# Patient Record
Sex: FEMALE | Race: WHITE | NOT HISPANIC OR LATINO | Employment: OTHER | URBAN - METROPOLITAN AREA
[De-identification: names, ages, dates, MRNs, and addresses within clinical notes are randomized per-mention and may not be internally consistent; named-entity substitution may affect disease eponyms.]

---

## 2017-05-24 ENCOUNTER — RECORDS - HEALTHEAST (OUTPATIENT)
Dept: LAB | Facility: CLINIC | Age: 77
End: 2017-05-24

## 2017-05-24 LAB
CHOLEST SERPL-MCNC: 159 MG/DL
FASTING STATUS PATIENT QL REPORTED: NO
HDLC SERPL-MCNC: 40 MG/DL
LDLC SERPL CALC-MCNC: 68 MG/DL
TRIGL SERPL-MCNC: 255 MG/DL

## 2018-07-25 ENCOUNTER — RECORDS - HEALTHEAST (OUTPATIENT)
Dept: LAB | Facility: CLINIC | Age: 78
End: 2018-07-25

## 2018-07-25 LAB
ANION GAP SERPL CALCULATED.3IONS-SCNC: 11 MMOL/L (ref 5–18)
BUN SERPL-MCNC: 21 MG/DL (ref 8–28)
CALCIUM SERPL-MCNC: 9.4 MG/DL (ref 8.5–10.5)
CHLORIDE BLD-SCNC: 106 MMOL/L (ref 98–107)
CHOLEST SERPL-MCNC: 144 MG/DL
CO2 SERPL-SCNC: 26 MMOL/L (ref 22–31)
CREAT SERPL-MCNC: 0.93 MG/DL (ref 0.6–1.1)
CREAT UR-MCNC: 244.2 MG/DL
FASTING STATUS PATIENT QL REPORTED: YES
GFR SERPL CREATININE-BSD FRML MDRD: 59 ML/MIN/1.73M2
GLUCOSE BLD-MCNC: 137 MG/DL (ref 70–125)
HDLC SERPL-MCNC: 36 MG/DL
LDLC SERPL CALC-MCNC: 76 MG/DL
MICROALBUMIN UR-MCNC: 7.36 MG/DL (ref 0–1.99)
MICROALBUMIN/CREAT UR: 30.1 MG/G
POTASSIUM BLD-SCNC: 4.3 MMOL/L (ref 3.5–5)
SODIUM SERPL-SCNC: 143 MMOL/L (ref 136–145)
TRIGL SERPL-MCNC: 159 MG/DL

## 2018-12-28 ENCOUNTER — RECORDS - HEALTHEAST (OUTPATIENT)
Dept: ADMINISTRATIVE | Facility: OTHER | Age: 78
End: 2018-12-28

## 2019-01-29 ENCOUNTER — HOSPITAL ENCOUNTER (OUTPATIENT)
Dept: NUCLEAR MEDICINE | Facility: HOSPITAL | Age: 79
Discharge: HOME | End: 2019-01-29

## 2019-01-29 ENCOUNTER — HOSPITAL ENCOUNTER (OUTPATIENT)
Dept: CARDIOLOGY | Facility: HOSPITAL | Age: 79
Discharge: HOME | End: 2019-01-29

## 2019-01-29 DIAGNOSIS — R06.02 SOB (SHORTNESS OF BREATH): ICD-10-CM

## 2019-01-29 LAB
CV STRESS CURRENT BP HE: NORMAL
CV STRESS CURRENT HR HE: 62
CV STRESS CURRENT HR HE: 63
CV STRESS CURRENT HR HE: 64
CV STRESS CURRENT HR HE: 66
CV STRESS CURRENT HR HE: 67
CV STRESS CURRENT HR HE: 67
CV STRESS CURRENT HR HE: 68
CV STRESS CURRENT HR HE: 70
CV STRESS CURRENT HR HE: 70
CV STRESS CURRENT HR HE: 74
CV STRESS CURRENT HR HE: 74
CV STRESS CURRENT HR HE: 76
CV STRESS CURRENT HR HE: 77
CV STRESS CURRENT HR HE: 80
CV STRESS CURRENT HR HE: 84
CV STRESS CURRENT HR HE: 87
CV STRESS DEVIATION TIME HE: NORMAL
CV STRESS ECHO PERCENT HR HE: NORMAL
CV STRESS EXERCISE STAGE HE: NORMAL
CV STRESS FINAL RESTING BP HE: NORMAL
CV STRESS FINAL RESTING HR HE: 67
CV STRESS MAX HR HE: 87
CV STRESS MAX TREADMILL GRADE HE: 0
CV STRESS MAX TREADMILL SPEED HE: 0
CV STRESS PEAK DIA BP HE: NORMAL
CV STRESS PEAK SYS BP HE: NORMAL
CV STRESS PHASE HE: NORMAL
CV STRESS PROTOCOL HE: NORMAL
CV STRESS RESTING PT POSITION HE: NORMAL
CV STRESS ST DEVIATION AMOUNT HE: NORMAL
CV STRESS ST DEVIATION ELEVATION HE: NORMAL
CV STRESS ST EVELATION AMOUNT HE: NORMAL
CV STRESS TEST TYPE HE: NORMAL
CV STRESS TOTAL STAGE TIME MIN 1 HE: NORMAL
NUC STRESS EJECTION FRACTION: 69 %
STRESS ECHO BASELINE BP: NORMAL
STRESS ECHO BASELINE HR: 62
STRESS ECHO CALCULATED PERCENT HR: 60 %
STRESS ECHO LAST STRESS BP: NORMAL
STRESS ECHO LAST STRESS HR: 74

## 2019-01-29 RX ORDER — AMLODIPINE BESYLATE 5 MG/1
5 TABLET ORAL DAILY
Status: SHIPPED | COMMUNITY
Start: 2019-01-29 | End: 2022-06-14 | Stop reason: DRUGHIGH

## 2019-01-29 RX ORDER — ATORVASTATIN CALCIUM 20 MG/1
20 TABLET, FILM COATED ORAL AT BEDTIME
Status: SHIPPED | COMMUNITY
Start: 2019-01-29

## 2019-01-29 ASSESSMENT — MIFFLIN-ST. JEOR: SCORE: 1320.03

## 2019-02-19 ENCOUNTER — RECORDS - HEALTHEAST (OUTPATIENT)
Dept: LAB | Facility: CLINIC | Age: 79
End: 2019-02-19

## 2019-02-20 LAB
ANION GAP SERPL CALCULATED.3IONS-SCNC: 9 MMOL/L (ref 5–18)
BUN SERPL-MCNC: 26 MG/DL (ref 8–28)
CALCIUM SERPL-MCNC: 9.8 MG/DL (ref 8.5–10.5)
CHLORIDE BLD-SCNC: 106 MMOL/L (ref 98–107)
CO2 SERPL-SCNC: 29 MMOL/L (ref 22–31)
CREAT SERPL-MCNC: 1.21 MG/DL (ref 0.6–1.1)
GFR SERPL CREATININE-BSD FRML MDRD: 43 ML/MIN/1.73M2
GLUCOSE BLD-MCNC: 93 MG/DL (ref 70–125)
POTASSIUM BLD-SCNC: 4.2 MMOL/L (ref 3.5–5)
SODIUM SERPL-SCNC: 144 MMOL/L (ref 136–145)

## 2019-04-19 ENCOUNTER — COMMUNICATION - HEALTHEAST (OUTPATIENT)
Dept: PULMONOLOGY | Facility: OTHER | Age: 79
End: 2019-04-19

## 2019-04-19 ENCOUNTER — AMBULATORY - HEALTHEAST (OUTPATIENT)
Dept: PULMONOLOGY | Facility: OTHER | Age: 79
End: 2019-04-19

## 2019-04-19 DIAGNOSIS — J44.9 COPD (CHRONIC OBSTRUCTIVE PULMONARY DISEASE) (H): ICD-10-CM

## 2019-05-25 ENCOUNTER — RECORDS - HEALTHEAST (OUTPATIENT)
Dept: LAB | Facility: CLINIC | Age: 79
End: 2019-05-25

## 2019-05-25 ENCOUNTER — RECORDS - HEALTHEAST (OUTPATIENT)
Dept: ADMINISTRATIVE | Facility: OTHER | Age: 79
End: 2019-05-25

## 2019-05-25 LAB
ANION GAP SERPL CALCULATED.3IONS-SCNC: 13 MMOL/L (ref 5–18)
BUN SERPL-MCNC: 22 MG/DL (ref 8–28)
CALCIUM SERPL-MCNC: 9.8 MG/DL (ref 8.5–10.5)
CHLORIDE BLD-SCNC: 105 MMOL/L (ref 98–107)
CO2 SERPL-SCNC: 24 MMOL/L (ref 22–31)
CREAT SERPL-MCNC: 1 MG/DL (ref 0.6–1.1)
GFR SERPL CREATININE-BSD FRML MDRD: 54 ML/MIN/1.73M2
GLUCOSE BLD-MCNC: 168 MG/DL (ref 70–125)
POTASSIUM BLD-SCNC: 4.1 MMOL/L (ref 3.5–5)
SODIUM SERPL-SCNC: 142 MMOL/L (ref 136–145)

## 2019-06-03 ENCOUNTER — RECORDS - HEALTHEAST (OUTPATIENT)
Dept: ADMINISTRATIVE | Facility: OTHER | Age: 79
End: 2019-06-03

## 2019-06-12 ENCOUNTER — OFFICE VISIT - HEALTHEAST (OUTPATIENT)
Dept: PULMONOLOGY | Facility: OTHER | Age: 79
End: 2019-06-12

## 2019-06-12 ENCOUNTER — RECORDS - HEALTHEAST (OUTPATIENT)
Dept: ADMINISTRATIVE | Facility: OTHER | Age: 79
End: 2019-06-12

## 2019-06-12 ENCOUNTER — RECORDS - HEALTHEAST (OUTPATIENT)
Dept: PULMONOLOGY | Facility: OTHER | Age: 79
End: 2019-06-12

## 2019-06-12 DIAGNOSIS — J45.40 MODERATE PERSISTENT ASTHMA WITHOUT COMPLICATION: ICD-10-CM

## 2019-06-12 DIAGNOSIS — I50.33 ACUTE ON CHRONIC DIASTOLIC CONGESTIVE HEART FAILURE (H): ICD-10-CM

## 2019-06-12 DIAGNOSIS — J44.9 CHRONIC OBSTRUCTIVE PULMONARY DISEASE, UNSPECIFIED (H): ICD-10-CM

## 2019-06-12 LAB — HGB BLD-MCNC: 12.5 G/DL

## 2019-06-12 ASSESSMENT — MIFFLIN-ST. JEOR: SCORE: 1441.6

## 2019-07-01 ENCOUNTER — RECORDS - HEALTHEAST (OUTPATIENT)
Dept: ADMINISTRATIVE | Facility: OTHER | Age: 79
End: 2019-07-01

## 2019-07-01 ENCOUNTER — AMBULATORY - HEALTHEAST (OUTPATIENT)
Dept: CARDIOLOGY | Facility: CLINIC | Age: 79
End: 2019-07-01

## 2019-07-05 ENCOUNTER — OFFICE VISIT - HEALTHEAST (OUTPATIENT)
Dept: CARDIOLOGY | Facility: CLINIC | Age: 79
End: 2019-07-05

## 2019-07-05 DIAGNOSIS — I50.32 CHRONIC DIASTOLIC CONGESTIVE HEART FAILURE (H): ICD-10-CM

## 2019-07-05 DIAGNOSIS — E66.9 OBESITY: ICD-10-CM

## 2019-07-05 DIAGNOSIS — R07.89 CHEST DISCOMFORT: ICD-10-CM

## 2019-07-05 LAB — BNP SERPL-MCNC: 40 PG/ML (ref 0–140)

## 2019-07-05 ASSESSMENT — MIFFLIN-ST. JEOR: SCORE: 1444.7

## 2019-07-10 LAB
ATRIAL RATE - MUSE: 66 BPM
DIASTOLIC BLOOD PRESSURE - MUSE: NORMAL MMHG
INTERPRETATION ECG - MUSE: NORMAL
P AXIS - MUSE: 61 DEGREES
PR INTERVAL - MUSE: 176 MS
QRS DURATION - MUSE: 90 MS
QT - MUSE: 392 MS
QTC - MUSE: 410 MS
R AXIS - MUSE: -33 DEGREES
SYSTOLIC BLOOD PRESSURE - MUSE: NORMAL MMHG
T AXIS - MUSE: 43 DEGREES
VENTRICULAR RATE- MUSE: 66 BPM

## 2019-07-12 ENCOUNTER — HOSPITAL ENCOUNTER (OUTPATIENT)
Dept: CARDIOLOGY | Facility: HOSPITAL | Age: 79
Discharge: HOME | End: 2019-07-12
Attending: INTERNAL MEDICINE

## 2019-07-12 DIAGNOSIS — I50.32 CHRONIC DIASTOLIC CONGESTIVE HEART FAILURE (H): ICD-10-CM

## 2019-07-12 ASSESSMENT — MIFFLIN-ST. JEOR: SCORE: 1443.64

## 2019-07-15 LAB
AORTIC ROOT: 2.9 CM
AORTIC VALVE MEAN VELOCITY: 190 CM/S
AV DIMENSIONLESS INDEX VTI: 0.6
AV MEAN GRADIENT: 15 MMHG
AV PEAK GRADIENT: 24.2 MMHG
AV VALVE AREA: 2.1 CM2
AV VELOCITY RATIO: 0.6
BSA FOR ECHO PROCEDURE: 2.1 M2
CV BLOOD PRESSURE: ABNORMAL MMHG
CV ECHO HEIGHT: 64.5 IN
CV ECHO WEIGHT: 214 LBS
DOP CALC AO PEAK VEL: 246 CM/S
DOP CALC AO VTI: 51.9 CM
DOP CALC LVOT AREA: 3.8 CM2
DOP CALC LVOT DIAMETER: 2.2 CM
DOP CALC LVOT PEAK VEL: 157 CM/S
DOP CALC LVOT STROKE VOLUME: 108.7 CM3
DOP CALCLVOT PEAK VEL VTI: 28.6 CM
EJECTION FRACTION: 72 % (ref 55–75)
FRACTIONAL SHORTENING: 28.9 % (ref 28–44)
INTERVENTRICULAR SEPTUM IN END DIASTOLE: 1.31 CM (ref 0.6–0.9)
IVS/PW RATIO: 0.9
LA AREA 1: 19 CM2
LA AREA 2: 25 CM2
LEFT ATRIUM LENGTH: 5.1 CM
LEFT ATRIUM SIZE: 4.8 CM
LEFT ATRIUM VOLUME INDEX: 37.7 ML/M2
LEFT ATRIUM VOLUME: 79.2 ML
LEFT VENTRICLE CARDIAC INDEX: 3.6 L/MIN/M2
LEFT VENTRICLE CARDIAC OUTPUT: 7.6 L/MIN
LEFT VENTRICLE DIASTOLIC VOLUME INDEX: 37.6 CM3/M2 (ref 29–61)
LEFT VENTRICLE DIASTOLIC VOLUME: 78.9 CM3 (ref 46–106)
LEFT VENTRICLE HEART RATE: 70 BPM
LEFT VENTRICLE MASS INDEX: 118.1 G/M2
LEFT VENTRICLE SYSTOLIC VOLUME INDEX: 10.4 CM3/M2 (ref 8–24)
LEFT VENTRICLE SYSTOLIC VOLUME: 21.9 CM3 (ref 14–42)
LEFT VENTRICULAR INTERNAL DIMENSION IN DIASTOLE: 4.56 CM (ref 3.8–5.2)
LEFT VENTRICULAR INTERNAL DIMENSION IN SYSTOLE: 3.24 CM (ref 2.2–3.5)
LEFT VENTRICULAR MASS: 248 G
LEFT VENTRICULAR OUTFLOW TRACT MEAN GRADIENT: 6 MMHG
LEFT VENTRICULAR OUTFLOW TRACT MEAN VELOCITY: 122 CM/S
LEFT VENTRICULAR OUTFLOW TRACT PEAK GRADIENT: 10 MMHG
LEFT VENTRICULAR POSTERIOR WALL IN END DIASTOLE: 1.45 CM (ref 0.6–0.9)
LV STROKE VOLUME INDEX: 51.7 ML/M2
MITRAL VALVE E/A RATIO: 0.8
MV AVERAGE E/E' RATIO: 20.7 CM/S
MV DECELERATION TIME: 211 MS
MV E'TISSUE VEL-LAT: 5 CM/S
MV E'TISSUE VEL-MED: 4.68 CM/S
MV LATERAL E/E' RATIO: 20
MV MEDIAL E/E' RATIO: 21.4
MV PEAK A VELOCITY: 131 CM/S
MV PEAK E VELOCITY: 100 CM/S
NUC REST DIASTOLIC VOLUME INDEX: 3424 LBS
NUC REST SYSTOLIC VOLUME INDEX: 64.5 IN
TRICUSPID VALVE ANULAR PLANE SYSTOLIC EXCURSION: 2.7 CM

## 2019-08-01 ENCOUNTER — HOSPITAL ENCOUNTER (OUTPATIENT)
Dept: CT IMAGING | Facility: CLINIC | Age: 79
Discharge: HOME | End: 2019-08-01
Attending: INTERNAL MEDICINE

## 2019-08-01 ENCOUNTER — COMMUNICATION - HEALTHEAST (OUTPATIENT)
Dept: TELEHEALTH | Facility: CLINIC | Age: 79
End: 2019-08-01

## 2019-08-01 DIAGNOSIS — R07.89 CHEST DISCOMFORT: ICD-10-CM

## 2019-08-01 LAB
BSA FOR ECHO PROCEDURE: 0 M2
CREAT BLD-MCNC: 1.1 MG/DL (ref 0.6–1.1)
CV CALCIUM SCORE AGATSTON LM: 0
CV CALCIUM SCORING AGATSON LAD: 0
CV CALCIUM SCORING AGATSTON CX: 0
CV CALCIUM SCORING AGATSTON RCA: 0
CV CALCIUM SCORING AGATSTON TOTAL: 0
GFR SERPL CREATININE-BSD FRML MDRD: 48 ML/MIN/1.73M2
LEFT VENTRICLE HEART RATE: 64 BPM

## 2019-08-22 ENCOUNTER — OFFICE VISIT - HEALTHEAST (OUTPATIENT)
Dept: CARDIOLOGY | Facility: CLINIC | Age: 79
End: 2019-08-22

## 2019-08-22 ENCOUNTER — COMMUNICATION - HEALTHEAST (OUTPATIENT)
Dept: CARDIOLOGY | Facility: CLINIC | Age: 79
End: 2019-08-22

## 2019-08-22 DIAGNOSIS — I50.32 CHRONIC DIASTOLIC CONGESTIVE HEART FAILURE (H): ICD-10-CM

## 2019-08-22 LAB
ANION GAP SERPL CALCULATED.3IONS-SCNC: 11 MMOL/L (ref 5–18)
BUN SERPL-MCNC: 27 MG/DL (ref 8–28)
CALCIUM SERPL-MCNC: 9.6 MG/DL (ref 8.5–10.5)
CHLORIDE BLD-SCNC: 106 MMOL/L (ref 98–107)
CO2 SERPL-SCNC: 28 MMOL/L (ref 22–31)
CREAT SERPL-MCNC: 1.15 MG/DL (ref 0.6–1.1)
GFR SERPL CREATININE-BSD FRML MDRD: 46 ML/MIN/1.73M2
GLUCOSE BLD-MCNC: 129 MG/DL (ref 70–125)
MAGNESIUM SERPL-MCNC: 2.2 MG/DL (ref 1.8–2.6)
POTASSIUM BLD-SCNC: 4.3 MMOL/L (ref 3.5–5)
SODIUM SERPL-SCNC: 145 MMOL/L (ref 136–145)

## 2019-08-22 RX ORDER — LOSARTAN POTASSIUM AND HYDROCHLOROTHIAZIDE 12.5; 1 MG/1; MG/1
1 TABLET ORAL DAILY
Refills: 0 | Status: SHIPPED | COMMUNITY
Start: 2019-08-01 | End: 2022-01-11

## 2019-08-22 RX ORDER — ZOLPIDEM TARTRATE 10 MG/1
1 TABLET ORAL PRN
Refills: 1 | Status: SHIPPED | COMMUNITY
Start: 2019-07-18 | End: 2022-01-11

## 2019-08-22 RX ORDER — ACETAMINOPHEN AND CODEINE PHOSPHATE 300; 30 MG/1; MG/1
1 TABLET ORAL
Refills: 0 | Status: SHIPPED | COMMUNITY
Start: 2019-07-22 | End: 2022-01-11

## 2019-08-22 ASSESSMENT — MIFFLIN-ST. JEOR: SCORE: 1443.64

## 2019-08-29 ENCOUNTER — COMMUNICATION - HEALTHEAST (OUTPATIENT)
Dept: TELEHEALTH | Facility: CLINIC | Age: 79
End: 2019-08-29

## 2019-08-29 ENCOUNTER — OFFICE VISIT - HEALTHEAST (OUTPATIENT)
Dept: PULMONOLOGY | Facility: OTHER | Age: 79
End: 2019-08-29

## 2019-08-29 DIAGNOSIS — J45.40 MODERATE PERSISTENT ASTHMA WITHOUT COMPLICATION: ICD-10-CM

## 2019-10-14 ENCOUNTER — RECORDS - HEALTHEAST (OUTPATIENT)
Dept: LAB | Facility: CLINIC | Age: 79
End: 2019-10-14

## 2019-10-15 LAB
ANION GAP SERPL CALCULATED.3IONS-SCNC: 10 MMOL/L (ref 5–18)
BUN SERPL-MCNC: 17 MG/DL (ref 8–28)
CALCIUM SERPL-MCNC: 9.7 MG/DL (ref 8.5–10.5)
CHLORIDE BLD-SCNC: 105 MMOL/L (ref 98–107)
CHOLEST SERPL-MCNC: 147 MG/DL
CO2 SERPL-SCNC: 26 MMOL/L (ref 22–31)
CREAT SERPL-MCNC: 1.14 MG/DL (ref 0.6–1.1)
CREAT UR-MCNC: 170.5 MG/DL
FASTING STATUS PATIENT QL REPORTED: ABNORMAL
GFR SERPL CREATININE-BSD FRML MDRD: 46 ML/MIN/1.73M2
GLUCOSE BLD-MCNC: 117 MG/DL (ref 70–125)
HDLC SERPL-MCNC: 47 MG/DL
LDLC SERPL CALC-MCNC: 74 MG/DL
MICROALBUMIN UR-MCNC: 4.23 MG/DL (ref 0–1.99)
MICROALBUMIN/CREAT UR: 24.8 MG/G
POTASSIUM BLD-SCNC: 3.7 MMOL/L (ref 3.5–5)
SODIUM SERPL-SCNC: 141 MMOL/L (ref 136–145)
TRIGL SERPL-MCNC: 129 MG/DL

## 2019-11-18 ENCOUNTER — RECORDS - HEALTHEAST (OUTPATIENT)
Dept: LAB | Facility: CLINIC | Age: 79
End: 2019-11-18

## 2019-11-18 LAB — URATE SERPL-MCNC: 8.2 MG/DL (ref 2–7.5)

## 2020-01-23 ENCOUNTER — AMBULATORY - HEALTHEAST (OUTPATIENT)
Dept: PULMONOLOGY | Facility: OTHER | Age: 80
End: 2020-01-23

## 2020-01-23 DIAGNOSIS — J45.40 MODERATE PERSISTENT ASTHMA WITHOUT COMPLICATION: ICD-10-CM

## 2020-01-26 ENCOUNTER — RECORDS - HEALTHEAST (OUTPATIENT)
Dept: LAB | Facility: CLINIC | Age: 80
End: 2020-01-26

## 2020-01-27 LAB — BACTERIA SPEC CULT: NO GROWTH

## 2020-02-13 ENCOUNTER — COMMUNICATION - HEALTHEAST (OUTPATIENT)
Dept: PULMONOLOGY | Facility: OTHER | Age: 80
End: 2020-02-13

## 2020-02-24 ENCOUNTER — RECORDS - HEALTHEAST (OUTPATIENT)
Dept: LAB | Facility: CLINIC | Age: 80
End: 2020-02-24

## 2020-02-24 LAB — URATE SERPL-MCNC: 6.4 MG/DL (ref 2–7.5)

## 2020-04-07 ENCOUNTER — AMBULATORY - HEALTHEAST (OUTPATIENT)
Dept: CARDIOLOGY | Facility: CLINIC | Age: 80
End: 2020-04-07

## 2020-04-07 ENCOUNTER — RECORDS - HEALTHEAST (OUTPATIENT)
Dept: ADMINISTRATIVE | Facility: OTHER | Age: 80
End: 2020-04-07

## 2020-04-08 ENCOUNTER — OFFICE VISIT - HEALTHEAST (OUTPATIENT)
Dept: CARDIOLOGY | Facility: CLINIC | Age: 80
End: 2020-04-08

## 2020-04-08 DIAGNOSIS — J44.89 CHRONIC OBSTRUCTIVE AIRWAY DISEASE WITH ASTHMA (H): ICD-10-CM

## 2020-04-08 DIAGNOSIS — I10 BENIGN ESSENTIAL HYPERTENSION: ICD-10-CM

## 2020-05-18 ENCOUNTER — RECORDS - HEALTHEAST (OUTPATIENT)
Dept: LAB | Facility: CLINIC | Age: 80
End: 2020-05-18

## 2020-05-18 LAB
ALBUMIN SERPL-MCNC: 4.1 G/DL (ref 3.5–5)
ALP SERPL-CCNC: 80 U/L (ref 45–120)
ALT SERPL W P-5'-P-CCNC: 43 U/L (ref 0–45)
ANION GAP SERPL CALCULATED.3IONS-SCNC: 12 MMOL/L (ref 5–18)
AST SERPL W P-5'-P-CCNC: 26 U/L (ref 0–40)
BILIRUB SERPL-MCNC: 0.4 MG/DL (ref 0–1)
BUN SERPL-MCNC: 22 MG/DL (ref 8–28)
CALCIUM SERPL-MCNC: 9.7 MG/DL (ref 8.5–10.5)
CHLORIDE BLD-SCNC: 104 MMOL/L (ref 98–107)
CO2 SERPL-SCNC: 27 MMOL/L (ref 22–31)
CREAT SERPL-MCNC: 1.1 MG/DL (ref 0.6–1.1)
GFR SERPL CREATININE-BSD FRML MDRD: 48 ML/MIN/1.73M2
GLUCOSE BLD-MCNC: 127 MG/DL (ref 70–125)
POTASSIUM BLD-SCNC: 4.8 MMOL/L (ref 3.5–5)
PROT SERPL-MCNC: 6.9 G/DL (ref 6–8)
SODIUM SERPL-SCNC: 143 MMOL/L (ref 136–145)
URATE SERPL-MCNC: 7.5 MG/DL (ref 2–7.5)

## 2020-07-07 ENCOUNTER — OFFICE VISIT - HEALTHEAST (OUTPATIENT)
Dept: PULMONOLOGY | Facility: OTHER | Age: 80
End: 2020-07-07

## 2020-07-07 DIAGNOSIS — J45.40 MODERATE PERSISTENT ASTHMA WITHOUT COMPLICATION: ICD-10-CM

## 2020-07-07 ASSESSMENT — MIFFLIN-ST. JEOR: SCORE: 1398.28

## 2020-07-13 ENCOUNTER — COMMUNICATION - HEALTHEAST (OUTPATIENT)
Dept: PULMONOLOGY | Facility: OTHER | Age: 80
End: 2020-07-13

## 2020-07-13 DIAGNOSIS — J45.40 MODERATE PERSISTENT ASTHMA: ICD-10-CM

## 2020-07-21 ENCOUNTER — RECORDS - HEALTHEAST (OUTPATIENT)
Dept: LAB | Facility: CLINIC | Age: 80
End: 2020-07-21

## 2020-07-21 LAB
FASTING STATUS PATIENT QL REPORTED: NO
GLUCOSE BLD-MCNC: 233 MG/DL (ref 70–125)
URATE SERPL-MCNC: 5.6 MG/DL (ref 2–7.5)

## 2020-07-30 ENCOUNTER — COMMUNICATION - HEALTHEAST (OUTPATIENT)
Dept: SCHEDULING | Facility: CLINIC | Age: 80
End: 2020-07-30

## 2020-07-30 DIAGNOSIS — Z20.822 SUSPECTED COVID-19 VIRUS INFECTION: ICD-10-CM

## 2020-08-14 ENCOUNTER — OFFICE VISIT - HEALTHEAST (OUTPATIENT)
Dept: PULMONOLOGY | Facility: OTHER | Age: 80
End: 2020-08-14

## 2020-08-14 DIAGNOSIS — J45.40 MODERATE PERSISTENT ASTHMA WITHOUT COMPLICATION: ICD-10-CM

## 2020-08-14 DIAGNOSIS — J30.9 ALLERGIC RHINITIS, UNSPECIFIED SEASONALITY, UNSPECIFIED TRIGGER: ICD-10-CM

## 2020-10-15 ENCOUNTER — OFFICE VISIT - HEALTHEAST (OUTPATIENT)
Dept: PULMONOLOGY | Facility: OTHER | Age: 80
End: 2020-10-15

## 2020-10-15 DIAGNOSIS — J45.40 MODERATE PERSISTENT ASTHMA WITHOUT COMPLICATION: ICD-10-CM

## 2020-10-15 ASSESSMENT — MIFFLIN-ST. JEOR: SCORE: 1433.43

## 2020-10-16 ENCOUNTER — COMMUNICATION - HEALTHEAST (OUTPATIENT)
Dept: PULMONOLOGY | Facility: OTHER | Age: 80
End: 2020-10-16

## 2020-12-04 ENCOUNTER — RECORDS - HEALTHEAST (OUTPATIENT)
Dept: LAB | Facility: CLINIC | Age: 80
End: 2020-12-04

## 2020-12-04 LAB
ANION GAP SERPL CALCULATED.3IONS-SCNC: 12 MMOL/L (ref 5–18)
BUN SERPL-MCNC: 29 MG/DL (ref 8–28)
CALCIUM SERPL-MCNC: 9.3 MG/DL (ref 8.5–10.5)
CHLORIDE BLD-SCNC: 102 MMOL/L (ref 98–107)
CO2 SERPL-SCNC: 26 MMOL/L (ref 22–31)
CREAT SERPL-MCNC: 1.17 MG/DL (ref 0.6–1.1)
GFR SERPL CREATININE-BSD FRML MDRD: 45 ML/MIN/1.73M2
GLUCOSE BLD-MCNC: 214 MG/DL (ref 70–125)
POTASSIUM BLD-SCNC: 4.1 MMOL/L (ref 3.5–5)
SODIUM SERPL-SCNC: 140 MMOL/L (ref 136–145)

## 2020-12-07 LAB — COVID-19 ANTIBODY IGG: NEGATIVE

## 2020-12-17 ENCOUNTER — RECORDS - HEALTHEAST (OUTPATIENT)
Dept: ADMINISTRATIVE | Facility: OTHER | Age: 80
End: 2020-12-17

## 2020-12-17 ENCOUNTER — AMBULATORY - HEALTHEAST (OUTPATIENT)
Dept: CARDIOLOGY | Facility: CLINIC | Age: 80
End: 2020-12-17

## 2020-12-21 ENCOUNTER — COMMUNICATION - HEALTHEAST (OUTPATIENT)
Dept: CARDIOLOGY | Facility: CLINIC | Age: 80
End: 2020-12-21

## 2021-01-05 ENCOUNTER — OFFICE VISIT - HEALTHEAST (OUTPATIENT)
Dept: PULMONOLOGY | Facility: OTHER | Age: 81
End: 2021-01-05

## 2021-01-05 DIAGNOSIS — J45.40 MODERATE PERSISTENT ASTHMA WITHOUT COMPLICATION: ICD-10-CM

## 2021-01-05 RX ORDER — ALBUTEROL SULFATE 90 UG/1
2 AEROSOL, METERED RESPIRATORY (INHALATION) EVERY 4 HOURS PRN
Qty: 1 INHALER | Refills: 1 | Status: SHIPPED | OUTPATIENT
Start: 2021-01-05 | End: 2021-08-12

## 2021-01-26 ENCOUNTER — RECORDS - HEALTHEAST (OUTPATIENT)
Dept: LAB | Facility: CLINIC | Age: 81
End: 2021-01-26

## 2021-01-27 LAB — BACTERIA SPEC CULT: NO GROWTH

## 2021-03-03 ENCOUNTER — RECORDS - HEALTHEAST (OUTPATIENT)
Dept: LAB | Facility: CLINIC | Age: 81
End: 2021-03-03

## 2021-03-03 LAB
CHOLEST SERPL-MCNC: 140 MG/DL
FASTING STATUS PATIENT QL REPORTED: ABNORMAL
HDLC SERPL-MCNC: 47 MG/DL
LDLC SERPL CALC-MCNC: 51 MG/DL
TRIGL SERPL-MCNC: 208 MG/DL

## 2021-03-23 ENCOUNTER — RECORDS - HEALTHEAST (OUTPATIENT)
Dept: ADMINISTRATIVE | Facility: OTHER | Age: 81
End: 2021-03-23

## 2021-03-23 ENCOUNTER — AMBULATORY - HEALTHEAST (OUTPATIENT)
Dept: CARDIOLOGY | Facility: CLINIC | Age: 81
End: 2021-03-23

## 2021-03-24 ENCOUNTER — OFFICE VISIT - HEALTHEAST (OUTPATIENT)
Dept: CARDIOLOGY | Facility: CLINIC | Age: 81
End: 2021-03-24

## 2021-03-24 DIAGNOSIS — I35.0 NONRHEUMATIC AORTIC VALVE STENOSIS: ICD-10-CM

## 2021-03-24 DIAGNOSIS — E66.01 MORBID OBESITY (H): ICD-10-CM

## 2021-03-24 DIAGNOSIS — J45.990 EXERCISE-INDUCED ASTHMA: ICD-10-CM

## 2021-03-24 DIAGNOSIS — R06.00 DYSPNEA: ICD-10-CM

## 2021-03-24 DIAGNOSIS — R06.09 DYSPNEA ON EXERTION: ICD-10-CM

## 2021-03-24 DIAGNOSIS — I10 BENIGN ESSENTIAL HYPERTENSION: ICD-10-CM

## 2021-03-24 DIAGNOSIS — R60.0 BILATERAL LEG EDEMA: ICD-10-CM

## 2021-03-24 LAB
ANION GAP SERPL CALCULATED.3IONS-SCNC: 11 MMOL/L (ref 5–18)
BUN SERPL-MCNC: 21 MG/DL (ref 8–28)
CALCIUM SERPL-MCNC: 9 MG/DL (ref 8.5–10.5)
CHLORIDE BLD-SCNC: 108 MMOL/L (ref 98–107)
CO2 SERPL-SCNC: 23 MMOL/L (ref 22–31)
CREAT SERPL-MCNC: 1.17 MG/DL (ref 0.6–1.1)
GFR SERPL CREATININE-BSD FRML MDRD: 45 ML/MIN/1.73M2
GLUCOSE BLD-MCNC: 272 MG/DL (ref 70–125)
MAGNESIUM SERPL-MCNC: 2 MG/DL (ref 1.8–2.6)
NT-PROBNP SERPL-MCNC: 113 PG/ML (ref 0–450)
POTASSIUM BLD-SCNC: 3.9 MMOL/L (ref 3.5–5)
SODIUM SERPL-SCNC: 142 MMOL/L (ref 136–145)

## 2021-03-24 RX ORDER — AZELASTINE 1 MG/ML
1 SPRAY, METERED NASAL DAILY
Status: SHIPPED | COMMUNITY
Start: 2021-03-24 | End: 2021-08-12

## 2021-03-24 RX ORDER — ALLOPURINOL 100 MG/1
100 TABLET ORAL DAILY
Status: SHIPPED | COMMUNITY
Start: 2021-03-21

## 2021-03-24 RX ORDER — FAMOTIDINE 20 MG/1
20 TABLET, FILM COATED ORAL DAILY PRN
Status: SHIPPED | COMMUNITY
Start: 2021-03-21 | End: 2023-11-07

## 2021-03-24 RX ORDER — FUROSEMIDE 20 MG
10 TABLET ORAL DAILY
Qty: 30 TABLET | Refills: 3 | Status: SHIPPED | OUTPATIENT
Start: 2021-03-24 | End: 2022-01-11

## 2021-03-24 ASSESSMENT — MIFFLIN-ST. JEOR: SCORE: 1472.9

## 2021-03-25 ENCOUNTER — COMMUNICATION - HEALTHEAST (OUTPATIENT)
Dept: CARDIOLOGY | Facility: CLINIC | Age: 81
End: 2021-03-25

## 2021-03-31 ENCOUNTER — COMMUNICATION - HEALTHEAST (OUTPATIENT)
Dept: SCHEDULING | Facility: CLINIC | Age: 81
End: 2021-03-31

## 2021-04-14 ENCOUNTER — HOSPITAL ENCOUNTER (OUTPATIENT)
Dept: CARDIOLOGY | Facility: HOSPITAL | Age: 81
Discharge: HOME | End: 2021-04-14
Attending: INTERNAL MEDICINE

## 2021-04-14 DIAGNOSIS — R06.00 DYSPNEA: ICD-10-CM

## 2021-04-14 LAB
AORTIC ROOT: 3.2 CM
AORTIC VALVE MEAN VELOCITY: 145 CM/S
ASCENDING AORTA: 3.6 CM
AV DIMENSIONLESS INDEX VTI: 0.5
AV MEAN GRADIENT: 10 MMHG
AV PEAK GRADIENT: 20.4 MMHG
AV VALVE AREA: 1.8 CM2
AV VELOCITY RATIO: 0.6
BSA FOR ECHO PROCEDURE: 2.13 M2
CV BLOOD PRESSURE: ABNORMAL MMHG
CV ECHO HEIGHT: 65 IN
CV ECHO WEIGHT: 219 LBS
DOP CALC AO PEAK VEL: 226 CM/S
DOP CALC AO VTI: 51 CM
DOP CALC LVOT AREA: 3.46 CM2
DOP CALC LVOT DIAMETER: 2.1 CM
DOP CALC LVOT PEAK VEL: 126 CM/S
DOP CALC LVOT STROKE VOLUME: 91.4 CM3
DOP CALCLVOT PEAK VEL VTI: 26.4 CM
EJECTION FRACTION: 65 % (ref 55–75)
FRACTIONAL SHORTENING: 46.5 % (ref 28–44)
INTERVENTRICULAR SEPTUM IN END DIASTOLE: 1.3 CM (ref 0.6–0.9)
IVS/PW RATIO: 1.1
LA AREA 1: 25.9 CM2
LA AREA 2: 24.5 CM2
LEFT ATRIUM LENGTH: 5.79 CM
LEFT ATRIUM SIZE: 4.4 CM
LEFT ATRIUM TO AORTIC ROOT RATIO: 1.38 NO UNITS
LEFT ATRIUM VOLUME INDEX: 43.7 ML/M2
LEFT ATRIUM VOLUME: 93.2 ML
LEFT VENTRICLE CARDIAC INDEX: 2.7 L/MIN/M2
LEFT VENTRICLE CARDIAC OUTPUT: 5.8 L/MIN
LEFT VENTRICLE DIASTOLIC VOLUME INDEX: 33.8 CM3/M2 (ref 29–61)
LEFT VENTRICLE DIASTOLIC VOLUME: 72 CM3 (ref 46–106)
LEFT VENTRICLE HEART RATE: 63 BPM
LEFT VENTRICLE MASS INDEX: 92 G/M2
LEFT VENTRICLE SYSTOLIC VOLUME INDEX: 11.7 CM3/M2 (ref 8–24)
LEFT VENTRICLE SYSTOLIC VOLUME: 25 CM3 (ref 14–42)
LEFT VENTRICULAR INTERNAL DIMENSION IN DIASTOLE: 4.3 CM (ref 3.8–5.2)
LEFT VENTRICULAR INTERNAL DIMENSION IN SYSTOLE: 2.3 CM (ref 2.2–3.5)
LEFT VENTRICULAR MASS: 196.1 G
LEFT VENTRICULAR OUTFLOW TRACT MEAN GRADIENT: 3 MMHG
LEFT VENTRICULAR OUTFLOW TRACT MEAN VELOCITY: 74 CM/S
LEFT VENTRICULAR OUTFLOW TRACT PEAK GRADIENT: 6 MMHG
LEFT VENTRICULAR POSTERIOR WALL IN END DIASTOLE: 1.2 CM (ref 0.6–0.9)
LV STROKE VOLUME INDEX: 42.9 ML/M2
MITRAL VALVE E/A RATIO: 0.9
MV AVERAGE E/E' RATIO: 15.9 CM/S
MV DECELERATION TIME: 289 MS
MV E'TISSUE VEL-LAT: 6.04 CM/S
MV E'TISSUE VEL-MED: 5.95 CM/S
MV LATERAL E/E' RATIO: 15.8
MV MEDIAL E/E' RATIO: 16
MV PEAK A VELOCITY: 107 CM/S
MV PEAK E VELOCITY: 95.3 CM/S
NUC REST DIASTOLIC VOLUME INDEX: 3499.2 LBS
NUC REST SYSTOLIC VOLUME INDEX: 65 IN
TRICUSPID VALVE ANULAR PLANE SYSTOLIC EXCURSION: 2.7 CM

## 2021-04-14 ASSESSMENT — MIFFLIN-ST. JEOR: SCORE: 1472.9

## 2021-04-26 ENCOUNTER — AMBULATORY - HEALTHEAST (OUTPATIENT)
Dept: CARDIOLOGY | Facility: CLINIC | Age: 81
End: 2021-04-26

## 2021-04-26 ENCOUNTER — AMBULATORY - HEALTHEAST (OUTPATIENT)
Dept: LAB | Facility: HOSPITAL | Age: 81
End: 2021-04-26

## 2021-04-26 DIAGNOSIS — I10 BENIGN ESSENTIAL HYPERTENSION: ICD-10-CM

## 2021-04-26 DIAGNOSIS — I35.0 AORTIC STENOSIS: ICD-10-CM

## 2021-04-26 DIAGNOSIS — R06.09 DOE (DYSPNEA ON EXERTION): ICD-10-CM

## 2021-04-26 DIAGNOSIS — R60.0 BILATERAL LEG EDEMA: ICD-10-CM

## 2021-04-26 DIAGNOSIS — I50.32 CHRONIC DIASTOLIC CONGESTIVE HEART FAILURE (H): ICD-10-CM

## 2021-04-26 LAB
ANION GAP SERPL CALCULATED.3IONS-SCNC: 13 MMOL/L (ref 5–18)
BNP SERPL-MCNC: 48 PG/ML (ref 0–147)
BUN SERPL-MCNC: 26 MG/DL (ref 8–28)
CALCIUM SERPL-MCNC: 9 MG/DL (ref 8.5–10.5)
CHLORIDE BLD-SCNC: 105 MMOL/L (ref 98–107)
CO2 SERPL-SCNC: 25 MMOL/L (ref 22–31)
CREAT SERPL-MCNC: 1.17 MG/DL (ref 0.6–1.1)
GFR SERPL CREATININE-BSD FRML MDRD: 45 ML/MIN/1.73M2
GLUCOSE BLD-MCNC: 177 MG/DL (ref 70–125)
MAGNESIUM SERPL-MCNC: 2.1 MG/DL (ref 1.8–2.6)
POTASSIUM BLD-SCNC: 3.7 MMOL/L (ref 3.5–5)
SODIUM SERPL-SCNC: 143 MMOL/L (ref 136–145)

## 2021-04-30 ENCOUNTER — RECORDS - HEALTHEAST (OUTPATIENT)
Dept: ADMINISTRATIVE | Facility: OTHER | Age: 81
End: 2021-04-30

## 2021-04-30 ENCOUNTER — OFFICE VISIT - HEALTHEAST (OUTPATIENT)
Dept: CARDIOLOGY | Facility: CLINIC | Age: 81
End: 2021-04-30

## 2021-04-30 DIAGNOSIS — R06.00 DYSPNEA: ICD-10-CM

## 2021-04-30 DIAGNOSIS — I10 BENIGN ESSENTIAL HYPERTENSION: ICD-10-CM

## 2021-04-30 DIAGNOSIS — R06.09 DOE (DYSPNEA ON EXERTION): ICD-10-CM

## 2021-04-30 DIAGNOSIS — J45.990 EXERCISE-INDUCED ASTHMA: ICD-10-CM

## 2021-04-30 DIAGNOSIS — I35.0 NONRHEUMATIC AORTIC VALVE STENOSIS: ICD-10-CM

## 2021-04-30 DIAGNOSIS — R60.0 BILATERAL LEG EDEMA: ICD-10-CM

## 2021-04-30 RX ORDER — GABAPENTIN 300 MG/1
600 CAPSULE ORAL 2 TIMES DAILY
Status: SHIPPED | COMMUNITY
Start: 2021-04-20

## 2021-04-30 ASSESSMENT — MIFFLIN-ST. JEOR: SCORE: 1392.61

## 2021-05-03 ENCOUNTER — HOSPITAL ENCOUNTER (OUTPATIENT)
Dept: RADIOLOGY | Facility: HOSPITAL | Age: 81
Discharge: HOME | End: 2021-05-03
Attending: INTERNAL MEDICINE

## 2021-05-03 DIAGNOSIS — R06.00 DYSPNEA: ICD-10-CM

## 2021-05-28 ENCOUNTER — RECORDS - HEALTHEAST (OUTPATIENT)
Dept: ADMINISTRATIVE | Facility: CLINIC | Age: 81
End: 2021-05-28

## 2021-05-28 ASSESSMENT — ASTHMA QUESTIONNAIRES: ACT_TOTALSCORE: 9

## 2021-05-29 NOTE — PROGRESS NOTES
Pulmonary Clinic Outpatient Consultation  6/12/2019     Assessment and Plan:   78 year old former smoker with the most pertinent medical history of hypertension, GERD, obesity, and peripheral edema, presenting for evaluation of exertional dyspnea.  Her testing was able to rule out COPD, but she still has clinical symptoms concerning for asthma.  In addition she has clinical symptoms and PFT findings suggestive of diastolic heart failure with pulmonary edema.    #.  Asthma, mild to moderate persistent (likely), no acute exacerbation at this time.  #.  Decompensated diastolic heart failure.      Start Breo ellipta inhaler (I would like to eliminate confounding respiratory symptoms due to asthma so patient can receive adequate treatment for her diastolic heart failure)    Continue PRN albuterol    Continue with Cardiology work-up; has an appointment scheduled w/ Dr. Francisco in July    Will need ongoing diuresis, likely increased dose to help achieved dry weight (undetermined at this time)    Pt should return to clinic in 3 months for a follow up.    I appreciate the opportunity to participate in the care of Katt Dsouza.  Please, feel free to contact me at any time.    Melvina Sommers MD  Pulmonary and Critical Care   ______________________________________________________________________________    CC: Exertional dyspnea    HPI:   Katt Dsouza is a 78 y.o. former smoker with history of anxiety, obesity, GERD, HTN, T2DM, presenting today for evaluation of exertional dyspnea.  Her dyspnea started approximately 6 months ago.  Is associated with chest tightness, but no chest pain no wheezing and no cough.  It is particularly notable on inclines.  Approximately 2 months ago she was started on a PRN albuterol inhaler and finds it quite helpful.  Several weeks ago she was also started on furosemide since her exertional dyspnea was also associated with increasing swelling in her aches.  Furosemide has been helpful in improving her  shortness of breath, and patient states that she definitely notices when she misses her Lasix dose.  Additionally she admits to chronic sinus congestion, for which she uses daily Flonase.    Denies any issues with sleep, states that she sleeps soundly without any awakenings, no prior reports of snoring, no prior reports of apneic episodes.  She wakes up rested.  Denies any daytime somnolence.    She is a former smoker with approximately 10-pack-year history and significant exposure to secondhand smoke.  She quit smoking in the early .  She denies any childhood history of asthma.    ROS:  Review of Systems - 10 point ROS reviewed and noted to be negative w/ exceptions as detailed in the HPI.    PMH:    Type 2 diabetes mellitus    Anxiety    Diverticulosis    Hypertension    GERD    PSH:    Cholecystectomy     Appendectomy     Left carpal tunnel surgery     Left L4-L5, left L5-S1 far lateral foraminotomies, partial laminotomy, partial facetectomy, and decompression in     L4-L5 central and left paracentral spinal decompression, 2015    Allergies:  Allergies   Allergen Reactions     Penicillins Hives     Family HX: Notable for CHF, diabetes mellitus    Social Hx:  Social History     Socioeconomic History     Marital status:      Spouse name: Not on file     Number of children: Not on file     Years of education: Not on file     Highest education level: Not on file   Occupational History     Not on file   Social Needs     Financial resource strain: Not on file     Food insecurity:     Worry: Not on file     Inability: Not on file     Transportation needs:     Medical: Not on file     Non-medical: Not on file   Tobacco Use     Smoking status: Former Smoker     Last attempt to quit: 1995     Years since quittin.4     Smokeless tobacco: Never Used   Substance and Sexual Activity     Alcohol use: Not on file     Drug use: Not on file     Sexual activity: Not on file   Lifestyle      "Physical activity:     Days per week: Not on file     Minutes per session: Not on file     Stress: Not on file   Relationships     Social connections:     Talks on phone: Not on file     Gets together: Not on file     Attends [employer] service: Not on file     Active member of club or organization: Not on file     Attends meetings of clubs or organizations: Not on file     Relationship status: Not on file     Intimate partner violence:     Fear of current or ex partner: Not on file     Emotionally abused: Not on file     Physically abused: Not on file     Forced sexual activity: Not on file   Other Topics Concern     Not on file   Social History Narrative     Not on file       Current Meds:  Medication Sig     albuterol (PROAIR HFA;PROVENTIL HFA;VENTOLIN HFA) 90 mcg/actuation inhaler INHALE 2 PUFFS BY INHALATION ROUTE BEFORE EXERCISE AND EVERY 4 HOURS IF NEEDED     amLODIPine (NORVASC) 5 MG tablet Take 5 mg by mouth daily.     aspirin 81 MG EC tablet Take 81 mg by mouth daily.     atorvastatin (LIPITOR) 20 MG tablet Take 20 mg by mouth at bedtime.     fluticasone (FLONASE) 50 mcg/actuation nasal spray Apply 1 spray into each nostril daily.     multivitamin (ONE A DAY) per tablet Take 1 tablet by mouth daily.     ranitidine (ZANTAC) 150 MG tablet Take 150 mg by mouth 2 (two) times a day.     zolpidem (AMBIEN CR) 12.5 MG CR tablet Take 12.5 mg by mouth at bedtime as needed for sleep.     fluticasone furoate-vilanterol (BREO ELLIPTA) 200-25 mcg/dose DsDv inhaler Inhale 1 puff daily.     furosemide (LASIX) 20 MG tablet Take 20 mg by mouth daily.     irbesartan-hydrochlorothiazide (AVALIDE) 150-12.5 mg per tablet Take 1 tablet by mouth 2 (two) times a day.     metFORMIN (GLUCOPHAGE) 500 MG tablet Take 500 mg by mouth 2 (two) times a day with meals.     Physical Exam:  /64   Pulse 68   Resp 24   Ht 5' 5\" (1.651 m)   Wt 211 lb 12.8 oz (96.1 kg)   SpO2 94% Comment: RA  BMI 35.25 kg/m    Gen: well-dressed woman, " alert, oriented, no distress  HEENT: nasal turbinates are unremarkable, no oropharyngeal lesions, no cervical or supraclavicular lymphadenopathy; no stridor  CV: RRR, no M/G/R  Resp: equal bilateral air entry, breath sounds clear throughout, no focal crackles or wheezes; able to converse in full sentences w/ no respiratory distress  Abd: soft, nontender, no palpable organomegaly  Skin: no apparent rashes  Ext: no cyanosis, clubbing, 2+ pitting lower extremity edema, symmetric  Neuro: alert, nonfocal    Labs: personally reviewed in the EMR.    Imaging studies: personally reviewed.  No recent pertinent chest radiographic studies available.    #. 12/2018: Pharmacologic nuclear stress test is negative for inducible myocardial ischemia or infarction.  Left ventricular ejection fraction is 69%.    PFT's (6/12/2019): Difficult to test due to patient issues with performing the needed maneuvers.  No evidence of COPD.  Negative bronchodilator response.  Unable to assess lung volumes.  Mildly reduced diffusion capacity.  FEV1/FVC is 0.68 and is normal. FEV1 is 1.45 L (69 % predicted) and is reduced. FVC is 2.14 L (78 % predicted) and reduced. There was no improvement in spirometry after a single inhaled dose of bronchodilator. TLC could not be accurately tested. DLCO is 62 % predicted and is reduced when it is corrected for hemoglobin.

## 2021-05-29 NOTE — PATIENT INSTRUCTIONS - HE
Thank you for coming in for your appointment to discuss your shortness of breath.     Your lung function testing showed no evidence of COPD. There is no clear evidence of asthma. We were unable to get accurate measurements of your lung volumes (lung size). Testing showed reduced diffusion capacity (measure of how well the oxygen passes through the lung tissue). I am concerned that your breathing is impacted by both your heart & potentially from your airways. I am going to treat you for asthma as well.    Plan:     Start Breo ellipta once daily inhaler -- you will use it once a day every day, even if your breathing is doing well    Continue with albuterol as needed for acute shortness of breath    Keep your Cardiology appointment    You should follow up in 3 months.     If you have any questions or concerns, please, call our clinic at [phone].

## 2021-05-30 NOTE — PATIENT INSTRUCTIONS - HE
Nice to visit with you today.  We talked about monitoring her weight on a daily basis and calling if your weight go up by more than 3 to 4 pounds in a few day period of time as this is likely water weight.  We talked about weight loss and visiting the in the bariatric clinic.  We talked about continuing regular exercise.  We are going to plan an echocardiogram or an ultrasound of your heart as well as a CT picture of the blood vessels of your heart.  I will comment once they are available.  If you have any questions or concerns or worsening symptoms please contact my nurse.  Her name is Farhana and her number is [phone]

## 2021-05-31 NOTE — PATIENT INSTRUCTIONS - HE
Please call my nurse Farhana today and review your full medication list.Please make a list of your medicines and keep it with you.We talked about weighing yourself each morning and calling if weight is increased by more than 3 to5 pounds in a few day period of time.we talked about watching the salt in your diet and ok to stop aspirin.Her number is [phone]

## 2021-05-31 NOTE — PATIENT INSTRUCTIONS - HE
Today you were seen for follow up of your asthma. I am glad you are doing better, but seems that we can do a bit better for your asthma control.    We use three classes of medications to treat asthma. Both Breo ellipta and other medications such as Symbicort and Advair contain 2 of these medications. I think it would be reasonable to add on the third class (also in an inhaler form) to see if we can get you under better control with your breathing.    Plan:    Let's switch your Breo ellipta to Symbicort -- it contains the 2 of the inhaler classes, but unlike Breo you have to take it as 2 puffs twice a day (every day). Hopefully, this medication would be less expensive. If you start Symbicort, you will stop your Breo ellipta. Gargle your mouth and throat after using that inhaler to prevent thrush.    I am adding on another inhaler called Spiriva to see if we can improve your asthma. This is a once daily inhaler that you use in addition to either Breo or Symbicort.     Continue with the albuterol as needed.    If you notice some allergy symptoms, you can try using an over-the-counter CETIRIZINE.    Please, remember that appropriate use of inhalers is essential to their efficacy. If you cannot remember the instructions on how to properly use the prescribed inhalers, you can visit your pharmacist to request a demonstration, or you can review a video online. There are many helpful videos on YouTube that you can access to review instructions and demonstrations on the use of different inhalers. You should exercise common sense when looking for videos -- best and most informative inhaler videos come from health care organizations.    You should follow up in 4 months.     If you have any questions or concerns, please, call our clinic at [phone].

## 2021-05-31 NOTE — PROGRESS NOTES
"Pulmonary Clinic Outpatient Follow-Up  8/29/2019     Assessment and Plan:   79 year old former smoker with the most pertinent history of obesity, diastolic heart failure, and recently diagnosed asthma, presenting for follow up of asthma.    #.  Asthma, moderate persistent, improved control with ICS/LABA.  Based on patient's asthma severity and her current symptoms, she would benefit from step up in therapy.      Switch Breo ellipta to Symbicort (see see if this has a more affordable co-pay)    Add Spiriva (or Incruse) to inhaler regimen for step up on asthma therapy    Patient was provided with hands-on education on appropriate inhaler use    Continue with PRN albuterol    Recommended trying OTC cetirizine if she notices allergy symptoms    Patient already received her influenza vaccination    Patient should return to clinic in 4 months for a follow up.    I appreciate the opportunity to participate in the care of Katt Dsouza.  Please, feel free to contact me at any time.    Melvina Sommers MD  Pulmonary and Critical Care   ______________________________________________________________________________    CC: asthma follow up    HPI:   Katt Dsouza is a 79 y.o. former smoker with history of anxiety, obesity, GERD, HTN, type 2 diabetes mellitus, and recently diagnosed mild persistent asthma with diastolic heart failure, presenting today for follow up of her asthma.  Last seen 06/2019 and started on Breo ellipta w/ PRN albuterol. She is following regularly w/ the Cardiology clinic.    Patient reports that overall her breathing has improved since initiation of Breo.  Her breathing is still variable, however she has more frequent \"good days\".  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  She still using albuterol approximately once a day several times a week.  Her ACT score is 3+ 3+5+3+3 " equal 17.  Patient's main question is to see whether her Breo can be switched to a different medication that would not have as high of a co-pay with that.  She would like to see if we can get her asthma under better control.    ROS:  Review of Systems - 10-point ROS reviewed and found to be negative w/ exception as detailed in the HPI.    PMH:    Type 2 diabetes mellitus    Anxiety    Diverticulosis    Hypertension    GERD     PSH:    Cholecystectomy     Appendectomy     Left carpal tunnel surgery     Left L4-L5, left L5-S1 far lateral foraminotomies, partial laminotomy, partial facetectomy, and decompression in     L4-L5 central and left paracentral spinal decompression, 2015     Allergies:       Allergies   Allergen Reactions     Penicillins Hives     Family HX: Notable for CHF, diabetes mellitus    Social Hx:  Social History     Socioeconomic History     Marital status:      Spouse name: Not on file     Number of children: Not on file     Years of education: Not on file     Highest education level: Not on file   Occupational History     Not on file   Social Needs     Financial resource strain: Not on file     Food insecurity:     Worry: Not on file     Inability: Not on file     Transportation needs:     Medical: Not on file     Non-medical: Not on file   Tobacco Use     Smoking status: Former Smoker     Last attempt to quit: 1995     Years since quittin.6     Smokeless tobacco: Never Used   Substance and Sexual Activity     Alcohol use: Not on file     Drug use: Not on file     Sexual activity: Not on file   Lifestyle     Physical activity:     Days per week: Not on file     Minutes per session: Not on file     Stress: Not on file   Relationships     Social connections:     Talks on phone: Not on file     Gets together: Not on file     Attends [employer] service: Not on file     Active member of club or organization: Not on file     Attends meetings of clubs or organizations: Not  "on file     Relationship status: Not on file     Intimate partner violence:     Fear of current or ex partner: Not on file     Emotionally abused: Not on file     Physically abused: Not on file     Forced sexual activity: Not on file   Other Topics Concern     Not on file   Social History Narrative     Not on file       Current Meds:  Current Outpatient Medications   Medication Sig Dispense Refill     acetaminophen-codeine (TYLENOL #3) 300-30 mg per tablet Take 1 tablet by mouth at bedtime as needed.  0     albuterol (PROAIR HFA;PROVENTIL HFA;VENTOLIN HFA) 90 mcg/actuation inhaler INHALE 2 PUFFS BY INHALATION ROUTE BEFORE EXERCISE AND EVERY 4 HOURS IF NEEDED  1     amLODIPine (NORVASC) 5 MG tablet Take 5 mg by mouth daily.       atorvastatin (LIPITOR) 20 MG tablet Take 20 mg by mouth at bedtime.       fluticasone (FLONASE) 50 mcg/actuation nasal spray Apply 1 spray into each nostril daily.       fluticasone furoate-vilanterol (BREO ELLIPTA) 200-25 mcg/dose DsDv inhaler Inhale 1 puff daily. 60 each 5     furosemide (LASIX) 20 MG tablet Take 20 mg by mouth daily.  0     losartan-hydrochlorothiazide (HYZAAR) 100-12.5 mg per tablet Take 1 tablet by mouth daily.  0     metFORMIN (GLUCOPHAGE) 500 MG tablet Take 500 mg by mouth 2 (two) times a day with meals.       multivitamin (ONE A DAY) per tablet Take 1 tablet by mouth daily.       ranitidine (ZANTAC) 150 MG tablet Take 150 mg by mouth 2 (two) times a day.       zolpidem (AMBIEN CR) 12.5 MG CR tablet Take 10 mg by mouth at bedtime as needed for sleep.              zolpidem (AMBIEN) 10 mg tablet Take 1 tablet by mouth as needed.  1     No current facility-administered medications for this visit.      Physical Exam:  /80   Pulse 71   Resp 12   Ht 5' 4.5\" (1.638 m)   SpO2 95%   Breastfeeding? No   BMI 36.17 kg/m    Gen: obese  woman, alert, oriented, no distress  HEENT: no oropharyngeal lesions, no cervical or supraclavicular lymphadenopathy; no " stridor  CV: RRR, no M/G/R  Resp: equal bilateral air entry, breath sounds clear throughout, no focal crackles or wheezes. Talking in full sentences w/ no respiratory distress  Abd: soft, nontender, no palpable organomegaly  Skin: no apparent rashes  Ext: no cyanosis, clubbing/ trace BLE edema  Neuro: alert, nonfocal    Labs: personally reviewed in the EMR.     Imaging studies: personally reviewed.  No recent pertinent chest radiographic studies available.  #. CTA coronary w/ calcium score, 08/2019:   The total Agatston calcium score is 0. A calcium score of zero places the individual in the lowest quartile when compared to an age and gender matched control group and implies a low risk of cardiac events in the next 10 years. (less than 1% per year). No significant obstructive plaque or stenosis within the visualized portions of the coronary tree. Mitral annular calcification. Moderate calcification of the ascending and descending thoracic aorta. In the descending thoracic aorta there is soft and hardened plaque.     #. TTE, 07/2019:  Left ventricle ejection fraction is normal. The calculated left ventricular ejection fraction is 72%. TAPSE is normal, which is consistent with normal right ventricular systolic function. Aortic valve not well visualized, however probably mild aortic stenosis with mild aortic insufficiency. Cannot rule out some contribution to gradient from mid ventricular gradient.    #. 12/2018: Pharmacologic nuclear stress test is negative for inducible myocardial ischemia or infarction.  Left ventricular ejection fraction is 69%.     PFT's (6/12/2019): Difficult to test due to patient issues with performing the needed maneuvers.  No evidence of COPD. Negative bronchodilator response.  Unable to assess lung volumes.  Mildly reduced diffusion capacity.  FEV1/FVC is 0.68 and is normal. FEV1 is 1.45 L (69 % predicted) and is reduced. FVC is 2.14 L (78 % predicted) and reduced. There was no improvement  in spirometry after a single inhaled dose of bronchodilator. TLC could not be accurately tested. DLCO is 62 % predicted and is reduced when it is corrected for hemoglobin.

## 2021-05-31 NOTE — PROGRESS NOTES
Patient instructed in use of symbicort inhaler with a spacer device.  Patient states good understanding of how to use symbicort with a spacer device.  Phone numbers given to call with any questions as well.

## 2021-06-02 VITALS — HEIGHT: 65 IN | BODY MASS INDEX: 30.82 KG/M2 | WEIGHT: 185 LBS

## 2021-06-03 VITALS — HEIGHT: 65 IN | BODY MASS INDEX: 35.65 KG/M2 | WEIGHT: 214 LBS

## 2021-06-03 VITALS — WEIGHT: 211.8 LBS | HEIGHT: 65 IN | BODY MASS INDEX: 35.29 KG/M2

## 2021-06-03 VITALS — BODY MASS INDEX: 35.65 KG/M2 | WEIGHT: 214 LBS | HEIGHT: 65 IN

## 2021-06-03 VITALS — HEIGHT: 65 IN | BODY MASS INDEX: 36.17 KG/M2

## 2021-06-04 VITALS — HEIGHT: 65 IN | WEIGHT: 204 LBS | BODY MASS INDEX: 33.99 KG/M2

## 2021-06-04 VITALS
DIASTOLIC BLOOD PRESSURE: 79 MMHG | HEART RATE: 71 BPM | BODY MASS INDEX: 34.48 KG/M2 | WEIGHT: 204 LBS | SYSTOLIC BLOOD PRESSURE: 151 MMHG

## 2021-06-05 VITALS
RESPIRATION RATE: 20 BRPM | HEIGHT: 65 IN | SYSTOLIC BLOOD PRESSURE: 122 MMHG | BODY MASS INDEX: 36.44 KG/M2 | HEART RATE: 88 BPM | WEIGHT: 218.7 LBS | DIASTOLIC BLOOD PRESSURE: 50 MMHG

## 2021-06-05 VITALS
RESPIRATION RATE: 16 BRPM | HEIGHT: 65 IN | WEIGHT: 201 LBS | BODY MASS INDEX: 33.49 KG/M2 | SYSTOLIC BLOOD PRESSURE: 122 MMHG | DIASTOLIC BLOOD PRESSURE: 58 MMHG | HEART RATE: 84 BPM

## 2021-06-05 VITALS
WEIGHT: 210 LBS | SYSTOLIC BLOOD PRESSURE: 136 MMHG | DIASTOLIC BLOOD PRESSURE: 60 MMHG | BODY MASS INDEX: 34.99 KG/M2 | OXYGEN SATURATION: 95 % | HEIGHT: 65 IN | HEART RATE: 73 BPM

## 2021-06-05 VITALS — HEIGHT: 65 IN | BODY MASS INDEX: 36.44 KG/M2 | WEIGHT: 218.7 LBS

## 2021-06-06 ENCOUNTER — HEALTH MAINTENANCE LETTER (OUTPATIENT)
Age: 81
End: 2021-06-06

## 2021-06-06 NOTE — TELEPHONE ENCOUNTER
Central PA team  [phone]  Pool: HE PA MED ([id number])          PA has been initiated.       PA form completed and faxed insurance via Cover My Meds     Key:  [id number]     Medication:  BREO ELLIPTA     Insurance:  [facility] [place]        Response will be received via fax and may take up to 5-10 business days depending on plan

## 2021-06-06 NOTE — TELEPHONE ENCOUNTER
Prior Authorization Request  Who s requesting:  Pharmacy  Pharmacy Name and Location: [facility]  Medication Name: breo ellipta inhaler 200-25mcg  Insurance Plan: [employer] [facility]  Insurance Member ID Number:  [id number]  CoverMyMeds Key: N/A  Informed patient that prior authorizations can take up to 10 business days for response:   No  Okay to leave a detailed message: No

## 2021-06-07 NOTE — PATIENT INSTRUCTIONS - HE
It was nice to visit with you today.  We talked about monitoring your blood pressure with 6-10 blood pressure readings over the next few weeks.  Would recommend checking your blood pressure at different times during the day after being seated for at least 10 minutes.  Please contact my nurse or Helen with the results and we can make additional decisions regarding your blood pressure.  I would like you to follow-up in 3 to 4 months in the office once things settle down with a virus and then we can make additional decisions from there.  The best way to reach my nurse Farhana is [phone]

## 2021-06-09 NOTE — PROGRESS NOTES
"Katt Dsouza is a 79 y.o. female who is being evaluated via a billable video visit.      The patient has been notified of following:     \"This video visit will be conducted via a call between you and your physician/provider. We have found that certain health care needs can be provided without the need for an in-person physical exam.  This service lets us provide the care you need with a video conversation.  If a prescription is necessary we can send it directly to your pharmacy.  If lab work is needed we can place an order for that and you can then stop by our lab to have the test done at a later time.    Video visits are billed at different rates depending on your insurance coverage. Please reach out to your insurance provider with any questions.    If during the course of the call the physician/provider feels a video visit is not appropriate, you will not be charged for this service.\"    Patient has given verbal consent to a Video visit? Yes  How would you like to obtain your AVS? AVS Preference: MyChart.  Patient would like the video invitation sent by: Text to cell phone: [phone]  Will anyone else be joining your video visit? No        Video Start Time: 9:34 AM    Additional provider notes:   Katt Dsouza was last seen on 08/2019 for discussion of her asthma.     Katt Dsouza reports that for the last 1-2 months she has been having intermittent issues with her breathing. She describes short-lived episodes of dyspnea that seem to occur regardless of activity several times per day. She thinks that some of it is related to the humidity & some of it may be related to her anxiety around the coronavirus. They do not seem to be associated w/ wheezing or cough. They occur inside & outside. They only occur during the day.    She uses her Breo ellipta daily, but has not started Spiriva because it was not covered by her insurance. She uses albuterol a couple of times per week, but thinks she should probably use it " more frequently. Albuterol if very helpful.     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 She has asthma & T2DM, placing her in a higher risk category for acquiring coronavirus & becoming more affected by it. If schools go to in-person classes, she would like a letter from our clinic -- she then would be able to go on unemployment.    Limited physical examination:  General: pleasant older woman in no acute distress  HEENT: MMM, normal voice, EOMI  Resp: no accessory muscle use; able to talk in full sentences; no audible wheezing  Neuro: AOx3  Psych: calm    Assessment/Plan:  79 y.o. former smoker with most pertinent history of asthma, obesity, & diastolic HF, being evaluated via video for asthma. Seems to have improved asthma controlled, but is still having respiratory symptoms.       Will try to switch from Breo ellipta to Trelegy    IF Trelegy is not covered by her insurance, she should continue Breo ellipta, AND we will send her a prescription for Incruse    Continue PRN albuterol    Start daily nasal irrigation/rinses, daily Flonase, & daily cetirizine    RTC in 1-2 months if still having daily symptoms, or in 12 months if above measures resolve her issues    Video-Visit Details    Type of service:  Video Visit    Video End Time (time video stopped): 9:56 AM  Originating Location (pt. Location): Home    Distant Location (provider location):  [facility]     Platform used for Video Visit: Sharron Sommers MD

## 2021-06-09 NOTE — TELEPHONE ENCOUNTER
Called and spoke with Katt. She had called last week to let Dr. Sommers know that her insurance company denied the Trelegy ellipta inhaler.   She will be going back to using the Breo inhaler.  Dr. Sommers would like her to add Incruse to the Breo daily.      Katt states she actually did  the Trelegy ellipta inhaler for $98 and has been using that.  She would like to do an appeal to try and get the Trelegy paid for.  Also asked me to send in Rx for the Incruse ellipta so she can compare the cost of the TRelegy vs. Breo plus the Incruse.

## 2021-06-09 NOTE — PATIENT INSTRUCTIONS - HE
Pulmonary concerns: asthma, congestion    Plan:    IF your insurance covers it, I would like to switch your Breo ellipta (2 active ingredients) to Trelegy (3 active ingredients).     If Trelegy is approved, I want you to start using it -- 1 puff once a day, every day. You will need to gargle & rinse your mouth & throat after using it to prevent thrush. Once you start Trelegy, you should stop using your Breo ellipta. You can continue using your albuterol as needed.    If Trelegy is not approved, I want you to continue taking you once daily Breo ellipta (remember to gargle & rinse your mouth & throat afterwards) and we will add a medication called Incruse to help improve your symptoms.    If you start Incruse, you will use it once daily, every day.  I recommend trying nasal irrigation to help with your nasal/sinus symptoms. Nasal irrigation can help wash away dirt, allergens, and mucus from your nose & communicating sinuses.   Make sure to follow all the instructions provided with the irrigation devices for cleaning, use the premixed solution packets, & follow all the instructions regarding appropriate water preparation. Do NOT add any oils (essential or mineral) to the solution.   I recommend performing nasal irrigation at least 1-2 times per day, shortly before you use your nasal spray(s).     You can safely use nasal irrigation several times a day for your symptoms.     There are many devices available over-the-counter for nasal irrigation: a neti pot, a squeeze bottle, and pulsating irrigation devices. You should ask a pharmacist to help you find a device to use.     There are numerous videos available online to help you use the irrigation devices correctly.    I want to start Flonase nasal spray for your sinus symptoms as well. Flonase (fluticasone) is a steroid nasal spray that works by reducing inflammation in nasal passages. Because it is a steroid, you will not feel the effects immediately -- it can take 2-7  days before you start to notice the results. It works best when used regularly (daily) to maintain the anti-inflammatory effect. If you use nasal irrigation devices such as neti pot, you should irrigate your nasal passages BEFORE using your Flonase spray.     I also want you to start a daily allergy pill, cetirizine (Zyrtec), to help with possible allergic component to your symptoms. Try taking it daily for at least a month to see if it helps (even partially) help your breathing, cough, and/or congestion.     Let's keep in touch regarding your plans to go back to work. 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    It is very important to use good technique when using inhalers. If you cannot remember the instructions provided in clinic, you can visit your pharmacist to request a demonstration, or you can review a video online. You should exercise common sense when looking for videos -- best and most informative inhaler videos come from health care organizations. [employer] has videos posted by [employer] & [facility] (hospital organization) that provide detailed demonstrations.    You should follow up in 1-2 months if your breathing is still problematic. If you are doing well, let's plan to touch base in 1 year.     If you have any questions or concerns, please, call our clinic at [phone].

## 2021-06-10 NOTE — TELEPHONE ENCOUNTER
"Patient is calling requesting COVID serologic antibody testing.  NOTE: Serologic testing is a blood test for 'antibodies' which are made at 10-14 days after you have had symptoms of COVID or were exposed and had an asymptomatic infection.  This does NOT test you for 'active' infection or tell you if you are contagious.    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  No  Did you previously have cough, fever, body aches, shortness of breath, or difficulty breathing that have now resolved? Has had previous covid symptoms.   Symptoms began in march  days ago.  Symptoms started < 14 days ago.  Recommend they wait for testing until it has been 14 days as it can take 2 weeks after symptoms or exposure for the test to be positive. Call back after 14 days          The patient was informed: \"Testing is limited each day and it may take time for testing to be available to everyone who has called. You will receive a call within 48-72 hours to schedule the serology testing. Please confirm the best number to reach you is [phone]. If you have any questions about scheduling, call [phone].\"     "

## 2021-06-10 NOTE — PROGRESS NOTES
"Katt Dsouza is a 80 y.o. female who is being evaluated via a billable video visit.      The patient has been notified of following:     \"This video visit will be conducted via a call between you and your physician/provider. We have found that certain health care needs can be provided without the need for an in-person physical exam.  This service lets us provide the care you need with a video conversation.  If a prescription is necessary we can send it directly to your pharmacy.  If lab work is needed we can place an order for that and you can then stop by our lab to have the test done at a later time.    Video visits are billed at different rates depending on your insurance coverage. Please reach out to your insurance provider with any questions.    If during the course of the call the physician/provider feels a video visit is not appropriate, you will not be charged for this service.\"          "

## 2021-06-12 NOTE — TELEPHONE ENCOUNTER
Central PA team  [phone]  Pool: HE PA MED ([id number])          PA has been initiated.       PA form completed and faxed insurance via Cover My Meds     Key:  [id number]     Medication:  SPIRIVA RESPIMAT    Insurance:  [employer]        Response will be received via fax and may take up to 5-10 business days depending on plan

## 2021-06-12 NOTE — PATIENT INSTRUCTIONS - HE
Pulmonary concerns: asthma, sub-optimal control    Plan:    Let's try switching your Trelegy to Symbicort + Spiriva combination. This will keep you an a triple inhaler regimen, but will allow us to make things more affordable (hopefully) & perhaps find a better combination that works for you.   Start SYMBICORT 2 puffs TWICE daily. This is an inhaler you will use everyday, even if your breathing feels fine. You will not feel the effects of this inhaler immediately -- it works in the background to help reduce the inflammation in the airways and to help open up the airways. It does not work better if you use it more frequently than prescribed. If you have acute breathing issues, you should use your rescue inhaler (albuterol).     After using SYMBICORT, remember to thoroughly gargle your mouth and your throat to wash away any medication particles, reduce risk of developing thrush (a yeast infection in the mouth), & to prevent voice hoarseness. Some people find it helpful to drink a few sips of warm water after gargling to clean the throat more thoroughly.   Start SPIRIVA 2 puffs ONCE daily. This is an inhaler you will use everyday, even if your breathing feels fine. You will not feel the effects of this inhaler immediately -- it works in the background to help reduce the inflammation in the airways and to help open up the airways. It does not work better if you use it more frequently than prescribed. If you have acute breathing issues, you should use your rescue inhaler (albuterol).   Start using ALBUTEROL inhaler as needed for acutely worsened shortness of breath, coughing, or wheezing. This is a rescue inhaler that starts working within 5-15 minutes of administration. Its effects last for 4-6 hours. You can use it even if you are using your maintenance inhaler (Symbicort + Spiriva).  Try to use your albuterol about 15 minutes before exercise to see if this helps your exercise routine.     Keep using your Flonase as  you are.    Once you are on a stable inhaler regimen, try using Zyrtec (cetirizine) to see if it helps with an environmental allergic component. You can try other generic options of Allegra or others. Just make sure it is non-drowsy    It is very important to use good technique when using inhalers. If you cannot remember the instructions provided in clinic, you can visit your pharmacist to request a demonstration, or you can review a video online. You should exercise common sense when looking for videos -- best and most informative inhaler videos come from health care organizations. [employer] has videos posted by [employer] & [facility] (hospital organization) that provide detailed demonstrations.    You should follow up in 3 months.     If you have any questions or concerns, please, call our clinic at [phone].

## 2021-06-12 NOTE — PROGRESS NOTES
Pulmonary Clinic Outpatient Follow-Up  10/13/2020     Assessment and Plan:   79 year old former smoker with the most pertinent history of obesity, diastolic heart failure, and recently diagnosed asthma, presenting for follow up of asthma.    #.  Asthma, moderate persistent, suboptimal control with Trelegy.       Switch Trelegy to combination of high-dose Symbicort twice daily and Spiriva Respimat today    Continue with as needed albuterol (patient advised to try using the rescue inhaler about 15 minutes before exercise to see if this helps)    Continue with twice daily Flonase    Continue with nasal rinses    Advised to trial of cetirizine or other similar antihistamine to see if it helps with some environmental allergy component    Patient already had a flu shot this year    Patient should return to clinic in 3 months for a follow up.    I appreciate the opportunity to participate in the care of Katt Dsouza.  Please, feel free to contact me at any time.    Melvina Sommers MD  Pulmonary and Critical Care   ______________________________________________________________________________    CC: asthma follow up    HPI:   Katt Dsouza is a 80 y.o. former smoker with history of anxiety, obesity, GERD, HTN, type 2 diabetes mellitus, and recently diagnosed mild persistent asthma with diastolic heart failure, presenting today for follow up of her asthma.  Last seen 07/2020 -- switched to from Breo to Trelegy, asked to start nasal irrigation, Flonase, & cetirizine.     Reports her breathing has been worse over the last few months. She has notable exertional dyspnea even w/ a block. She is using her albuterol once a twice a week. No nocturnal problems. She feels that Trelegy was not an improvement on Breo ellipta. She has intermittent bouts of cough, but these are rare. These bouts are not associated w/ activity. She had issues w/ her sinuses this summer. She saw an ENT -- was told that her sinuses were narrowing. She was on 2  courses of steroids. She is not using Zyrtec, but she is using Flonase 2x/day. She does nasal rinses fairly regularly. No new peripheral swelling.  She is currently not working due to her high risk profile.    ROS:  Review of Systems - 10-point ROS reviewed and found to be negative w/ exception as detailed in the HPI.    PMH:    Type 2 diabetes mellitus    Anxiety    Diverticulosis    Hypertension    GERD     PSH:    Cholecystectomy     Appendectomy     Left carpal tunnel surgery     Left L4-L5, left L5-S1 far lateral foraminotomies, partial laminotomy, partial facetectomy, and decompression in     L4-L5 central and left paracentral spinal decompression, 2015     Allergies:       Allergies   Allergen Reactions     Penicillins Hives     Family HX: Notable for CHF, diabetes mellitus    Social Hx:  Social History     Socioeconomic History     Marital status:      Spouse name: Not on file     Number of children: Not on file     Years of education: Not on file     Highest education level: Not on file   Occupational History     Not on file   Social Needs     Financial resource strain: Not on file     Food insecurity     Worry: Not on file     Inability: Not on file     Transportation needs     Medical: Not on file     Non-medical: Not on file   Tobacco Use     Smoking status: Former Smoker     Quit date: 1995     Years since quittin.8     Smokeless tobacco: Never Used   Substance and Sexual Activity     Alcohol use: Not on file     Drug use: Not on file     Sexual activity: Not on file   Lifestyle     Physical activity     Days per week: Not on file     Minutes per session: Not on file     Stress: Not on file   Relationships     Social connections     Talks on phone: Not on file     Gets together: Not on file     Attends Yazidi service: Not on file     Active member of club or organization: Not on file     Attends meetings of clubs or organizations: Not on file     Relationship status:  "Not on file     Intimate partner violence     Fear of current or ex partner: Not on file     Emotionally abused: Not on file     Physically abused: Not on file     Forced sexual activity: Not on file   Other Topics Concern     Not on file   Social History Narrative     Not on file       Current Meds:  Current Outpatient Medications   Medication Sig Dispense Refill     acetaminophen-codeine (TYLENOL #3) 300-30 mg per tablet Take 1 tablet by mouth at bedtime as needed.  0     albuterol (PROAIR HFA;PROVENTIL HFA;VENTOLIN HFA) 90 mcg/actuation inhaler INHALE 2 PUFFS BY INHALATION ROUTE BEFORE EXERCISE AND EVERY 4 HOURS IF NEEDED 1 Inhaler 1     amLODIPine (NORVASC) 5 MG tablet Take 5 mg by mouth daily.       atorvastatin (LIPITOR) 20 MG tablet Take 20 mg by mouth at bedtime.       fluticasone (FLONASE) 50 mcg/actuation nasal spray Apply 1 spray into each nostril daily.       fluticasone furoate-vilanteroL (BREO ELLIPTA) 200-25 mcg/dose DsDv inhaler Inhale 1 puff daily. 60 each 5     fluticasone propionate (FLONASE) 50 mcg/actuation nasal spray 2 sprays into each nostril daily. 16 g 12     fluticasone-umeclidinium-vilanterol (TRELEGY ELLIPTA) 100-62.5-25 mcg DsDv inhaler Inhale 1 Inhalation daily. 60 each 6     furosemide (LASIX) 20 MG tablet Take 20 mg by mouth daily.  0     losartan-hydrochlorothiazide (HYZAAR) 100-12.5 mg per tablet Take 1 tablet by mouth daily.  0     metFORMIN (GLUCOPHAGE) 500 MG tablet Take 500 mg by mouth 2 (two) times a day with meals.       multivitamin (ONE A DAY) per tablet Take 1 tablet by mouth daily.       umeclidinium (INCRUSE ELLIPTA) 62.5 mcg/actuation DsDv inhaler Inhale 1 puff daily. 30 each 11     zolpidem (AMBIEN) 10 mg tablet Take 1 tablet by mouth as needed.  1     No current facility-administered medications for this visit.      Physical Exam:  /60   Pulse 73   Ht 5' 5\" (1.651 m)   Wt 210 lb (95.3 kg)   SpO2 95% Comment: RA  BMI 34.95 kg/m    Gen: obese  woman, " alert, oriented, no distress  HEENT: no oropharyngeal lesions, no cervical or supraclavicular lymphadenopathy; no stridor  CV: RRR, no M/G/R  Resp: equal bilateral air entry, breath sounds clear throughout, no focal crackles or wheezes. Talking in full sentences w/ no respiratory distress  Abd: soft, nontender, no palpable organomegaly  Skin: no apparent rashes  Ext: no cyanosis, clubbing/ trace BLE edema  Neuro: alert, nonfocal    Labs: personally reviewed in the EMR.     Imaging studies: personally reviewed.  No recent pertinent chest radiographic studies available.  #. CTA coronary w/ calcium score, 08/2019:   The total Agatston calcium score is 0. A calcium score of zero places the individual in the lowest quartile when compared to an age and gender matched control group and implies a low risk of cardiac events in the next 10 years. (less than 1% per year). No significant obstructive plaque or stenosis within the visualized portions of the coronary tree. Mitral annular calcification. Moderate calcification of the ascending and descending thoracic aorta. In the descending thoracic aorta there is soft and hardened plaque.     #. TTE, 07/2019:  Left ventricle ejection fraction is normal. The calculated left ventricular ejection fraction is 72%. TAPSE is normal, which is consistent with normal right ventricular systolic function. Aortic valve not well visualized, however probably mild aortic stenosis with mild aortic insufficiency. Cannot rule out some contribution to gradient from mid ventricular gradient.    #. 12/2018: Pharmacologic nuclear stress test is negative for inducible myocardial ischemia or infarction.  Left ventricular ejection fraction is 69%.     PFT's (6/12/2019): Difficult to test due to patient issues with performing the needed maneuvers.  No evidence of COPD. Negative bronchodilator response.  Unable to assess lung volumes.  Mildly reduced diffusion capacity.  FEV1/FVC is 0.68 and is normal. FEV1  is 1.45 L (69 % predicted) and is reduced. FVC is 2.14 L (78 % predicted) and reduced. There was no improvement in spirometry after a single inhaled dose of bronchodilator. TLC could not be accurately tested. DLCO is 62 % predicted and is reduced when it is corrected for hemoglobin.

## 2021-06-12 NOTE — TELEPHONE ENCOUNTER
Prior Authorization Request  Who s requesting:  Patient and Pharmacy  Pharmacy Name and Location: [facility].  Medication Name: Spiriva respimat  Insurance Plan: [employer] [place]                          Insurance Member ID Number:  [id number]  CoverMyMeds Key: N/A  Informed patient that prior authorizations can take up to 10 business days for response:   Yes  Okay to leave a detailed message: Yes

## 2021-06-14 NOTE — PATIENT INSTRUCTIONS - HE
Pulmonary concerns: asthma    Plan:    Switch you Symbicort to daily Trelegy. Remember to rinse your mouth & throat after using it.    Use albuterol on an as-needed basis. Can use as frequently as every 4 hours if you need it.    Continue with Flonase spray, can increase to twice daily during the colder months if it is helpful.    Recommend using nasal rinses (neti pot, etc) about 1-2x/day during these colder & drier months.    Try taking Zyrtec once a day to help with congestion -- I suggest taking it for at least a month to see if it makes a significant difference for you.    You should follow up in 6 months.     If you have any questions or concerns, please, call our clinic at [phone].     It is very important to use good technique when using inhalers. To review correct inhaler technique, consider using [employer] to look for demonstrations. You should exercise common sense when looking for videos -- best and most informative inhaler videos come from health care organizations and/or healthcare providers. Make sure you watch the video for you specific type of inhaler.

## 2021-06-14 NOTE — PROGRESS NOTES
Pulmonary Clinic Visit -- VIDEO  1/5/2021     Katt Dsouza is a 80 year old former smoker with the most pertinent history of obesity, diastolic heart failure, and recently diagnosed asthma, presenting for follow up of asthma. She was last seen on 10/15/20 for discussion of the same -- switched from Trelegy (not as effective) to Symbicort + Spiriva w/ recommendations to continue Flonase two times a day, nasal rinses, & consider a trial of an antihistamine.     Reports that she is having some breathing issues. She has been taking Symbicort two times a day but has not been taking Spiriva -- sounds like there was a misunderstanding. She would like to switch back to Trelegy. She is using her albuterol about twice a week on the average, & some days she had to use it up to twice a day. Cold weather has been a bit of problem. She is having a bit of a problem w/ her sinuses -- recently saw an ENT & was sent for a TMJ evaluation. She is using Flonase & her nasal rinses. Has not tried Zyrtec yet -- will try it.    REVIEW OF SYSTEMS: 10-point ROS was negative with exceptions as detailed above.  MEDICAL HISTORY: T2DM, anxiety, diverticulosis, HTN, GERD  SURGICAL HISTORY: cholecystectomy, appendectomy, carpal tunnel surgery, lumbar spine foraminectomies, laminotomy & decompression  SOCIAL HISTORY:  reports that she quit smoking about 26 years ago. She has never used smokeless tobacco.  FAMILY HISTORY: family history is not on file.  MEDICATIONS: personally reviewed, including EMR/Care Everywhere. Pertinent information noted & updated.     ALLERGIES:   Allergies   Allergen Reactions     Penicillins Hives       Labs: personally reviewed & interpreted in EMR.   Imaging & Procedures: personally reviewed & interpreted, including formal Radiology reports in the EMR.    Limited physical examination:  General: no acute distress  HEENT: MMM, normal voice, EOMI  Resp: no accessory muscle use; able to talk in full sentences; no audible  wheezing  Neuro: AOx3  Psych: calm    Assessment/Plan:  #. Asthma, moderate persistent.  #. Rhinitis, recurrent sinus congestion.       Switch to Trelegy daily    Albuterol as needed    Flonase two times a day     Nasal rinses 1-2x/day    Zyrtec daily -- a month-long trial to see if it helps    RTC in 6 months    Melvina Sommers MD  Pulmonary and Critical Care       Video-Visit Details  Type of service:  Video Visit  Video Start Time: 8:05  Video End Time (time video stopped): 8:16 AM  Originating Location (pt. Location): Home  Distant Location (provider location):  [facility]   Platform used for Video Visit: Sharron

## 2021-06-14 NOTE — PROGRESS NOTES
Katt Dsouza is a 80 y.o. female who is being evaluated via a billable video visit.      How would you like to obtain your AVS? Mail a copy.  If dropped from the video visit, the video invitation should be resent by: Text to cell phone: [phone]  Will anyone else be joining your video visit? No

## 2021-06-16 PROBLEM — E66.01 MORBID OBESITY (H): Status: ACTIVE | Noted: 2021-03-24

## 2021-06-16 PROBLEM — R60.0 BILATERAL LEG EDEMA: Status: ACTIVE | Noted: 2021-03-24

## 2021-06-16 PROBLEM — J45.990 EXERCISE-INDUCED ASTHMA: Status: ACTIVE | Noted: 2021-03-24

## 2021-06-16 PROBLEM — I35.0 AORTIC STENOSIS: Status: ACTIVE | Noted: 2021-03-24

## 2021-06-16 PROBLEM — R06.09 DOE (DYSPNEA ON EXERTION): Status: ACTIVE | Noted: 2021-03-24

## 2021-06-16 NOTE — PATIENT INSTRUCTIONS - HE
We are going to plan a heart ultrasound and blood work.Please call my nurse or write on my chart with an update after you have been taking the Furosemide for a week or so.Try taking 1/2 tablet in the morning or 10mg and lying down for a half hour or so before you need to urinate.Please weigh yourself each morning and call with an update.My nurse is Farhana and her number is [phone]

## 2021-06-16 NOTE — TELEPHONE ENCOUNTER
New Appointment Needed  What is the reason for the visit:    Patient called after hours at 10pm on 3/31. Please cancel her appt. on 4/1 with heart care. She cannot make it due to being called in for a .  Thank you    Provider Preference: Dr. Francisco/Riley Gonsalez   How soon do you need to be seen?: Patient said she will call to reschedule with heart care   Waitlist offered?: N/A  Okay to leave a detailed message:  N/A

## 2021-06-16 NOTE — TELEPHONE ENCOUNTER
Telephone Encounter by Norma Sinclari at 2/17/2020  1:42 PM     Author: Norma Sinclair Service: -- Author Type: --    Filed: 2/17/2020  1:44 PM Encounter Date: 2/13/2020 Status: Addendum    : Norma Sinclair    Related Notes: Original Note by Norma Sinclair filed at 2/17/2020  1:43 PM       No PA needed, medication is covered by plan and per pharmacy they have a paid claim.  It was just a refill too soon.  copay will be $62.08

## 2021-06-17 NOTE — PROGRESS NOTES
----- Message -----  From: Reymundo Francisco MD  Sent: 4/25/2021   7:10 PM CDT  To: Farhana Bob RN    Yes, thank you lianna    ----- Message -----  From: Farhana Bob RN  Sent: 4/19/2021   3:53 PM CDT  To: Reymundo Francisco MD    Your result note from the 3/24 lab work said to follow-up with NP in 7-10 days with SHAHANA at that appt.  She was scheduled 4/1/21 with Riley but cancelled because she got called in to be a  and she did not call back to reschedule.  I will call her to follow-up, but you want for sure BMP, BNP, and Mag?  -rah        === BMP, BNP, Mag ordered.  -ra

## 2021-06-17 NOTE — PATIENT INSTRUCTIONS - HE
Please call with any heart related questions.We talked about retrying the Furosemide but taking when its more convenient and letting me know if its still contributing to nausea.My nurse is Farhana and her number is [phone]

## 2021-06-17 NOTE — TELEPHONE ENCOUNTER
Telephone Encounter by Norma Sinclair at 10/16/2020  4:30 PM     Author: Norma Sinclair Service: -- Author Type: --    Filed: 10/16/2020  4:30 PM Encounter Date: 10/16/2020 Status: Signed    : Norma Sinclair APPROVED:    Approval start date: 7/18/2020  Approval end date:  10/16/2021    Pharmacy has been notified of approval and will contact patient when medication is ready for pickup.

## 2021-06-19 NOTE — LETTER
Letter by Melvina Sommers MD at      Author: Melvina Sommers MD Service: -- Author Type: --    Filed:  Encounter Date: 6/12/2019 Status: (Other)         FAITH Castillo  [address]  [facility] [address]                                  June 12, 2019    Patient: Katt Dsouza   MR Number: [id number]   YOB: 1940   Date of Visit: 6/12/2019     Dear FAITH Aguilar:    Thank you for referring Katt Dsouza to me for evaluation. Below are the relevant portions of my assessment and plan of care.    If you have questions, please do not hesitate to call me. I look forward to following Katt along with you.    Sincerely,        Melvina Sommers MD          CC  No Recipients  Melvina Sommers MD  6/12/2019 12:18 PM  Sign at close encounter  Pulmonary Clinic Outpatient Consultation  6/12/2019     Assessment and Plan:   78 year old former smoker with the most pertinent medical history of hypertension, GERD, obesity, and peripheral edema, presenting for evaluation of exertional dyspnea.  Her testing was able to rule out COPD, but she still has clinical symptoms concerning for asthma.  In addition she has clinical symptoms and PFT findings suggestive of diastolic heart failure with pulmonary edema.    #.  Asthma, mild to moderate persistent (likely), no acute exacerbation at this time.  #.  Decompensated diastolic heart failure.      Start Breo ellipta inhaler (I would like to eliminate confounding respiratory symptoms due to asthma so patient can receive adequate treatment for her diastolic heart failure)    Continue PRN albuterol    Continue with Cardiology work-up; has an appointment scheduled w/ Dr. Francisco in July    Will need ongoing diuresis, likely increased dose to help achieved dry weight (undetermined at this time)    Pt should return to clinic in 3 months for a follow up.    I appreciate the opportunity to participate in the care of Katt Dsouza.   Please, feel free to contact me at any time.    Melvina Sommers MD  Pulmonary and Critical Care   ______________________________________________________________________________    CC: Exertional dyspnea    HPI:   Katt Dsouza is a 78 y.o. former smoker with history of anxiety, obesity, GERD, HTN, T2DM, presenting today for evaluation of exertional dyspnea.  Her dyspnea started approximately 6 months ago.  Is associated with chest tightness, but no chest pain no wheezing and no cough.  It is particularly notable on inclines.  Approximately 2 months ago she was started on a PRN albuterol inhaler and finds it quite helpful.  Several weeks ago she was also started on furosemide since her exertional dyspnea was also associated with increasing swelling in her aches.  Furosemide has been helpful in improving her shortness of breath, and patient states that she definitely notices when she misses her Lasix dose.  Additionally she admits to chronic sinus congestion, for which she uses daily Flonase.    Denies any issues with sleep, states that she sleeps soundly without any awakenings, no prior reports of snoring, no prior reports of apneic episodes.  She wakes up rested.  Denies any daytime somnolence.    She is a former smoker with approximately 10-pack-year history and significant exposure to secondhand smoke.  She quit smoking in the early 90s.  She denies any childhood history of asthma.    ROS:  Review of Systems - 10 point ROS reviewed and noted to be negative w/ exceptions as detailed in the HPI.    PMH:    Type 2 diabetes mellitus    Anxiety    Diverticulosis    Hypertension    GERD    PSH:    Cholecystectomy 1991    Appendectomy 1961    Left carpal tunnel surgery 2006    Left L4-L5, left L5-S1 far lateral foraminotomies, partial laminotomy, partial facetectomy, and decompression in 2015    L4-L5 central and left paracentral spinal decompression, 2015    Allergies:  Allergies   Allergen Reactions   ? Penicillins Hives      Family HX: Notable for CHF, diabetes mellitus    Social Hx:  Social History     Socioeconomic History   ? Marital status:      Spouse name: Not on file   ? Number of children: Not on file   ? Years of education: Not on file   ? Highest education level: Not on file   Occupational History   ? Not on file   Social Needs   ? Financial resource strain: Not on file   ? Food insecurity:     Worry: Not on file     Inability: Not on file   ? Transportation needs:     Medical: Not on file     Non-medical: Not on file   Tobacco Use   ? Smoking status: Former Smoker     Last attempt to quit: 1995     Years since quittin.4   ? Smokeless tobacco: Never Used   Substance and Sexual Activity   ? Alcohol use: Not on file   ? Drug use: Not on file   ? Sexual activity: Not on file   Lifestyle   ? Physical activity:     Days per week: Not on file     Minutes per session: Not on file   ? Stress: Not on file   Relationships   ? Social connections:     Talks on phone: Not on file     Gets together: Not on file     Attends Restorationism service: Not on file     Active member of club or organization: Not on file     Attends meetings of clubs or organizations: Not on file     Relationship status: Not on file   ? Intimate partner violence:     Fear of current or ex partner: Not on file     Emotionally abused: Not on file     Physically abused: Not on file     Forced sexual activity: Not on file   Other Topics Concern   ? Not on file   Social History Narrative   ? Not on file       Current Meds:  Medication Sig   ? albuterol (PROAIR HFA;PROVENTIL HFA;VENTOLIN HFA) 90 mcg/actuation inhaler INHALE 2 PUFFS BY INHALATION ROUTE BEFORE EXERCISE AND EVERY 4 HOURS IF NEEDED   ? amLODIPine (NORVASC) 5 MG tablet Take 5 mg by mouth daily.   ? aspirin 81 MG EC tablet Take 81 mg by mouth daily.   ? atorvastatin (LIPITOR) 20 MG tablet Take 20 mg by mouth at bedtime.   ? fluticasone (FLONASE) 50 mcg/actuation nasal spray Apply 1 spray into  "each nostril daily.   ? multivitamin (ONE A DAY) per tablet Take 1 tablet by mouth daily.   ? ranitidine (ZANTAC) 150 MG tablet Take 150 mg by mouth 2 (two) times a day.   ? zolpidem (AMBIEN CR) 12.5 MG CR tablet Take 12.5 mg by mouth at bedtime as needed for sleep.   ? fluticasone furoate-vilanterol (BREO ELLIPTA) 200-25 mcg/dose DsDv inhaler Inhale 1 puff daily.   ? furosemide (LASIX) 20 MG tablet Take 20 mg by mouth daily.   ? irbesartan-hydrochlorothiazide (AVALIDE) 150-12.5 mg per tablet Take 1 tablet by mouth 2 (two) times a day.   ? metFORMIN (GLUCOPHAGE) 500 MG tablet Take 500 mg by mouth 2 (two) times a day with meals.     Physical Exam:  /64   Pulse 68   Resp 24   Ht 5' 5\" (1.651 m)   Wt 211 lb 12.8 oz (96.1 kg)   SpO2 94% Comment: RA  BMI 35.25 kg/m     Gen: well-dressed woman, alert, oriented, no distress  HEENT: nasal turbinates are unremarkable, no oropharyngeal lesions, no cervical or supraclavicular lymphadenopathy; no stridor  CV: RRR, no M/G/R  Resp: equal bilateral air entry, breath sounds clear throughout, no focal crackles or wheezes; able to converse in full sentences w/ no respiratory distress  Abd: soft, nontender, no palpable organomegaly  Skin: no apparent rashes  Ext: no cyanosis, clubbing, 2+ pitting lower extremity edema, symmetric  Neuro: alert, nonfocal    Labs: personally reviewed in the EMR.    Imaging studies: personally reviewed.  No recent pertinent chest radiographic studies available.    #. 12/2018: Pharmacologic nuclear stress test is negative for inducible myocardial ischemia or infarction.  Left ventricular ejection fraction is 69%.    PFT's (6/12/2019): Difficult to test due to patient issues with performing the needed maneuvers.  No evidence of COPD.  Negative bronchodilator response.  Unable to assess lung volumes.  Mildly reduced diffusion capacity.  FEV1/FVC is 0.68 and is normal. FEV1 is 1.45 L (69 % predicted) and is reduced. FVC is 2.14 L (78 % predicted) " and reduced. There was no improvement in spirometry after a single inhaled dose of bronchodilator. TLC could not be accurately tested. DLCO is 62 % predicted and is reduced when it is corrected for hemoglobin.

## 2021-06-19 NOTE — LETTER
Letter by Melvina Sommers MD at      Author: Melvina Sommers MD Service: -- Author Type: --    Filed:  Encounter Date: 4/19/2019 Status: (Other)         Katt Dsouza  [address]  [facility] [address]    April 19, 2019    Dear Ms. Dsouza,    Welcome to [facility]! Your appointment information is below.   Please bring the following to your appointment:    Insurance Card, so we may scan it for our records    Drivers license or valid ID, so we may scan it for our records    Co-pay (as applicable per your insurance plan)    A current list of your medications including over the counter products such as vitamins and supplements    Your medical records including copies of X-Ray films if you are transferring your care from another clinic.  If you do not have your records, please fill out the release of information form and we will request those records.     Provider: Melvina Sommers MD  Appointment Date:  Wednesday, June 12, 2019  Arrival Time:   10:00 PFT, 11:00 dr arroyo.    Location: [address]        [facility], [address]    **Please allow adequate time for your commute and parking. If you are more than 10 minutes late, you may be asked to reschedule.     If you need to cancel or reschedule your appointment, please notify us at least 24 hours prior to your appointment time so we are able to make this time available for another patient.    Thank you for choosing the [facility] for your health care needs. If you have any questions, please do not hesitate to contact us at any time at   [phone]. We look forward to caring for you.     Sincerely,     [facility] [place] staff

## 2021-06-27 NOTE — PROGRESS NOTES
Progress Notes by Reymundo Francisco MD at 8/22/2019  8:10 AM     Author: Reymundo Francisco MD Service: -- Author Type: Physician    Filed: 8/22/2019  8:42 AM Encounter Date: 8/22/2019 Status: Signed    : Reymundo Francisco MD (Physician)       [facility] Progress Note    Assessment:  1.  Dyspnea.  Symptoms are improved.  I suspect multifactorial related to underlying asthma and she does indicate that inhalers seem to be beneficial.  In addition suspect some contribution from heart failure with preserved ejection fraction with increased filling pressures on echocardiography.  He is going to remain on furosemide 20 mg daily.  She has not had follow-up laboratory studies since initiating furosemide by her primary care physician which we will plan today.  She had a normal BNP.  We discussed weighing herself daily and continuing with regular exercise.  We also discussed weight loss strategies.      Plan: Chemistry profile today and follow-up in 4 months.    1. Chronic diastolic congestive heart failure (H)  Basic metabolic panel    Magnesium         An After Visit Summary was printed and given to the patient.    Subjective:    Katt Dsouza is a 79 y.o. female who returned for a planned  follow up visit.  We met in consultation July 5, 2019.  She has been seen by pulmonary with recommendations to be evaluated for heart failure with preserved ejection fraction as well as being treated for asthma.  She tells me she continues to feel 50 to 60% improved.  She also notes that the inhalers seem to be helpful as well as low-dose furosemide.  She did have some increased edema when she tried to stop the furosemide but on the current dose she states edema has been stable.  She has no orthopnea or PND.  She has not been weighing herself regularly and we talked about daily weights and watching the salt in her diet.  We also discussed the benefit of weight loss.    She underwent echocardiography in July 2019 that  demonstrated normal left ventricular function.  There was suggestion of mild aortic stenosis.  There was evidence of elevated filling pressures on the echocardiogram.  She had a CT angiogram that revealed a calcium score of 0 and no obstructive coronary artery disease.  She did have mitral annular calcification as well as calcification within the aorta.    Review of Systems:   General: WNL  Eyes: WNL  Ears/Nose/Throat: WNL  Lungs: Shortness of Breath  Heart: Shortness of Breath with activity  Stomach: WNL  Bladder: WNL  Muscle/Joints: WNL  Skin: WNL  Nervous System: WNL  Mental Health: WNL     Blood: WNL      Problem List:    There is no problem list on file for this patient.      Social History     Socioeconomic History   ? Marital status:      Spouse name: Not on file   ? Number of children: Not on file   ? Years of education: Not on file   ? Highest education level: Not on file   Occupational History   ? Not on file   Social Needs   ? Financial resource strain: Not on file   ? Food insecurity:     Worry: Not on file     Inability: Not on file   ? Transportation needs:     Medical: Not on file     Non-medical: Not on file   Tobacco Use   ? Smoking status: Former Smoker     Last attempt to quit: 1995     Years since quittin.6   ? Smokeless tobacco: Never Used   Substance and Sexual Activity   ? Alcohol use: Not on file   ? Drug use: Not on file   ? Sexual activity: Not on file   Lifestyle   ? Physical activity:     Days per week: Not on file     Minutes per session: Not on file   ? Stress: Not on file   Relationships   ? Social connections:     Talks on phone: Not on file     Gets together: Not on file     Attends [employer] service: Not on file     Active member of club or organization: Not on file     Attends meetings of clubs or organizations: Not on file     Relationship status: Not on file   ? Intimate partner violence:     Fear of current or ex partner: Not on file     Emotionally abused: Not  "on file     Physically abused: Not on file     Forced sexual activity: Not on file   Other Topics Concern   ? Not on file   Social History Narrative   ? Not on file       No family history on file.    Current Outpatient Medications   Medication Sig Dispense Refill   ? acetaminophen-codeine (TYLENOL #3) 300-30 mg per tablet Take 1 tablet by mouth at bedtime as needed.  0   ? albuterol (PROAIR HFA;PROVENTIL HFA;VENTOLIN HFA) 90 mcg/actuation inhaler INHALE 2 PUFFS BY INHALATION ROUTE BEFORE EXERCISE AND EVERY 4 HOURS IF NEEDED  1   ? amLODIPine (NORVASC) 5 MG tablet Take 5 mg by mouth daily.     ? aspirin 81 MG EC tablet Take 81 mg by mouth daily.     ? atorvastatin (LIPITOR) 20 MG tablet Take 20 mg by mouth at bedtime.     ? fluticasone (FLONASE) 50 mcg/actuation nasal spray Apply 1 spray into each nostril daily.     ? fluticasone furoate-vilanterol (BREO ELLIPTA) 200-25 mcg/dose DsDv inhaler Inhale 1 puff daily. 60 each 5   ? furosemide (LASIX) 20 MG tablet Take 20 mg by mouth daily.  0   ? losartan-hydrochlorothiazide (HYZAAR) 100-12.5 mg per tablet Take 1 tablet by mouth daily.  0   ? metFORMIN (GLUCOPHAGE) 500 MG tablet Take 500 mg by mouth 2 (two) times a day with meals.     ? multivitamin (ONE A DAY) per tablet Take 1 tablet by mouth daily.     ? ranitidine (ZANTAC) 150 MG tablet Take 150 mg by mouth 2 (two) times a day.     ? zolpidem (AMBIEN) 10 mg tablet Take 1 tablet by mouth as needed.  1   ? irbesartan-hydrochlorothiazide (AVALIDE) 150-12.5 mg per tablet Take 1 tablet by mouth 2 (two) times a day.     ? zolpidem (AMBIEN CR) 12.5 MG CR tablet Take 10 mg by mouth at bedtime as needed for sleep.              No current facility-administered medications for this visit.        Objective:     /62 (Patient Site: Right Arm, Patient Position: Sitting, Cuff Size: Adult Large)   Pulse 64   Resp 20   Ht 5' 4.5\" (1.638 m)   Wt 214 lb (97.1 kg)   BMI 36.17 kg/m    214 lb (97.1 kg)   214 pounds July 5, "     Physical Exam:    GENERAL APPEARANCE: alert, no apparent distress  HEENT: no scleral icterus or xanthelasma  NECK: jugular venous pressure not obviously increased.  CHEST: symmetric, the lungs are clear to auscultation  CARDIOVASCULAR: regular rhythm with soft systolic murmur; no carotid bruits  Abdomen: No Organomegaly, masses, bruits, or tenderness. Bowels sounds are present      EXTREMITIES: no cyanosis, clubbing or significant edema    Cardiac Testing:  Echo Complete   Order# [id number]   Reading physician: Bridgette Stoddard MD Ordering physician: Reymundo Francisco MD Study date: 19   Performing Date Performing Department   2019  CARDIAC TESTING [[id number]]   Patient Information     Patient Name  Katt Dsouza MRN  [id number] Sex  Female              Age  1940 (79 y.o.)   Indications     Dx: Chronic diastolic congestive heart failure (H) [I50.32 (ICD-10-CM)]   Summary       1.Left ventricle ejection fraction is normal. The calculated left ventricular ejection fraction is 72%.    2.TAPSE is normal, which is consistent with normal right ventricular systolic function.    3.Aortic valve not well visualized, however probably mild aortic stenosis with mild aortic insufficiency. Cannot rule out some contribution to gradient from mid ventricular gradient.    4.No previous study for comparison.        NM Pharmacologic Stress Test   Order# [id number]   Reading physician: David Zamora MD Ordering physician: System, Provider Not In Study date: 19   Patient Information     Patient Name  Katt Dsouza MRN  [id number] Sex  Female              Age  1940 (79 y.o.)   EKG Scan       Scan on: 19 9:00 AM by:    Indications     SOB (shortness of breath)   Conclusion       The pharmacologic nuclear stress test is negative for inducible myocardial ischemia or infarction.    The left ventricular ejection fraction is 69%.    A brief 3 second run of atrial fibrillation was identified in  recovery.    There is no prior study available.           CTA Coronary W Calcium Score   Order# [id number]   Reading physician: Reymundo Francisco MD Ordering physician: Reymundo Francisco MD Study date: 19   Patient Information     Patient Name  Katt Dsouza MRN  [id number] Sex  Female              Age  1940 (79 y.o.)   Indications     Chest pain, normal EKG   Dx: Chest discomfort [R07.89 (ICD-10-CM)]   Interpretation Summary       The total Agatston calcium score is 0. A calcium score of zero places the individual in the lowest quartile when compared to an age and gender matched control group and implies a low risk of cardiac events in the next 10 years. (less than 1% per year).    No significant obstructive plaque or stenosis within the visualized portions of the coronary tree.    Mitral annular calcification. Moderate calcification of the ascending and descending thoracic aorta. In the descending thoracic aorta there is soft and hardened plaque.    Please see separate report per radiology for additional findings     CT Chest Over Read (Order [id number])   Imaging   Date: 2019 Department: [facility] CT Released By: Carolyn Borges [facility] Authorizing: Reymundo Francisco MD   Study Result     OVERREAD: DETAILED [facility] EXTRACARDIAC OVERREAD OF CARDIAC CT   DATE/TIME: 2019 10:28 AM     INDICATION: Chest pain, normal EKG  TECHNIQUE: Dose reduction techniques were used.  CONTRAST: Iohexol (Omni) 75mL  COMPARISON: None.     FINDINGS:    LIMITED CHEST: Negative.  LIMITED MEDIASTINUM: Descending aortic atherosclerosis.  LIMITED UPPER ABDOMEN: Negative.     IMPRESSION:   CONCLUSION:    1.  Aortic atherosclerosis.  2.  Please refer to cardiologist's dictation for the cardiac CT report.   MUSE DIAGNOSIS   ECG Clinic - Today   Collected: 19 1011   Resulting lab: HE MUSE   Value: Sinus rhythm with Premature atrial complexes   Left axis deviation   Abnormal ECG   When compared with ECG  of 27-AUG-2013 21:35,   Premature atrial complexes are now Present   Confirmed by KARYNA KINNEY, LISSY LOC:JN ([id number]) on 7/10/2019 2:58:39 PM             Lab Results:    Lab Results   Component Value Date     05/25/2019    K 4.1 05/25/2019     05/25/2019    CO2 24 05/25/2019    BUN 22 05/25/2019    CREATININE 1.00 05/25/2019    CALCIUM 9.8 05/25/2019     Lab Results   Component Value Date    CHOL 144 07/25/2018    TRIG 159 (H) 07/25/2018    HDL 36 (L) 07/25/2018     BNP (pg/mL)   Date Value   07/05/2019 40     Creatinine (mg/dL)   Date Value   05/25/2019 1.00   02/19/2019 1.21 (H)   07/25/2018 0.93   05/24/2017 1.06     LDL Calculated (mg/dL)   Date Value   07/25/2018 76   05/24/2017 68   12/13/2016 76     Lab Results   Component Value Date    WBC 7.4 08/29/2013    HGB 11.1 (L) 08/29/2013    HCT 33.5 (L) 08/29/2013    MCV 95 08/29/2013     08/29/2013               This note has been dictated using voice recognition software. Any grammatical or context distortions are unintentional and inherent to the software.      Reymundo Francisco M.D., F.A.C.C.  [facility]  [phone]

## 2021-06-27 NOTE — PROGRESS NOTES
Progress Notes by Reymundo Francisco MD at 7/5/2019  9:10 AM     Author: Reymundo Francisco MD Service: -- Author Type: Physician    Filed: 7/5/2019 10:27 AM Encounter Date: 7/5/2019 Status: Signed    : Reymundo Francisco MD (Physician)       [facility] Office Consult     Assessment:   1.  Shortness of breath.  Repeat work-up thus far demonstrated no obvious ischemia on the nuclear stress test and negative work-up for COPD.  I suspect that she does have a component of diastolic heart failure.  We talked about the importance of prudent weight restriction and salt restriction.  I have recommended that we pursue an echocardiogram as well as a BNP.  In addition she does describe exertional chest discomfort as well as exertional shortness of breath.  Given multiple risk factors for coronary artery disease I have recommended CT coronary angiogram to further define.  I described coronary CT angiography in detail including contrast and x-ray exposure noninvasive nature of the examination.  After discussion she wishes to pursue.  We also discussed weight loss and she would like to be seen in the bariatric clinic which I think is also a good idea.  She does not give significant symptoms consistent with sleep apnea.  1. Chronic diastolic congestive heart failure (H)  BNP(B-type Natriuretic Peptide)    Echo Complete   2. Chest discomfort  CTA Coronary W Calcium Score   3. Obesity  Ambulatory referral to Bariatric Care: Surgical and Non-Surgical      Plan:   As outlined above up in 6 weeks        History of Present Illness:   Thank you for asking the [facility] team to see Katt Dsouza a 78 y.o.  female  in consultation  to evaluate shortness of breath..  She reports history of shortness of breath over the past 6 to 12 months.  She notes some shortness of breath with activity and had noted some lower extremity edema.  She recently was seen by  pulmonary and COPD was ruled out but she did have some  findings perhaps suggestive of asthma.  She was started on furosemide and has had at least a 60% improvement and is been trying to be more regular with respect to exercise.  The question as to whether she had underlying diastolic dysfunction and dyspnea on this basis.  Did have a nuclear stress test in 2019 that was negative for ischemia.    She reports that she will become dyspneic with activity but currently is able to walk up 2 flights of stairs at her home without difficulty.  She does not describe orthopnea.  Lower extremity edema has been improved.  She does report some periodic chest discomfort that can occur a few times per month or with increased activity.  She describes it as a tightness estimated 5 on a scale of 1-10 it can last a number of hours when it occurs.  She also indicates that her breathing has been somewhat improved with the initiation of the Brio inhaler.    Cardiac risk factors include quitting tobacco in , mild diabetes mellitus, positive for hypertension, positive for hyperlipidemia on atorvastatin.  Father with a history of congestive heart failure at age 72.  Mother  at age 80 of uncertain cause.  Sister and brother are  from noncardiac issues.    Past Medical History:   Hypertension  Hyperlipidemia  Diabetes type 2009 diagnosis  History of diverticulitis  Adjustment disorder    Past Surgical History:   Cholecystectomy   Appendectomy   Left carpal tunnel surgery   Laminectomy 2015  Paracentral spinal [id number]    Family History:   As outlined above    Social History:    reports that she quit smoking about 24 years ago. She has never used smokeless tobacco.  The primary care physician is Aniyah Holguin FNP  Meds:   Scheduled Meds:  Current Outpatient Medications   Medication Sig Dispense Refill   ? albuterol (PROAIR HFA;PROVENTIL HFA;VENTOLIN HFA) 90 mcg/actuation inhaler INHALE 2 PUFFS BY INHALATION ROUTE BEFORE EXERCISE AND EVERY 4  "HOURS IF NEEDED  1   ? amLODIPine (NORVASC) 5 MG tablet Take 5 mg by mouth daily.     ? aspirin 81 MG EC tablet Take 81 mg by mouth daily.     ? atorvastatin (LIPITOR) 20 MG tablet Take 20 mg by mouth at bedtime.     ? fluticasone (FLONASE) 50 mcg/actuation nasal spray Apply 1 spray into each nostril daily.     ? fluticasone furoate-vilanterol (BREO ELLIPTA) 200-25 mcg/dose DsDv inhaler Inhale 1 puff daily. 60 each 5   ? furosemide (LASIX) 20 MG tablet Take 20 mg by mouth daily.  0   ? irbesartan-hydrochlorothiazide (AVALIDE) 150-12.5 mg per tablet Take 1 tablet by mouth 2 (two) times a day.     ? metFORMIN (GLUCOPHAGE) 500 MG tablet Take 500 mg by mouth 2 (two) times a day with meals.     ? multivitamin (ONE A DAY) per tablet Take 1 tablet by mouth daily.     ? ranitidine (ZANTAC) 150 MG tablet Take 150 mg by mouth 2 (two) times a day.     ? zolpidem (AMBIEN CR) 12.5 MG CR tablet Take 10 mg by mouth at bedtime as needed for sleep.              No current facility-administered medications for this visit.        PRN Meds:.    Allergies:   Penicillins          Objective:      Physical Exam  214 lb (97.1 kg)  5' 4.57\" (1.64 m)  Body mass index is 36.09 kg/m .  /64 (Patient Site: Left Arm, Patient Position: Sitting, Cuff Size: Adult Regular)   Pulse 76   Resp 16   Ht 5' 4.57\" (1.64 m)   Wt 214 lb (97.1 kg)   BMI 36.09 kg/m      General Appearance:   Alert, cooperative and in no acute distress.   HEENT:  No scleral icterus; the mucous membranes were pink and moist.   Neck: JVP mildly challenging but not obviously increased. No thyromegaly. No HJR   Chest: The spine was straight. The chest was symmetric.   Lungs:   Respirations unlabored; the lungs are clear to auscultation.   Cardiovascular:   S1 and S2 with soft systolic murmur, S4. Brachial, radial, carotid and posterior tibial pulses are intact and symetrical.  No carotid bruits noted   Abdomen:  No organomegaly, masses, bruits, or tenderness. Bowels sounds " are present   Extremities: No cyanosis, clubbing, trace edema.   Skin: No xanthelasma.   Neurologic: Mood and affect are appropriate.             Lab Reviewed Personally by myself    Lab Results   Component Value Date     2019    K 4.1 2019     2019    CO2 24 2019    BUN 22 2019    CREATININE 1.00 2019    CALCIUM 9.8 2019     Lab Results   Component Value Date    CHOL 144 2018    TRIG 159 (H) 2018    HDL 36 (L) 2018     No results found for: BNP  Creatinine (mg/dL)   Date Value   2019 1.00   2019 1.21 (H)   2018 0.93   2017 1.06     LDL Calculated (mg/dL)   Date Value   2018 76   2017 68   2016 76     Lab Results   Component Value Date    WBC 7.4 2013    HGB 11.1 (L) 2013    HCT 33.5 (L) 2013    MCV 95 2013     2013       Cardiac Testing:  NM Pharmacologic Stress Test   Order# [id number]   Reading physician: David Zamora MD Ordering physician: System, Provider Not In Study date: 19   Patient Information     Patient Name  Katt Dsouza MRN  [id number] Sex  Female              Age  1940 (78 y.o.)   EKG Scan       Scan on: 19 9:00 AM by:    Indications     SOB (shortness of breath)   Conclusion       The pharmacologic nuclear stress test is negative for inducible myocardial ischemia or infarction.    The left ventricular ejection fraction is 69%.    A brief 3 second run of atrial fibrillation was identified in recovery.    There is no prior study available.              ECG personally reviewed by myself shows sinus rhythm, left axis deviation, occasional premature atrial complexes         Review of Systems:       Review of Systems:   General: WNL  Eyes: WNL  Ears/Nose/Throat: WNL  Lungs: Shortness of Breath  Heart: Shortness of Breath with activity, Leg Swelling  Stomach: WNL  Bladder: WNL  Muscle/Joints: Joint Pain  Skin: WNL  Nervous System:  WNL  Mental Health: WNL     Blood: WNL      This note has been dictated using voice recognition software. Any grammatical or context distortions are unintentional and inherent to the software.

## 2021-06-28 NOTE — PROGRESS NOTES
"Progress Notes by Reymundo Francisco MD at 4/8/2020  8:30 AM     Author: Reymundo Francisco MD Service: -- Author Type: Physician    Filed: 4/8/2020  9:04 AM Encounter Date: 4/8/2020 Status: Signed    : Reymundo Francisco MD (Physician)             The patient has been notified of following:     \"This telephone visit will be conducted via a call between you and your physician/provider. We have found that certain health care needs can be provided without the need for a physical exam.  This service lets us provide the care you need with a phone conversation.  If a prescription is necessary we can send it directly to your pharmacy.  If lab work is needed we can place an order for that and you can then stop by our lab to have the test done at a later time. If during the course of the call the physician/provider feels a telephone visit is not appropriate, you will not be charged for this service.\" Verbal consent has been obtained for this service by care team member:         HEART CARE PHONE ENCOUNTER        The patient has chosen to have the visit conducted as a telephone visit, to reduce risk of exposure given the current status of Coronavirus in our community. This telephone visit is being conducted via a call between the patient and physician/provider. Health care needs are being provided without a physical exam.     Assessment/Recommendations   Assessment:    1.  Dyspnea.  Symptoms are stable.  This primarily is related to underlying pulmonary issues and I reviewed notes from pulmonary consultant with moderate persistent asthma which has been well controlled on current combination of medications.  She reports no increase in volume overload, lower extremity edema and has not required PRN furosemide.  I did review most recent notes from her primary care provider as well February 2020.    2.  Hypertension.  She does report the blood pressures have been a little elevated at times.  I have asked her to monitor her blood " pressure by checking her blood pressure after being seated for 10 minutes and during the time of day and then reporting blood pressures back to us over the next few weeks with 6-10 blood pressure readings.  She will continue with the current combination of medications.  Plan:  1.  As outlined above with plan follow-up in 3 to 4 months.      Follow Up Plan: Follow up in   I have reviewed the note as documented.  This accurately captures the substance of my conversation with the patient.    Total time of call between patient and provider was 22 minutes   Start Time:828am   Stop Time:850am       History of Present Illness/Subjective    Katt Dsouza is a 79 y.o. female who is being evaluated via a billable telephone visit.  She reports to me today that overall she is been feeling well.  She notes some mild shortness of breath with exertion but states that this is stable.  She is not experiencing any chest pain, dizziness, orthopnea or PND.  She recently went from [place].  She does provide me with a few blood pressure readings which have been mildly elevated.  I did review some recent notes including a note from February 2020 from her primary care physician where blood pressure was reasonable at 126/70.  This morning's blood pressure per her report is 151/79.  She is going to get some additional blood pressure readings for me and report these back over the next few weeks and we can make additional decisions regarding her blood pressure readings.  We talked about sitting for 10 minutes before checking her blood pressure.  I reviewed her blood pressure medications in detail with her.    I have reviewed notes from pulmonary from August 2019.  She has a history of asthma with stable symptoms with current use of inhalers.  I have reviewed my notes from August 2019.  Dyspnea was felt to be primarily related to underlying pulmonary issues.  She had a BNP that was 40 and July 2019.  She had an echocardiogram that demonstrated  normal left ventricular function normal TAPSE, mild aortic stenosis and mild aortic insufficiency.  In addition she had a CT angiogram 2019 with a coronary calcium score of 0 and no obstructive coronary artery disease.  She did have mitral annular calcification and moderate calcification of the ascending and descending thoracic aorta.    Laboratory studies are reviewed her LDL cholesterol was 74, total cholesterol 1 2019.    TA Coronary W Calcium Score   Order# [id number]   Reading physician: Reymundo Francisco MD  Ordering physician: Reymundo Francisco MD  Study date: 19    Patient Information     Patient Name   Katt Dsouza  MRN   [id number]  Sex   Female   1   1940 (79 y.o.)    Indications     Chest pain, normal EKG    Dx: Chest discomfort [R07.89 (ICD-10-CM)]    Interpretation Summary     The total Agatston calcium score is 0. A calcium score of zero places the individual in the lowest quartile when compared to an age and gender matched control group and implies a low risk of cardiac events in the next 10 years. (less than 1% per year).  No significant obstructive plaque or stenosis within the visualized portions of the coronary tree.  Mitral annular calcification. Moderate calcification of the ascending and descending thoracic aorta. In the descending thoracic aorta there is soft and hardened plaque.  Please see separate report per radiology for additional findings      Echo Complete   Order# [id number]   Reading physician: Bridgette Stoddard MD  Ordering physician: Reymundo Francisco MD  Study date: 19    Performing Date  Performing Department    2019  [facility] CARDIAC TESTING [[id number]]    Patient Information     Patient Name   Katt Dsouza  MRN   [id number]  Sex   Female   1   1940 (78 y.o.)    Indications     Dx: Chronic diastolic congestive heart failure (H) [I50.32 (ICD-10-CM)]    Summary     1.Left ventricle ejection fraction is normal. The calculated left  ventricular ejection fraction is 72%.  2.TAPSE is normal, which is consistent with normal right ventricular systolic function.  3.Aortic valve not well visualized, however probably mild aortic stenosis with mild aortic insufficiency. Cannot rule out some contribution to gradient from mid ventricular gradient.  4.No previous study for comparison.             I have reviewed and updated the patient's Past Medical History, Social History, Family History and Medication List.     Physical Examination not performed given phone encounter Review of Systems                                                Medical History  Surgical History Family History Social History   No past medical history on file. No past surgical history on file. No family history on file. Social History     Socioeconomic History     Marital status:      Spouse name: Not on file     Number of children: Not on file     Years of education: Not on file     Highest education level: Not on file   Occupational History     Not on file   Social Needs     Financial resource strain: Not on file     Food insecurity     Worry: Not on file     Inability: Not on file     Transportation needs     Medical: Not on file     Non-medical: Not on file   Tobacco Use     Smoking status: Former Smoker     Last attempt to quit: 1995     Years since quittin.2     Smokeless tobacco: Never Used   Substance and Sexual Activity     Alcohol use: Not on file     Drug use: Not on file     Sexual activity: Not on file   Lifestyle     Physical activity     Days per week: Not on file     Minutes per session: Not on file     Stress: Not on file   Relationships     Social connections     Talks on phone: Not on file     Gets together: Not on file     Attends Buddhism service: Not on file     Active member of club or organization: Not on file     Attends meetings of clubs or organizations: Not on file     Relationship status: Not on file     Intimate partner violence     Fear  of current or ex partner: Not on file     Emotionally abused: Not on file     Physically abused: Not on file     Forced sexual activity: Not on file   Other Topics Concern     Not on file   Social History Narrative     Not on file          Medications  Allergies   Current Outpatient Medications   Medication Sig Dispense Refill     amLODIPine (NORVASC) 5 MG tablet Take 5 mg by mouth daily.       atorvastatin (LIPITOR) 20 MG tablet Take 20 mg by mouth at bedtime.       fluticasone (FLONASE) 50 mcg/actuation nasal spray Apply 1 spray into each nostril daily.       fluticasone furoate-vilanterol (BREO ELLIPTA) 200-25 mcg/dose DsDv inhaler Inhale 1 puff daily. 60 each 5     furosemide (LASIX) 20 MG tablet Take 20 mg by mouth daily.  0     losartan-hydrochlorothiazide (HYZAAR) 100-12.5 mg per tablet Take 1 tablet by mouth daily.  0     metFORMIN (GLUCOPHAGE) 500 MG tablet Take 500 mg by mouth 2 (two) times a day with meals.       multivitamin (ONE A DAY) per tablet Take 1 tablet by mouth daily.       ranitidine (ZANTAC) 150 MG tablet Take 150 mg by mouth 2 (two) times a day.       umeclidinium (INCRUSE ELLIPTA) 62.5 mcg/actuation DsDv inhaler Inhale 1 puff daily. 30 each 11     zolpidem (AMBIEN CR) 12.5 MG CR tablet Take 10 mg by mouth at bedtime as needed for sleep.              zolpidem (AMBIEN) 10 mg tablet Take 1 tablet by mouth as needed.  1     acetaminophen-codeine (TYLENOL #3) 300-30 mg per tablet Take 1 tablet by mouth at bedtime as needed.  0     albuterol (PROAIR HFA;PROVENTIL HFA;VENTOLIN HFA) 90 mcg/actuation inhaler INHALE 2 PUFFS BY INHALATION ROUTE BEFORE EXERCISE AND EVERY 4 HOURS IF NEEDED 1 Inhaler 1     No current facility-administered medications for this visit.     Allergies   Allergen Reactions     Penicillins Hives         Lab Results    Chemistry/lipid CBC Cardiac Enzymes/BNP/TSH/INR   Lab Results   Component Value Date    CHOL 147 10/14/2019    HDL 47 (L) 10/14/2019    LDLCALC 74 10/14/2019     TRIG 129 10/14/2019    CREATININE 1.14 (H) 10/14/2019    BUN 17 10/14/2019    K 3.7 10/14/2019     10/14/2019     10/14/2019    CO2 26 10/14/2019    Lab Results   Component Value Date    WBC 7.4 08/29/2013    HGB 11.1 (L) 08/29/2013    HCT 33.5 (L) 08/29/2013    MCV 95 08/29/2013     08/29/2013    Lab Results   Component Value Date    TROPONINI 0.05 08/27/2013    BNP 40 07/05/2019        Reymundo Francisco

## 2021-06-30 NOTE — PROGRESS NOTES
Progress Notes by Reymundo Francisco MD at 3/24/2021 11:30 AM     Author: Reymundo Francisco MD Service: -- Author Type: Physician    Filed: 4/18/2021  9:51 AM Encounter Date: 3/24/2021 Status: Addendum    : Reymundo Francisco MD (Physician)    Related Notes: Original Note by Reymundo Francisco MD (Physician) filed at 3/24/2021 12:28 PM             [facility] Progress Note    Assessment:  1.  Dyspnea on exertion.  She is followed by  in pulmonary clinic last seen January 2021.  Her inhaler was changed to Trelegy and noted some improvement but is now noting some increased shortness of breath.  She states that this is often seasonal and was no mold.  She also wonders whether there is a component is related to her heart.  In 2019 echocardiography demonstrated normal systolic function and reported mild aortic stenosis.  She had a BNP of 40 in July 2019.  She is not certain whether she snores and historically has not had daytime sleepiness although she states that since the Moderna vaccine she has had some daytime fatigue.  I have recommended that we repeat a bnp in the form of a pro BNP as well as chemistries.  She is going to resume the furosemide which she self discontinued in June 2020 at 10 mg 1/2 tablet and monitor closely for worsening symptoms of shortness of breath and fluid retention.  I did ask her to weigh herself daily.  We will have her follow-up in 1 week with the nurse practitioner.  I also asked her to seek more immediate attention if symptoms are worsening or progressive I did ask her to touch base with pulmonary.    2.  Risk factor modification.  She had a coronary calcium score of 0 and no obstructive coronary artery disease August 2019.  Cholesterol numbers recently revealed a total cholesterol 140 with an LDL of 51.  She is on 20 mg of atorvastatin.    3.  Aortic stenosis felt to be mild on echocardiography July 2019.  For follow-up echocardiogram.    Plan:  1.   Echocardiogram  2.  Laboratory studies  3.  Resume furosemide at 10 mg daily monitoring weights daily  4.  She is asked to update us with how she is doing with the initiation of furosemide and to seek more immediate attention if symptoms are worsening or progressive.  5.  She is asked to be in touch with her pulmonary physician for further guidance regarding increased dyspnea as it relates to her pulmonary condition.  6.  Question if sleep apnea should be evaluated.  Ask both her primary care provider and pulmonary physician to further determine.  1. Dyspnea  Echo Complete    Basic metabolic panel    Magnesium    N-Terminal PRO BNP Outpatient (NTBNP)    furosemide (LASIX) 20 MG tablet   2. Dyspnea on exertion     3. Exercise-induced asthma     4. Bilateral leg edema     5. Nonrheumatic aortic valve stenosis           An After Visit Summary was printed and given to the patient.    Subjective:    Katt Dsouza is a 80 y.o. female who returned for a planned  follow up visit. She has a history of asthma and most recently was seen by Dr Matthieu Olmos.  She states that with inhaler changes she initially had some improvement in her symptoms but more recently she has been more short of breath.  She describes exertional shortness of breath at one half block to 1 block and walking 1 flight of stairs..  I have reviewed my notes from August 2019.  Dyspnea was felt to be primarily related to underlying pulmonary issues.  She had a BNP that was 40 inJuly 2019.  She had an echocardiogram that demonstrated normal left ventricular function normal TAPSE, mild aortic stenosis and mild aortic insufficiency.  In addition she had a CT angiogram August 2019 with a coronary calcium score of 0 and no obstructive coronary artery disease.  She did have mitral annular calcification and moderate calcification of the ascending and descending thoracic aorta.    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  She believes that she has put on some weight and has had some mild lower extremity edema.  She does not describe clear-cut orthopnea but does state that she elevates the head of her bed chronically.  She denies chest discomfort or dizziness.  She lives alone and is not certain whether she snores.    Patient had a CT of her abdomen and pelvis for hematuria 2019 with nonobstructive nephrolithiasis was identified.  Hepatic steatosis.       Review of Systems:   General: WNL  Eyes: WNL  Ears/Nose/Throat: WNL  Lungs: Shortness of Breath, Wheezing  Heart: Shortness of Breath with activity, Leg Swelling  Stomach: WNL  Bladder: WNL  Muscle/Joints: Joint Pain, Muscle Weakness  Skin: WNL  Nervous System: Daytime Sleepiness  Mental Health: WNL     Blood: WNL      Problem List:    Patient Active Problem List   Diagnosis   ? Dyspnea   ? Exercise-induced asthma   ? Bilateral leg edema   ? Aortic stenosis       Social History     Socioeconomic History   ? Marital status:      Spouse name: Not on file   ? Number of children: Not on file   ? Years of education: Not on file   ? Highest education level: Not on file   Occupational History   ? Not on file   Social Needs   ? Financial resource strain: Not on file   ? Food insecurity     Worry: Not on file     Inability: Not on file   ? Transportation needs     Medical: Not on file     Non-medical: Not on file   Tobacco Use   ? Smoking status: Former Smoker     Quit date: 1995     Years since quittin.2   ? Smokeless tobacco: Never Used   Substance and Sexual Activity   ? Alcohol use: Not on file   ? Drug use: Not on file   ? Sexual activity: Not on file   Lifestyle   ? Physical activity     Days per week: Not on file     Minutes per session: Not on file   ? Stress: Not on file   Relationships   ? Social connections     Talks on phone: Not on file     Gets together: Not on file     Attends Adventist service: Not on file      Active member of club or organization: Not on file     Attends meetings of clubs or organizations: Not on file     Relationship status: Not on file   ? Intimate partner violence     Fear of current or ex partner: Not on file     Emotionally abused: Not on file     Physically abused: Not on file     Forced sexual activity: Not on file   Other Topics Concern   ? Not on file   Social History Narrative   ? Not on file       No family history on file.    Current Outpatient Medications   Medication Sig Dispense Refill   ? acetaminophen-codeine (TYLENOL #3) 300-30 mg per tablet Take 1 tablet by mouth at bedtime as needed.  0   ? albuterol (PROAIR HFA;PROVENTIL HFA;VENTOLIN HFA) 90 mcg/actuation inhaler Inhale 2 puffs every 4 (four) hours as needed for wheezing or shortness of breath. INHALE 2 PUFFS BY INHALATION ROUTE BEFORE EXERCISE AND EVERY 4 HOURS IF NEEDED 1 Inhaler 1   ? allopurinoL (ZYLOPRIM) 100 MG tablet Take 100 mg by mouth daily.     ? amLODIPine (NORVASC) 5 MG tablet Take 5 mg by mouth daily.     ? atorvastatin (LIPITOR) 20 MG tablet Take 20 mg by mouth at bedtime.     ? azelastine (ASTELIN) 137 mcg (0.1 %) nasal spray Apply 1 spray into each nostril daily.     ? famotidine (PEPCID) 20 MG tablet Take 20 mg by mouth daily.     ? fluticasone propionate (FLONASE) 50 mcg/actuation nasal spray 2 sprays into each nostril 2 (two) times a day. 16 g 12   ? fluticasone-umeclidinium-vilanterol (TRELEGY ELLIPTA) 100-62.5-25 mcg DsDv inhaler Inhale 1 Inhalation daily. 180 each 3   ? furosemide (LASIX) 20 MG tablet Take 0.5 tablets (10 mg total) by mouth daily. 30 tablet 3   ? losartan-hydrochlorothiazide (HYZAAR) 100-12.5 mg per tablet Take 1 tablet by mouth daily.  0   ? metFORMIN (GLUCOPHAGE) 500 MG tablet Take 500 mg by mouth 2 (two) times a day with meals.     ? multivitamin (ONE A DAY) per tablet Take 1 tablet by mouth daily.     ? zolpidem (AMBIEN) 10 mg tablet Take 1 tablet by mouth as needed.  1     No current  "facility-administered medications for this visit.        Objective:     /50 (Patient Site: Left Arm, Patient Position: Sitting, Cuff Size: Adult Large)   Pulse 88   Resp 20   Ht 5' 5\" (1.651 m)   Wt 218 lb 11.2 oz (99.2 kg)   BMI 36.39 kg/m    218 lb 11.2 oz (99.2 kg)   [unfilled]  Wt Readings from Last 3 Encounters:   21 218 lb 11.2 oz (99.2 kg)   10/15/20 210 lb (95.3 kg)   20 204 lb (92.5 kg)   Oxygen saturation 95%.    Physical Exam:    GENERAL APPEARANCE: alert, no apparent distress  HEENT: no scleral icterus or xanthelasma  NECK: jugular venous pressure difficult to assess secondary to body habitus.  CHEST: symmetric, the lungs are mildly diminished, no obvious significant wheezing or rales.  CARDIOVASCULAR: regular rhythm with 2/6 early to mid peaking systolic murmur; no carotid bruits  Abdomen: No Organomegaly, masses, bruits, or tenderness. Bowels sounds are present      EXTREMITIES: no cyanosis, clubbing, mild edema    Cardiac Testing:  Echo Complete  Order# [id number]  Reading physician: Bridgette Stoddard MD Ordering physician: Reymundo Francisco MD Study date: 19   Performing Date Performing Department   2019  CARDIAC TESTING [[id number]]   Patient Information    Patient Name   Katt Dsouza MRN   [id number] Sex   Female  1   1940 (78 y.o.)   Indications    Dx: Chronic diastolic congestive heart failure (H) [I50.32 (ICD-10-CM)]   Summary      1.Left ventricle ejection fraction is normal. The calculated left ventricular ejection fraction is 72%.    2.TAPSE is normal, which is consistent with normal right ventricular systolic function.    3.Aortic valve not well visualized, however probably mild aortic stenosis with mild aortic insufficiency. Cannot rule out some contribution to gradient from mid ventricular gradient.    4.No previous study for comparison.        NM Pharmacologic Stress Test  Order# [id number]  Reading physician: David Zamora MD Ordering " physician: System, Provider Not In Study date: 19   Patient Information    Patient Name   Katt Dsouza MRN   [id number] Sex   Female  [birth year]   1940 (78 y.o.)   EKG Scan     Stress Test Data - Scan on 19 9:00 AM by    Indications    SOB (shortness of breath)   Conclusion      The pharmacologic nuclear stress test is negative for inducible myocardial ischemia or infarction.    The left ventricular ejection fraction is 69%.    A brief 3 second run of atrial fibrillation was identified in recovery.    There is no prior study available.         Lab Results:    Lab Results   Component Value Date     2020    K 4.1 2020     2020    CO2 26 2020    BUN 29 (H) 2020    CREATININE 1.17 (H) 2020    CALCIUM 9.3 2020     Lab Results   Component Value Date    CHOL 140 2021    TRIG 208 (H) 2021    HDL 47 (L) 2021     BNP (pg/mL)   Date Value   2019 40     Creatinine (mg/dL)   Date Value   2020 1.17 (H)   2020 1.10   10/14/2019 1.14 (H)   2019 1.15 (H)     LDL Calculated (mg/dL)   Date Value   2021 51   10/14/2019 74   2018 76     Lab Results   Component Value Date    WBC 7.4 2013    HGB 11.1 (L) 2013    HCT 33.5 (L) 2013    MCV 95 2013     2013         This note has been dictated using voice recognition software. Any grammatical or context distortions are unintentional and inherent to the software.

## 2021-06-30 NOTE — PROGRESS NOTES
Progress Notes by Reymundo Francisco MD at 4/30/2021  9:30 AM     Author: Reymundo Francisco MD Service: -- Author Type: Physician    Filed: 4/30/2021 10:45 AM Encounter Date: 4/30/2021 Status: Signed    : Reymundo Francisco MD (Physician)             [facility] Progress Note    Assessment:  1.  Dyspnea on exertion.  I suspect primarily related to her asthma and some deconditioning component.  Recent BNP was well within normal limits at 48.  Congratulated her on her efforts with respect to weight loss.  Her weight is down as outlined in the chart record.  I encouraged her to continue to work on her exercise program and hopefully this will help with respect to the exertional dyspnea.  Plan for chest x-ray.  I have suggested that she be in touch with her primary care provider about the question of sleep apnea.    2.  Aortic stenosis.  Most recent echocardiogram completed last month reveals mild aortic stenosis unchanged from previous.    8.  Mild lower extremity edema.  She is indicated some intolerance to the furosemide.  We talked about keeping her legs elevated and notifying me if she has any progression of symptoms of lower extremity edema monitoring the salt in her diet.          Plan: As outlined above with chest x-ray and follow-up in 6 months.    1. Dyspnea  XR Chest 2 Views   2. Exercise-induced asthma     3. HUBBARD (dyspnea on exertion)     4. Bilateral leg edema     5. Benign essential hypertension     6. Nonrheumatic aortic valve stenosis           An After Visit Summary was printed and given to the patient.    Subjective:    Katt Dsouza is a 80 y.o. female who returned for a planned  follow up visit.  She does continue to feel short of breath with exertion but states that this is improved.  She has been working as a  and walking more and feels as if this has resulted in improvement in her exercise tolerance.  She does not describe orthopnea, PND or chest  discomfort.  She also feels as if her shortness of breath is improved with the Triligy inhaler.  Reviewed recent test results.  She did have a coronary CT angiogram 2019 with a calcium score of 0 and no significant obstructive coronary artery disease.  Cardiogram recently completed revealed normal systolic function with some diastolic relaxation abnormality with mild aortic stenosis unchanged from previous.    Review of Systems:   General: WNL  Eyes: WNL  Ears/Nose/Throat: WNL  Lungs: Shortness of Breath  Heart: Leg Swelling  Stomach: WNL  Bladder: WNL  Muscle/Joints: WNL  Skin: WNL  Nervous System: WNL  Mental Health: WNL     Blood: WNL      Problem List:    Patient Active Problem List   Diagnosis   ? HUBBARD (dyspnea on exertion)   ? Exercise-induced asthma   ? Bilateral leg edema   ? Aortic stenosis   ? Benign essential hypertension   ? Obesity (BMI 35.0-39.9) with comorbidity (H)       Social History     Socioeconomic History   ? Marital status:      Spouse name: Not on file   ? Number of children: Not on file   ? Years of education: Not on file   ? Highest education level: Not on file   Occupational History   ? Not on file   Social Needs   ? Financial resource strain: Not on file   ? Food insecurity     Worry: Not on file     Inability: Not on file   ? Transportation needs     Medical: Not on file     Non-medical: Not on file   Tobacco Use   ? Smoking status: Former Smoker     Quit date: 1995     Years since quittin.3   ? Smokeless tobacco: Never Used   Substance and Sexual Activity   ? Alcohol use: Not on file   ? Drug use: Not on file   ? Sexual activity: Not on file   Lifestyle   ? Physical activity     Days per week: Not on file     Minutes per session: Not on file   ? Stress: Not on file   Relationships   ? Social connections     Talks on phone: Not on file     Gets together: Not on file     Attends [employer] service: Not on file     Active member of club or organization: Not on file      Attends meetings of clubs or organizations: Not on file     Relationship status: Not on file   ? Intimate partner violence     Fear of current or ex partner: Not on file     Emotionally abused: Not on file     Physically abused: Not on file     Forced sexual activity: Not on file   Other Topics Concern   ? Not on file   Social History Narrative   ? Not on file       No family history on file.    Current Outpatient Medications   Medication Sig Dispense Refill   ? acetaminophen-codeine (TYLENOL #3) 300-30 mg per tablet Take 1 tablet by mouth at bedtime as needed.  0   ? allopurinoL (ZYLOPRIM) 100 MG tablet Take 100 mg by mouth daily.     ? amLODIPine (NORVASC) 5 MG tablet Take 5 mg by mouth daily.     ? atorvastatin (LIPITOR) 20 MG tablet Take 20 mg by mouth at bedtime.     ? azelastine (ASTELIN) 137 mcg (0.1 %) nasal spray Apply 1 spray into each nostril daily.     ? famotidine (PEPCID) 20 MG tablet Take 20 mg by mouth daily.     ? fluticasone propionate (FLONASE) 50 mcg/actuation nasal spray 2 sprays into each nostril 2 (two) times a day. 16 g 12   ? furosemide (LASIX) 20 MG tablet Take 0.5 tablets (10 mg total) by mouth daily. 30 tablet 3   ? gabapentin (NEURONTIN) 300 MG capsule Take 300 mg by mouth at bedtime.     ? losartan-hydrochlorothiazide (HYZAAR) 100-12.5 mg per tablet Take 1 tablet by mouth daily.  0   ? metFORMIN (GLUCOPHAGE) 500 MG tablet Take 500 mg by mouth 2 (two) times a day with meals.     ? multivitamin (ONE A DAY) per tablet Take 1 tablet by mouth daily.     ? TRELEGY ELLIPTA 100-62.5-25 mcg DsDv inhaler Inhale 1 Inhalation by mouth once daily     ? zolpidem (AMBIEN) 10 mg tablet Take 1 tablet by mouth as needed.  1   ? albuterol (PROAIR HFA;PROVENTIL HFA;VENTOLIN HFA) 90 mcg/actuation inhaler Inhale 2 puffs every 4 (four) hours as needed for wheezing or shortness of breath. INHALE 2 PUFFS BY INHALATION ROUTE BEFORE EXERCISE AND EVERY 4 HOURS IF NEEDED 1 Inhaler 1     No current  "facility-administered medications for this visit.        Objective:     /58 (Patient Site: Left Arm, Patient Position: Sitting, Cuff Size: Adult Large)   Pulse 84   Resp 16   Ht 5' 5\" (1.651 m)   Wt 201 lb (91.2 kg)   BMI 33.45 kg/m    201 lb (91.2 kg)   [unfilled]  Wt Readings from Last 3 Encounters:   21 201 lb (91.2 kg)   21 218 lb 11.2 oz (99.2 kg)   21 218 lb 11.2 oz (99.2 kg)       Physical Exam:    GENERAL APPEARANCE: alert, no apparent distress  HEENT: no scleral icterus or xanthelasma  NECK: jugular venous pressure within normal limits  CHEST: symmetric, the lungs are clear to auscultation  CARDIOVASCULAR: regular rhythm 1-2 over 6 murmur, click, or gallop; no carotid bruits  Abdomen: No Organomegaly, masses, bruits, or tenderness. Bowels sounds are present      EXTREMITIES: no cyanosis, clubbing or edema    Cardiac Testing:  Echo Complete  Order# [id number]  Reading physician: Kenna Han MD Ordering physician: Reymundo Francisco MD Study date: 21   Performing Date Performing Department   2021 [facility] CARDIAC TESTING [[id number]]   Patient Information    Patient Name   Katt Dsouza MRN   [id number] Sex   Female  1   1940 (80 y.o.)   Indications    Dx: Dyspnea [R06.00 (ICD-10-CM)]   Summary      Moderate left atrial enlargement    Left ventricle ejection fraction is normal. The calculated left ventricular ejection fraction is 65% without wall motion abnormality.    Left ventricular diastolic dysfunction    Normal right ventricular size and systolic function.    Borderline to mild aortic valve stenosis with mild regurgitation.    Trace mitral and tricuspid insufficiency. No evidence of pulmonary hypertension.    When compared to the previous study dated 2019, no significant interval change.        NM Pharmacologic Stress Test  Order# [id number]  Reading physician: David Zamora MD Ordering physician: System, Provider Not In Study date: 19   Patient " Information    Patient Name   Katt Dsouza MRN   [id number] Sex   Female  1   1940 (78 y.o.)   EKG Scan     Stress Test Data - Scan on 19 9:00 AM by    Indications    SOB (shortness of breath)   Conclusion      The pharmacologic nuclear stress test is negative for inducible myocardial ischemia or infarction.    The left ventricular ejection fraction is 69%.    A brief 3 second run of atrial fibrillation was identified in recovery.    There is no prior study available.          MUSE DIAGNOSIS  ECG Clinic - Today  Collected: 19 1011   Result status: Final   Resulting lab: HE MUSE   Value: Sinus rhythm with Premature atrial complexes   Left axis deviation   Abnormal ECG   When compared with ECG of 27-AUG-2013 21:35,   Premature atrial complexes are now Present   Confirmed by KARYNA KINNEY, [facility] LOC:[facility]N ([id number]) on 7/10/2019 2:58:39 PM    CTA Coronary W Calcium Score  Order# [id number]  Reading physician: Reymundo Francisco MD Ordering physician: Reymundo Francisco MD Study date: 19   Patient Information    Patient Name   Katt Dsouza MRN   [id number] Sex   Female  1   1940 (79 y.o.)   Indications    Chest pain, normal EKG   Dx: Chest discomfort [R07.89 (ICD-10-CM)]   Interpretation Summary      The total Agatston calcium score is 0. A calcium score of zero places the individual in the lowest quartile when compared to an age and gender matched control group and implies a low risk of cardiac events in the next 10 years. (less than 1% per year).    No significant obstructive plaque or stenosis within the visualized portions of the coronary tree.    Mitral annular calcification. Moderate calcification of the ascending and descending thoracic aorta. In the descending thoracic aorta there is soft and hardened plaque.    Please see separate report per radiology for additional findings              Lab Results:    Lab Results   Component Value Date     2021    K 3.7 2021      04/26/2021    CO2 25 04/26/2021    BUN 26 04/26/2021    CREATININE 1.17 (H) 04/26/2021    CALCIUM 9.0 04/26/2021     Lab Results   Component Value Date    CHOL 140 03/03/2021    TRIG 208 (H) 03/03/2021    HDL 47 (L) 03/03/2021     BNP (pg/mL)   Date Value   04/26/2021 48   07/05/2019 40     Creatinine (mg/dL)   Date Value   04/26/2021 1.17 (H)   03/24/2021 1.17 (H)   12/04/2020 1.17 (H)   05/18/2020 1.10     LDL Calculated (mg/dL)   Date Value   03/03/2021 51   10/14/2019 74   07/25/2018 76     Lab Results   Component Value Date    WBC 7.4 08/29/2013    HGB 11.1 (L) 08/29/2013    HCT 33.5 (L) 08/29/2013    MCV 95 08/29/2013     08/29/2013         This note has been dictated using voice recognition software. Any grammatical or context distortions are unintentional and inherent to the software.

## 2021-07-03 NOTE — ADDENDUM NOTE
Addendum Note by Tali Toney LPN at 6/12/2019 11:00 AM     Author: Tali Toney LPN Service: -- Author Type: Licensed Nurse    Filed: 6/12/2019  1:04 PM Encounter Date: 6/12/2019 Status: Signed    : Tali Toney LPN (Licensed Nurse)    Addended by: TALI TONEY on: 6/12/2019 01:04 PM        Modules accepted: Orders
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
## 2021-08-12 ENCOUNTER — VIRTUAL VISIT (OUTPATIENT)
Dept: PULMONOLOGY | Facility: OTHER | Age: 81
End: 2021-08-12
Payer: MEDICARE

## 2021-08-12 ENCOUNTER — TELEPHONE (OUTPATIENT)
Dept: PULMONOLOGY | Facility: OTHER | Age: 81
End: 2021-08-12

## 2021-08-12 VITALS — WEIGHT: 201 LBS | HEIGHT: 65 IN | BODY MASS INDEX: 33.49 KG/M2

## 2021-08-12 DIAGNOSIS — J45.40 MODERATE PERSISTENT ASTHMA WITHOUT COMPLICATION: ICD-10-CM

## 2021-08-12 PROCEDURE — 99213 OFFICE O/P EST LOW 20 MIN: CPT | Mod: 95 | Performed by: INTERNAL MEDICINE

## 2021-08-12 RX ORDER — ALBUTEROL SULFATE 90 UG/1
2 AEROSOL, METERED RESPIRATORY (INHALATION) EVERY 4 HOURS PRN
Qty: 8 G | Refills: 3 | Status: SHIPPED | OUTPATIENT
Start: 2021-08-12 | End: 2021-10-25

## 2021-08-12 RX ORDER — FLUTICASONE PROPIONATE 50 MCG
2 SPRAY, SUSPENSION (ML) NASAL 2 TIMES DAILY
Qty: 11.1 ML | Refills: 3 | Status: SHIPPED | OUTPATIENT
Start: 2021-08-12 | End: 2022-01-11

## 2021-08-12 ASSESSMENT — ASTHMA QUESTIONNAIRES
QUESTION_4 LAST FOUR WEEKS HOW OFTEN HAVE YOU USED YOUR RESCUE INHALER OR NEBULIZER MEDICATION (SUCH AS ALBUTEROL): TWO OR THREE TIMES PER WEEK
QUESTION_2 LAST FOUR WEEKS HOW OFTEN HAVE YOU HAD SHORTNESS OF BREATH: THREE TO SIX TIMES A WEEK
QUESTION_1 LAST FOUR WEEKS HOW MUCH OF THE TIME DID YOUR ASTHMA KEEP YOU FROM GETTING AS MUCH DONE AT WORK, SCHOOL OR AT HOME: NONE OF THE TIME
QUESTION_5 LAST FOUR WEEKS HOW WOULD YOU RATE YOUR ASTHMA CONTROL: WELL CONTROLLED
ACT_TOTALSCORE: 20
ACUTE_EXACERBATION_TODAY: NO
QUESTION_3 LAST FOUR WEEKS HOW OFTEN DID YOUR ASTHMA SYMPTOMS (WHEEZING, COUGHING, SHORTNESS OF BREATH, CHEST TIGHTNESS OR PAIN) WAKE YOU UP AT NIGHT OR EARLIER THAN USUAL IN THE MORNING: NOT AT ALL

## 2021-08-12 ASSESSMENT — MIFFLIN-ST. JEOR: SCORE: 1387.61

## 2021-08-12 NOTE — TELEPHONE ENCOUNTER
Prior Authorization Retail Medication Request    Medication/Dose: fluticasone propianoate 50mcg suspension  ICD code (if different than what is on RX):  J45.40  Previously Tried and Failed:    Rationale:      Insurance Name:  [facility]  Insurance ID:  [id number]      Pharmacy Information (if different than what is on RX)  Name:  [facility]  Phone:  [phone]

## 2021-08-12 NOTE — PATIENT INSTRUCTIONS
Plan:    Continue with daily Trelegy and as needed albuterol. Remember to rinse & gargle your mouth and throat after you use Trelegy.    Call our clinic if you run into any issues with you asthma, especially when air quality is olvera-boptimal. In certain situations we may prescribe you short course of steroids to help.     You should follow up in 12 months, or earlier if any issues arise.     If you have any questions or concerns, please, call our clinic at [phone].     It is very important to use good technique when using inhalers. To review correct inhaler technique, consider using YouTube to look for demonstrations. You should exercise common sense when looking for videos -- best and most informative inhaler videos come from health care organizations and/or healthcare providers. Make sure you watch the video for you specific type of inhaler.

## 2021-08-12 NOTE — TELEPHONE ENCOUNTER
Central Prior Authorization Team   Phone: [phone]      PA Initiation    Medication: fluticasone propianoate 50mcg suspension  Insurance Company: [employer] - Phone [phone] Fax [phone]  Pharmacy Filling the Rx: [id number] [place] [address] [address]  Filling Pharmacy Phone: [phone]  Filling Pharmacy Fax:    Start Date: 8/12/2021

## 2021-08-13 ASSESSMENT — ASTHMA QUESTIONNAIRES: ACT_TOTALSCORE: 20

## 2021-08-13 NOTE — TELEPHONE ENCOUNTER
Prior Authorization Approval    Authorization Effective Date: 1/1/2021  Authorization Expiration Date: 12/31/2021  Medication: fluticasone propianoate 50mcg suspension  Approved Dose/Quantity: 16 grams per 15 days (up to 8 sprays per day, total daily dose 400 mcg daily)  Reference #:     Insurance Company: [employer] - Phone [phone] Fax [phone]  Expected CoPay:       CoPay Card Available: No    Foundation Assistance Needed:    Which Pharmacy is filling the prescription (Not needed for infusion/clinic administered): [id number] - [place], [place] - [facility] [address]  Pharmacy Notified: Yes  Patient Notified: Yes

## 2021-09-26 ENCOUNTER — HEALTH MAINTENANCE LETTER (OUTPATIENT)
Age: 81
End: 2021-09-26

## 2021-10-11 ENCOUNTER — TELEPHONE (OUTPATIENT)
Dept: PULMONOLOGY | Facility: OTHER | Age: 81
End: 2021-10-11

## 2021-10-11 NOTE — TELEPHONE ENCOUNTER
"Phone call from patient.  States that her pharmacy ([facility]) has the Maderna boster shot available.  They stated that she just needs a note from her doctor stating that she is eligible.        Reviewed with Dr. Sommers in clinic.  No recommendation has been made as of today regarding the need for \"booster\" of Maderna vaccine.  She cannot give her OK for this at this time.  "

## 2021-10-23 DIAGNOSIS — J45.40 MODERATE PERSISTENT ASTHMA WITHOUT COMPLICATION: ICD-10-CM

## 2021-10-25 RX ORDER — ALBUTEROL SULFATE 90 UG/1
2 AEROSOL, METERED RESPIRATORY (INHALATION) EVERY 4 HOURS PRN
Qty: 8.5 G | Refills: 11 | Status: SHIPPED | OUTPATIENT
Start: 2021-10-25 | End: 2022-08-12

## 2021-12-30 ENCOUNTER — LAB REQUISITION (OUTPATIENT)
Dept: LAB | Facility: CLINIC | Age: 81
End: 2021-12-30
Payer: MEDICARE

## 2021-12-30 DIAGNOSIS — Z01.812 ENCOUNTER FOR PREPROCEDURAL LABORATORY EXAMINATION: ICD-10-CM

## 2021-12-30 DIAGNOSIS — I10 ESSENTIAL (PRIMARY) HYPERTENSION: ICD-10-CM

## 2021-12-30 LAB
ANION GAP SERPL CALCULATED.3IONS-SCNC: 14 MMOL/L (ref 5–18)
BASOPHILS # BLD AUTO: 0.1 10E3/UL (ref 0–0.2)
BASOPHILS NFR BLD AUTO: 1 %
BUN SERPL-MCNC: 18 MG/DL (ref 8–28)
CALCIUM SERPL-MCNC: 9.6 MG/DL (ref 8.5–10.5)
CHLORIDE BLD-SCNC: 104 MMOL/L (ref 98–107)
CO2 SERPL-SCNC: 24 MMOL/L (ref 22–31)
CREAT SERPL-MCNC: 0.93 MG/DL (ref 0.6–1.1)
EOSINOPHIL # BLD AUTO: 0.2 10E3/UL (ref 0–0.7)
EOSINOPHIL NFR BLD AUTO: 3 %
ERYTHROCYTE [DISTWIDTH] IN BLOOD BY AUTOMATED COUNT: 13.7 % (ref 10–15)
GFR SERPL CREATININE-BSD FRML MDRD: 62 ML/MIN/1.73M2
GLUCOSE BLD-MCNC: 82 MG/DL (ref 70–125)
HCT VFR BLD AUTO: 40 % (ref 35–47)
HGB BLD-MCNC: 13.1 G/DL (ref 11.7–15.7)
IMM GRANULOCYTES # BLD: 0 10E3/UL
IMM GRANULOCYTES NFR BLD: 0 %
LYMPHOCYTES # BLD AUTO: 1.5 10E3/UL (ref 0.8–5.3)
LYMPHOCYTES NFR BLD AUTO: 16 %
MCH RBC QN AUTO: 30.5 PG (ref 26.5–33)
MCHC RBC AUTO-ENTMCNC: 32.8 G/DL (ref 31.5–36.5)
MCV RBC AUTO: 93 FL (ref 78–100)
MONOCYTES # BLD AUTO: 0.7 10E3/UL (ref 0–1.3)
MONOCYTES NFR BLD AUTO: 7 %
NEUTROPHILS # BLD AUTO: 6.9 10E3/UL (ref 1.6–8.3)
NEUTROPHILS NFR BLD AUTO: 73 %
NRBC # BLD AUTO: 0 10E3/UL
NRBC BLD AUTO-RTO: 0 /100
PLATELET # BLD AUTO: 255 10E3/UL (ref 150–450)
POTASSIUM BLD-SCNC: 3.8 MMOL/L (ref 3.5–5)
RBC # BLD AUTO: 4.3 10E6/UL (ref 3.8–5.2)
SODIUM SERPL-SCNC: 142 MMOL/L (ref 136–145)
WBC # BLD AUTO: 9.3 10E3/UL (ref 4–11)

## 2021-12-30 PROCEDURE — U0003 INFECTIOUS AGENT DETECTION BY NUCLEIC ACID (DNA OR RNA); SEVERE ACUTE RESPIRATORY SYNDROME CORONAVIRUS 2 (SARS-COV-2) (CORONAVIRUS DISEASE [COVID-19]), AMPLIFIED PROBE TECHNIQUE, MAKING USE OF HIGH THROUGHPUT TECHNOLOGIES AS DESCRIBED BY CMS-2020-01-R: HCPCS | Mod: ORL | Performed by: PHYSICIAN ASSISTANT

## 2021-12-30 PROCEDURE — 85025 COMPLETE CBC W/AUTO DIFF WBC: CPT | Mod: ORL | Performed by: PHYSICIAN ASSISTANT

## 2021-12-30 PROCEDURE — 80048 BASIC METABOLIC PNL TOTAL CA: CPT | Mod: ORL | Performed by: PHYSICIAN ASSISTANT

## 2021-12-31 LAB — SARS-COV-2 RNA RESP QL NAA+PROBE: NEGATIVE

## 2022-01-11 ENCOUNTER — TRANSITIONAL CARE UNIT VISIT (OUTPATIENT)
Dept: GERIATRICS | Facility: CLINIC | Age: 82
End: 2022-01-11
Payer: MEDICARE

## 2022-01-11 VITALS
OXYGEN SATURATION: 90 % | BODY MASS INDEX: 34.95 KG/M2 | WEIGHT: 210 LBS | TEMPERATURE: 97.5 F | SYSTOLIC BLOOD PRESSURE: 148 MMHG | RESPIRATION RATE: 18 BRPM | HEART RATE: 105 BPM | DIASTOLIC BLOOD PRESSURE: 71 MMHG

## 2022-01-11 DIAGNOSIS — I50.32 CHRONIC DIASTOLIC (CONGESTIVE) HEART FAILURE (H): ICD-10-CM

## 2022-01-11 DIAGNOSIS — J45.40 MODERATE PERSISTENT ASTHMA WITHOUT COMPLICATION: ICD-10-CM

## 2022-01-11 DIAGNOSIS — Z98.890 STATUS POST LUMBAR SPINE SURGERY FOR DECOMPRESSION OF SPINAL CORD: ICD-10-CM

## 2022-01-11 DIAGNOSIS — E11.9 TYPE 2 DIABETES MELLITUS WITHOUT COMPLICATION, WITHOUT LONG-TERM CURRENT USE OF INSULIN (H): ICD-10-CM

## 2022-01-11 DIAGNOSIS — J42 CHRONIC BRONCHITIS, UNSPECIFIED CHRONIC BRONCHITIS TYPE (H): ICD-10-CM

## 2022-01-11 DIAGNOSIS — K59.01 SLOW TRANSIT CONSTIPATION: ICD-10-CM

## 2022-01-11 DIAGNOSIS — M48.062 SPINAL STENOSIS OF LUMBAR REGION WITH NEUROGENIC CLAUDICATION: Primary | ICD-10-CM

## 2022-01-11 DIAGNOSIS — I10 ESSENTIAL HYPERTENSION: ICD-10-CM

## 2022-01-11 PROBLEM — F41.9 ANXIETY: Status: ACTIVE | Noted: 2022-01-11

## 2022-01-11 PROBLEM — K64.9 HEMORRHOIDS: Status: ACTIVE | Noted: 2019-02-18

## 2022-01-11 PROBLEM — M26.629 ARTHRALGIA OF TEMPOROMANDIBULAR JOINT: Status: ACTIVE | Noted: 2020-11-18

## 2022-01-11 PROBLEM — J45.909 ASTHMA: Status: ACTIVE | Noted: 2021-03-24

## 2022-01-11 PROBLEM — G89.18 POSTOPERATIVE BACK PAIN: Status: ACTIVE | Noted: 2022-01-11

## 2022-01-11 PROBLEM — K58.9 IRRITABLE BOWEL SYNDROME: Status: ACTIVE | Noted: 2019-02-18

## 2022-01-11 PROBLEM — J44.9 COPD (CHRONIC OBSTRUCTIVE PULMONARY DISEASE) (H): Status: ACTIVE | Noted: 2022-01-04

## 2022-01-11 PROBLEM — G89.29 CHRONIC LOW BACK PAIN WITH LEFT-SIDED SCIATICA: Status: ACTIVE | Noted: 2022-01-11

## 2022-01-11 PROBLEM — I35.9 AORTIC VALVE DISORDER: Status: ACTIVE | Noted: 2022-01-11

## 2022-01-11 PROBLEM — K57.92 DIVERTICULITIS: Status: ACTIVE | Noted: 2022-01-11

## 2022-01-11 PROBLEM — M54.17 LUMBOSACRAL RADICULOPATHY: Status: ACTIVE | Noted: 2022-01-11

## 2022-01-11 PROBLEM — K21.9 GASTROESOPHAGEAL REFLUX DISEASE WITHOUT ESOPHAGITIS: Status: ACTIVE | Noted: 2022-01-11

## 2022-01-11 PROBLEM — E78.2 MIXED HYPERLIPIDEMIA: Status: ACTIVE | Noted: 2022-01-04

## 2022-01-11 PROBLEM — Z98.1 S/P LUMBAR SPINAL FUSION: Status: ACTIVE | Noted: 2022-01-11

## 2022-01-11 PROBLEM — I12.9 CHRONIC KIDNEY DISEASE DUE TO HYPERTENSION: Status: ACTIVE | Noted: 2022-01-11

## 2022-01-11 PROBLEM — K63.5 POLYP OF COLON: Status: ACTIVE | Noted: 2019-02-18

## 2022-01-11 PROBLEM — S46.119A RUPTURE OF LONG HEAD BICEPS TENDON: Status: ACTIVE | Noted: 2022-01-11

## 2022-01-11 PROBLEM — M65.30 ACQUIRED TRIGGER FINGER: Status: ACTIVE | Noted: 2022-01-11

## 2022-01-11 PROBLEM — H93.19 TINNITUS: Status: ACTIVE | Noted: 2022-01-11

## 2022-01-11 PROBLEM — R05.9 COUGH: Status: ACTIVE | Noted: 2022-01-11

## 2022-01-11 PROBLEM — F51.01 PRIMARY INSOMNIA: Status: ACTIVE | Noted: 2022-01-11

## 2022-01-11 PROBLEM — M10.9 ARTICULAR GOUT: Status: ACTIVE | Noted: 2022-01-11

## 2022-01-11 PROBLEM — M19.049 LOCALIZED, PRIMARY OSTEOARTHRITIS OF HAND: Status: ACTIVE | Noted: 2022-01-11

## 2022-01-11 PROBLEM — M54.9 POSTOPERATIVE BACK PAIN: Status: ACTIVE | Noted: 2022-01-11

## 2022-01-11 PROBLEM — Z86.0100 HISTORY OF COLONIC POLYPS: Status: ACTIVE | Noted: 2019-02-18

## 2022-01-11 PROBLEM — M54.42 CHRONIC LOW BACK PAIN WITH LEFT-SIDED SCIATICA: Status: ACTIVE | Noted: 2022-01-11

## 2022-01-11 PROBLEM — D12.0 BENIGN NEOPLASM OF CECUM: Status: ACTIVE | Noted: 2019-02-20

## 2022-01-11 PROBLEM — M47.816 LUMBAR SPONDYLOSIS: Status: ACTIVE | Noted: 2022-01-04

## 2022-01-11 PROCEDURE — 99305 1ST NF CARE MODERATE MDM 35: CPT | Performed by: NURSE PRACTITIONER

## 2022-01-11 RX ORDER — ACETAMINOPHEN 500 MG
500 TABLET ORAL 4 TIMES DAILY PRN
Status: ON HOLD | COMMUNITY
End: 2022-11-08

## 2022-01-11 RX ORDER — OXYCODONE HYDROCHLORIDE 5 MG/1
5-10 TABLET ORAL EVERY 4 HOURS PRN
COMMUNITY
End: 2022-01-12

## 2022-01-11 RX ORDER — MOMETASONE FUROATE MONOHYDRATE 50 UG/1
2 SPRAY, METERED NASAL DAILY
Status: ON HOLD | COMMUNITY
End: 2022-11-08

## 2022-01-11 RX ORDER — SIMETHICONE 80 MG
80 TABLET,CHEWABLE ORAL EVERY 6 HOURS PRN
COMMUNITY
End: 2023-05-16

## 2022-01-11 RX ORDER — ZOLPIDEM TARTRATE 10 MG/1
10 TABLET ORAL
COMMUNITY
End: 2022-01-13

## 2022-01-11 RX ORDER — ACETAMINOPHEN 500 MG
500 TABLET ORAL 4 TIMES DAILY
Status: ON HOLD | COMMUNITY
End: 2022-11-08

## 2022-01-11 RX ORDER — METHOCARBAMOL 500 MG/1
500 TABLET, FILM COATED ORAL 4 TIMES DAILY PRN
Status: ON HOLD | COMMUNITY
End: 2022-11-09

## 2022-01-11 RX ORDER — HYDROCHLOROTHIAZIDE 25 MG/1
50 TABLET ORAL DAILY
COMMUNITY
End: 2024-05-01

## 2022-01-11 RX ORDER — LOSARTAN POTASSIUM 100 MG/1
100 TABLET ORAL DAILY
Status: ON HOLD | COMMUNITY
End: 2024-09-25

## 2022-01-11 RX ORDER — AMOXICILLIN 250 MG
1 CAPSULE ORAL 2 TIMES DAILY PRN
Status: ON HOLD | COMMUNITY
End: 2022-11-09

## 2022-01-12 ENCOUNTER — LAB REQUISITION (OUTPATIENT)
Dept: LAB | Facility: CLINIC | Age: 82
End: 2022-01-12
Payer: MEDICARE

## 2022-01-12 DIAGNOSIS — M48.062 SPINAL STENOSIS OF LUMBAR REGION WITH NEUROGENIC CLAUDICATION: Primary | ICD-10-CM

## 2022-01-12 DIAGNOSIS — D72.829 ELEVATED WHITE BLOOD CELL COUNT, UNSPECIFIED: ICD-10-CM

## 2022-01-12 RX ORDER — OXYCODONE HYDROCHLORIDE 5 MG/1
5-10 TABLET ORAL EVERY 4 HOURS PRN
Qty: 168 TABLET | Refills: 0 | Status: ON HOLD | OUTPATIENT
Start: 2022-01-12 | End: 2022-11-08

## 2022-01-12 NOTE — PROGRESS NOTES
[facility]    Code Status:  FULL CODE   Visit Type:   Chief Complaint   Patient presents with     Hospital F/U     TCU Admission     Facility:  [facility] ([facility]) [[id number]]           History of Present Illness: Katt Dsouza is a 81 year old female who I am seeing today for admit to the TCU. 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  Patient is status post L4-L5 transforaminal lumbar interbody fusion with posterior instrumentation and L3-L5 bilateral medial fasciotomy and decompression.  Past medical history includes hypertension, hyperlipidemia.  Acute on chronic back pain, diabetes mellitus type 2, COPD, asthma, chronic low back pain with left-sided sciatica, gout and chronic diastolic heart failure.  Patient did have some preoperative and postoperative pain issues.  No complications during surgery.  Postoperative hemoglobin 11.0.  COPD on chronic inhaler with Trelegy.  She has not received this since admit.  This is most likely a cost issue.  Of asked her to have her family bring it in.  She also has underlying asthma.  She denies any exercise-induced shortness of breath or asthmatic complications.  Chronic diastolic heart failure controlled with hydrochlorothiazide.  Hypertension on losartan.  Hyperlipidemia on statin.     Today pt sitting up in bedside chair. Pain in her lumbar spine controlled with oxycodone and Tylenol.  Surgical bandage in place.  No strikethrough drainage.  She is questioning a brace.  She did not arrive with any type of splinting.  Per the order she was to have a lumbar brace.  The nurse did consult Ortho who asked the nurse to consult the hospital.  Neither places could give an answer as to whether the patient should be in splinting.  Patient does have a history of left-sided sciatica.  She is reporting some pain in her left groin/lower abdomen.  She reports she has a history of diverticulitis and does  not know if this is a flare.  She is currently afebrile.  She is reporting some constipation and gas.  She last had a bowel movement 2 days prior.  She does have positive bowel sounds.  She is asking for simethicone.  We also discussed the use of senna while on pain medication.  She is in agreement.  Hypertension.  Blood pressure satisfactory.  CHF.  Compensated with hydrochlorothiazide.  She does have some lower extremity edema.  We will continue daily weights.  COPD/asthma.  She continues on Trelegy however her inhaler was ordered but has not arrived from the pharmacy.  I have asked her to have her family bring this in.  She denies any increase shortness of breath.  No increased production of phlegm.    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     Arthralgia of temporomandibular joint 11/18/2020     Aortic valve disorder 01/11/2022     Anxiety 01/11/2022     Acquired trigger finger 01/11/2022     Hemorrhoids 02/18/2019     Gastroesophageal reflux disease without esophagitis 01/11/2022     Diverticulitis 01/11/2022     Cough 01/11/2022     COPD (chronic obstructive pulmonary disease) (H) 01/04/2022     Chronic low back pain with left-sided sciatica 01/11/2022     Chronic diastolic (congestive) heart failure (H) 01/11/2022     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/2019     Type 2 diabetes mellitus without complication (H) 01/04/2022     Tinnitus 01/11/2022     Chronic kidney disease due to hypertension 01/11/2022     Lumbar spondylosis 01/04/2022     S/P lumbar spinal fusion 01/11/2022     Rupture of long head biceps tendon 01/11/2022     Primary insomnia 01/11/2022     Postoperative back pain 01/11/2022     Polyp of colon 02/18/2019     Moderate persistent  asthma without complication 01/11/2022     Lumbosacral radiculopathy 01/11/2022     Localized, primary osteoarthritis of hand 01/11/2022     Irritable bowel syndrome 02/18/2019     Mixed hyperlipidemia 01/04/2022     History of colonic polyps 02/18/2019     Resolved Ambulatory Problems     Diagnosis Date Noted     No Resolved Ambulatory Problems     No Additional Past Medical History       Current Outpatient Medications:      acetaminophen (TYLENOL) 500 MG tablet, Take 500 mg by mouth 4 times daily , Disp: , Rfl:      acetaminophen (TYLENOL) 500 MG tablet, Take 500 mg by mouth 4 times daily as needed for mild pain, Disp: , Rfl:      albuterol (PROAIR HFA/PROVENTIL HFA/VENTOLIN HFA) 108 (90 Base) MCG/ACT inhaler, Inhale 2 puffs into the lungs every 4 hours as needed for shortness of breath / dyspnea or wheezing, Disp: 8.5 g, Rfl: 11     allopurinoL (ZYLOPRIM) 100 MG tablet, [ALLOPURINOL (ZYLOPRIM) 100 MG TABLET] Take 100 mg by mouth daily., Disp: , Rfl:      amLODIPine (NORVASC) 5 MG tablet, [AMLODIPINE (NORVASC) 5 MG TABLET] Take 5 mg by mouth daily., Disp: , Rfl:      atorvastatin (LIPITOR) 20 MG tablet, [ATORVASTATIN (LIPITOR) 20 MG TABLET] Take 20 mg by mouth at bedtime., Disp: , Rfl:      famotidine (PEPCID) 20 MG tablet, [FAMOTIDINE (PEPCID) 20 MG TABLET] Take 20 mg by mouth daily., Disp: , Rfl:      gabapentin (NEURONTIN) 300 MG capsule, Take 600 mg by mouth At Bedtime , Disp: , Rfl:      hydrochlorothiazide (HYDRODIURIL) 25 MG tablet, Take 25 mg by mouth daily, Disp: , Rfl:      losartan (COZAAR) 100 MG tablet, Take 100 mg by mouth daily, Disp: , Rfl:      metFORMIN (GLUCOPHAGE) 500 MG tablet, Take 500 mg by mouth 2 times daily (with meals) 500 mg in am and 1000 mg in pm ., Disp: , Rfl:      methocarbamol (ROBAXIN) 500 MG tablet, Take 500 mg by mouth 4 times daily as needed for muscle spasms, Disp: , Rfl:      mometasone (NASONEX) 50 MCG/ACT nasal spray, Spray 2 sprays into both nostrils daily, Disp: , Rfl:  "     oxyCODONE (ROXICODONE) 5 MG tablet, Take 5-10 mg by mouth every 4 hours as needed for severe pain, Disp: , Rfl:      senna-docusate (SENOKOT-S/PERICOLACE) 8.6-50 MG tablet, Take 1 tablet by mouth 2 times daily , Disp: , Rfl:      simethicone (MYLICON) 80 MG chewable tablet, Take 80 mg by mouth every 6 hours as needed for flatulence or cramping, Disp: , Rfl:      TRELEGY ELLIPTA 100-62.5-25 MCG/INH oral inhaler, Inhale 1 puff into the lungs daily, Disp: 90 each, Rfl: 4     zolpidem (AMBIEN) 10 MG tablet, Take 10 mg by mouth nightly as needed for sleep, Disp: , Rfl:   Allergies   Allergen Reactions     Amoxicillin      Other reaction(s): hives     Penicillin G      Penicillin V      Other reaction(s): hives     Penicillins Hives       All Meds and Allergies reviewed in the record at the facility and is the most up-to-date    REVIEW OF SYSTEMS:   Review of Systems  No fevers or chills. No headache, lightheadedness or dizziness. No SOB, history of asthma controlled with Trelegy, no chest pains or palpitations. Appetite is good. No nausea, vomiting.  Patient is reporting some gas and \"burping up\".  She is having some constipation.  Last BM 2 days ago.  No dysuria, frequency, burning or pain with urination. Otherwise review of systems are negative.     PHYSICAL EXAMINATION:  Physical Exam     Vital signs: BP (!) 148/71   Pulse 105   Temp 97.5  F (36.4  C)   Resp 18   Wt 95.3 kg (210 lb)   SpO2 90%   BMI 34.95 kg/m    General: Awake, Alert, oriented x3, appropriately, follows simple commands, conversant  HEENT:PERRLA, Pink conjunctiva, anicteric sclerae, moist oral mucosa  NECK: Supple, without any lymphadenopathy, or masses  CVS:  S1  S2, without murmur or gallop.   LUNG: Clear to auscultation, No wheezes, rales or rhonci.  BACK: No kyphosis of the thoracic spine  ABDOMEN: Soft,nontender to palpation, with hyperactive bowel sounds  EXTREMITIES: Moves both upper and lower extremities but generalized weakness, " trace pedal edema, no calf tenderness  SKIN: Surgical dressing to lumbar spine.  No redness or warmth outside of bandage.  She does have her lidocaine patch on.  NEUROLOGIC: Intact, pulses palpable  PSYCHIATRIC: Cognition intact.  Pleasant affect.      Labs:  All labs reviewed in the nursing home record and [facility]   @  Lab Results   Component Value Date    WBC 9.3 12/30/2021     Lab Results   Component Value Date    RBC 4.30 12/30/2021     Lab Results   Component Value Date    HGB 13.1 12/30/2021     Lab Results   Component Value Date    HCT 40.0 12/30/2021     Lab Results   Component Value Date    MCV 93 12/30/2021     Lab Results   Component Value Date    MCH 30.5 12/30/2021     Lab Results   Component Value Date    MCHC 32.8 12/30/2021     Lab Results   Component Value Date    RDW 13.7 12/30/2021     Lab Results   Component Value Date     12/30/2021        @Last Comprehensive Metabolic Panel:  Sodium   Date Value Ref Range Status   12/30/2021 142 136 - 145 mmol/L Final     Potassium   Date Value Ref Range Status   12/30/2021 3.8 3.5 - 5.0 mmol/L Final     Chloride   Date Value Ref Range Status   12/30/2021 104 98 - 107 mmol/L Final     Carbon Dioxide (CO2)   Date Value Ref Range Status   12/30/2021 24 22 - 31 mmol/L Final     Anion Gap   Date Value Ref Range Status   12/30/2021 14 5 - 18 mmol/L Final     Glucose   Date Value Ref Range Status   12/30/2021 82 70 - 125 mg/dL Final     Urea Nitrogen   Date Value Ref Range Status   12/30/2021 18 8 - 28 mg/dL Final     Creatinine   Date Value Ref Range Status   12/30/2021 0.93 0.60 - 1.10 mg/dL Final     GFR Estimate   Date Value Ref Range Status   12/30/2021 62 >60 mL/min/1.73m2 Final     Comment:     Effective December 21, 2021 eGFRcr in adults is calculated using the 2021 CKD-EPI creatinine equation which includes age and gender (Sana funez al., NEJM, DOI: 10.1056/IQXSyc1197427)   04/26/2021 45 (L) >60 mL/min/1.73m2 Final     Calcium   Date Value Ref Range Status    12/30/2021 9.6 8.5 - 10.5 mg/dL Final         Assessment/Plan:    ICD-10-CM    1. Spinal stenosis of lumbar region with neurogenic claudication  M48.062  Okay for PT OT eval and treat.   2. Status post lumbar spine surgery for decompression of spinal cord  Z98.890  Continue with oxycodone and Tylenol for pain.  She also has lidocaine patches.  Consult Ortho for written orders for lumbar support brace.  She did not come with this.  Follow-up CBC.   3. Slow transit constipation  K59.01  Start senna S1 tab twice daily.   4. Chronic diastolic (congestive) heart failure (H)  I50.32  Compensated with hydrochlorothiazide.  Daily weight.   5. Chronic bronchitis, unspecified chronic bronchitis type (H)  J42  continues on Trelegy.  This was not supplied by the pharmacy.  I have asked her to have her family bring it in.   6. Moderate persistent asthma without complication  J45.40  See above.   7. Type 2 diabetes mellitus without complication, without long-term current use of insulin (H)  E11.9  Controlled on metformin.   8. Essential hypertension  I10  status under control on losartan.  Follow-up BMP.           45 minutes spent of which greater than 50% was face to face communication with the patient regarding pain management, abdominal discomfort and gas.     This note has been dictated using voice recognition software. Any grammatical or context distortions are unintentional and inherent to the software    Electronically signed by: Kimberly Carrillo, CNP

## 2022-01-13 DIAGNOSIS — G47.00 PERSISTENT INSOMNIA: Primary | ICD-10-CM

## 2022-01-13 LAB
ANION GAP SERPL CALCULATED.3IONS-SCNC: 10 MMOL/L (ref 5–18)
BUN SERPL-MCNC: 12 MG/DL (ref 8–28)
CALCIUM SERPL-MCNC: 9 MG/DL (ref 8.5–10.5)
CHLORIDE BLD-SCNC: 102 MMOL/L (ref 98–107)
CO2 SERPL-SCNC: 28 MMOL/L (ref 22–31)
CREAT SERPL-MCNC: 0.88 MG/DL (ref 0.6–1.1)
ERYTHROCYTE [DISTWIDTH] IN BLOOD BY AUTOMATED COUNT: 13.7 % (ref 10–15)
GFR SERPL CREATININE-BSD FRML MDRD: 66 ML/MIN/1.73M2
GLUCOSE BLD-MCNC: 65 MG/DL (ref 70–125)
HCT VFR BLD AUTO: 33.4 % (ref 35–47)
HGB BLD-MCNC: 10.4 G/DL (ref 11.7–15.7)
MCH RBC QN AUTO: 29.7 PG (ref 26.5–33)
MCHC RBC AUTO-ENTMCNC: 31.1 G/DL (ref 31.5–36.5)
MCV RBC AUTO: 95 FL (ref 78–100)
PLATELET # BLD AUTO: 320 10E3/UL (ref 150–450)
POTASSIUM BLD-SCNC: 3.9 MMOL/L (ref 3.5–5)
RBC # BLD AUTO: 3.5 10E6/UL (ref 3.8–5.2)
SODIUM SERPL-SCNC: 140 MMOL/L (ref 136–145)
WBC # BLD AUTO: 9.4 10E3/UL (ref 4–11)

## 2022-01-13 PROCEDURE — 36415 COLL VENOUS BLD VENIPUNCTURE: CPT | Performed by: NURSE PRACTITIONER

## 2022-01-13 PROCEDURE — 80048 BASIC METABOLIC PNL TOTAL CA: CPT | Performed by: NURSE PRACTITIONER

## 2022-01-13 PROCEDURE — 85027 COMPLETE CBC AUTOMATED: CPT | Performed by: NURSE PRACTITIONER

## 2022-01-13 RX ORDER — ZOLPIDEM TARTRATE 10 MG/1
10 TABLET ORAL
Qty: 30 TABLET | Refills: 0 | Status: SHIPPED | OUTPATIENT
Start: 2022-01-13 | End: 2022-01-24

## 2022-01-15 ENCOUNTER — LAB REQUISITION (OUTPATIENT)
Dept: LAB | Facility: CLINIC | Age: 82
End: 2022-01-15
Payer: MEDICARE

## 2022-01-15 LAB
ALBUMIN UR-MCNC: NEGATIVE MG/DL
APPEARANCE UR: CLEAR
BILIRUB UR QL STRIP: NEGATIVE
COLOR UR AUTO: COLORLESS
GLUCOSE UR STRIP-MCNC: NEGATIVE MG/DL
HGB UR QL STRIP: ABNORMAL
KETONES UR STRIP-MCNC: NEGATIVE MG/DL
LEUKOCYTE ESTERASE UR QL STRIP: ABNORMAL
NITRATE UR QL: NEGATIVE
PH UR STRIP: 6.5 [PH] (ref 5–7)
RBC URINE: 3 /HPF
SP GR UR STRIP: 1.01 (ref 1–1.03)
SQUAMOUS EPITHELIAL: 1 /HPF
TRANSITIONAL EPI: <1 /HPF
UROBILINOGEN UR STRIP-MCNC: <2 MG/DL
WBC URINE: 25 /HPF

## 2022-01-15 PROCEDURE — 87086 URINE CULTURE/COLONY COUNT: CPT | Performed by: FAMILY MEDICINE

## 2022-01-15 PROCEDURE — 81001 URINALYSIS AUTO W/SCOPE: CPT | Performed by: FAMILY MEDICINE

## 2022-01-16 ENCOUNTER — HEALTH MAINTENANCE LETTER (OUTPATIENT)
Age: 82
End: 2022-01-16

## 2022-01-17 ENCOUNTER — TRANSITIONAL CARE UNIT VISIT (OUTPATIENT)
Dept: GERIATRICS | Facility: CLINIC | Age: 82
End: 2022-01-17
Payer: MEDICARE

## 2022-01-17 VITALS
RESPIRATION RATE: 20 BRPM | TEMPERATURE: 97.1 F | WEIGHT: 194 LBS | SYSTOLIC BLOOD PRESSURE: 129 MMHG | DIASTOLIC BLOOD PRESSURE: 65 MMHG | HEART RATE: 76 BPM | OXYGEN SATURATION: 95 % | BODY MASS INDEX: 32.28 KG/M2

## 2022-01-17 DIAGNOSIS — M48.062 SPINAL STENOSIS OF LUMBAR REGION WITH NEUROGENIC CLAUDICATION: Primary | ICD-10-CM

## 2022-01-17 DIAGNOSIS — I50.32 CHRONIC DIASTOLIC (CONGESTIVE) HEART FAILURE (H): ICD-10-CM

## 2022-01-17 DIAGNOSIS — K59.01 SLOW TRANSIT CONSTIPATION: ICD-10-CM

## 2022-01-17 DIAGNOSIS — J42 CHRONIC BRONCHITIS, UNSPECIFIED CHRONIC BRONCHITIS TYPE (H): ICD-10-CM

## 2022-01-17 DIAGNOSIS — J45.40 MODERATE PERSISTENT ASTHMA WITHOUT COMPLICATION: ICD-10-CM

## 2022-01-17 DIAGNOSIS — Z98.890 STATUS POST LUMBAR SPINE SURGERY FOR DECOMPRESSION OF SPINAL CORD: ICD-10-CM

## 2022-01-17 DIAGNOSIS — E11.9 TYPE 2 DIABETES MELLITUS WITHOUT COMPLICATION, WITHOUT LONG-TERM CURRENT USE OF INSULIN (H): ICD-10-CM

## 2022-01-17 PROBLEM — G47.00 PERSISTENT INSOMNIA: Status: ACTIVE | Noted: 2022-01-17

## 2022-01-17 PROCEDURE — 99309 SBSQ NF CARE MODERATE MDM 30: CPT | Performed by: NURSE PRACTITIONER

## 2022-01-18 NOTE — PROGRESS NOTES
[facility]    Code Status:  FULL CODE   Visit Type:   Chief Complaint   Patient presents with     Nursing Home Acute     TCU Follow up     Facility:  [facility] ([facility]) [[id number]]           History of Present Illness: Katt Dsouza is a 81 year old female who I am seeing today for admit to the TCU. 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  Patient is status post L4-L5 transforaminal lumbar interbody fusion with posterior instrumentation and L3-L5 bilateral medial fasciotomy and decompression.  Past medical history includes hypertension, hyperlipidemia.  Acute on chronic back pain, diabetes mellitus type 2, COPD, asthma, chronic low back pain with left-sided sciatica, gout and chronic diastolic heart failure.  Patient did have some preoperative and postoperative pain issues.  No complications during surgery.  Postoperative hemoglobin 11.0.  COPD on chronic inhaler with Trelegy.  She has not received this since admit.  This is most likely a cost issue.  Of asked her to have her family bring it in.  She also has underlying asthma.  She denies any exercise-induced shortness of breath or asthmatic complications.  Chronic diastolic heart failure controlled with hydrochlorothiazide.  Hypertension on losartan.  Hyperlipidemia on statin.     Today pt sitting up in bedside chair.  Patient with L4-L5 fusion with decompression.  Incision intact with Steri-Strips.  She reports that her back pain is improving.  She had left-sided radiculopathy prior to surgery however this is greatly improved.  She continues on oxycodone and Tylenol for pain.  There is still some question of whether or not she was post to receive a lumbar support brace.  Nursing staff will follow up today with Ortho.  I did instruct them to do that last week however there is been no resolution.  Patient is reporting some pain in her left ankle.  Full range of motion noted.   Some slight tenderness over ankle ligaments.  I have therapy evaluate.  Patient was complaining of some gas late last week.  She was given some simethicone.  This is greatly improved.  She did think she was having a flare of her diverticulitis.  She does alternate between constipation and diarrhea.  She feels that her abdominal complaints are greatly improved today.  Dietitian noted about a 10 pound weight loss.  She did have some lower extremity edema which has improved.  Patient reports that her appetite is improving.  It was not very good upon admit.  Over the weekend she complained of some burning with urination.  Her UA looks negative.  UC is pending.  Patient does have some anxiety per the nursing staff.  Chronic diastolic heart failure on hydrochlorothiazide.  Hypertension.  Satisfactory control.  COPD/asthma.  Patient denies any increased production of phlegm or shortness of breath.      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     Arthralgia of temporomandibular joint 11/18/2020     Aortic valve disorder 01/11/2022     Anxiety 01/11/2022     Acquired trigger finger 01/11/2022     Hemorrhoids 02/18/2019     Gastroesophageal reflux disease without esophagitis 01/11/2022     Diverticulitis 01/11/2022     Cough 01/11/2022     COPD (chronic obstructive pulmonary disease) (H) 01/04/2022     Chronic low back pain with left-sided sciatica 01/11/2022     Chronic diastolic (congestive) heart failure (H) 01/11/2022     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/2019     Type 2 diabetes mellitus without complication, without long-term current use of insulin (H) 01/04/2022     Tinnitus 01/11/2022     Chronic kidney disease due to hypertension 01/11/2022     Lumbar  spondylosis 01/04/2022     Status post lumbar spine surgery for decompression of spinal cord 01/11/2022     Rupture of long head biceps tendon 01/11/2022     Primary insomnia 01/11/2022     Postoperative back pain 01/11/2022     Polyp of colon 02/18/2019     Moderate persistent asthma without complication 01/11/2022     Lumbosacral radiculopathy 01/11/2022     Localized, primary osteoarthritis of hand 01/11/2022     Irritable bowel syndrome 02/18/2019     Mixed hyperlipidemia 01/04/2022     History of colonic polyps 02/18/2019     Persistent insomnia 01/17/2022     Spinal stenosis of lumbar region with neurogenic claudication 01/17/2022     Chronic bronchitis, unspecified chronic bronchitis type (H) 01/17/2022     Slow transit constipation 01/17/2022     Resolved Ambulatory Problems     Diagnosis Date Noted     No Resolved Ambulatory Problems     No Additional Past Medical History       Current Outpatient Medications:      acetaminophen (TYLENOL) 500 MG tablet, Take 500 mg by mouth 4 times daily , Disp: , Rfl:      acetaminophen (TYLENOL) 500 MG tablet, Take 500 mg by mouth 4 times daily as needed for mild pain, Disp: , Rfl:      albuterol (PROAIR HFA/PROVENTIL HFA/VENTOLIN HFA) 108 (90 Base) MCG/ACT inhaler, Inhale 2 puffs into the lungs every 4 hours as needed for shortness of breath / dyspnea or wheezing, Disp: 8.5 g, Rfl: 11     allopurinoL (ZYLOPRIM) 100 MG tablet, [ALLOPURINOL (ZYLOPRIM) 100 MG TABLET] Take 100 mg by mouth daily., Disp: , Rfl:      amLODIPine (NORVASC) 5 MG tablet, [AMLODIPINE (NORVASC) 5 MG TABLET] Take 5 mg by mouth daily., Disp: , Rfl:      atorvastatin (LIPITOR) 20 MG tablet, [ATORVASTATIN (LIPITOR) 20 MG TABLET] Take 20 mg by mouth at bedtime., Disp: , Rfl:      famotidine (PEPCID) 20 MG tablet, [FAMOTIDINE (PEPCID) 20 MG TABLET] Take 20 mg by mouth daily., Disp: , Rfl:      gabapentin (NEURONTIN) 300 MG capsule, Take 600 mg by mouth At Bedtime , Disp: , Rfl:      hydrochlorothiazide  "(HYDRODIURIL) 25 MG tablet, Take 25 mg by mouth daily, Disp: , Rfl:      losartan (COZAAR) 100 MG tablet, Take 100 mg by mouth daily, Disp: , Rfl:      metFORMIN (GLUCOPHAGE) 500 MG tablet, Take 500 mg by mouth 2 times daily (with meals) 500 mg in am and 1000 mg in pm ., Disp: , Rfl:      methocarbamol (ROBAXIN) 500 MG tablet, Take 500 mg by mouth 4 times daily as needed for muscle spasms, Disp: , Rfl:      mometasone (NASONEX) 50 MCG/ACT nasal spray, Spray 2 sprays into both nostrils daily, Disp: , Rfl:      oxyCODONE (ROXICODONE) 5 MG tablet, Take 1-2 tablets (5-10 mg) by mouth every 4 hours as needed for severe pain, Disp: 168 tablet, Rfl: 0     senna-docusate (SENOKOT-S/PERICOLACE) 8.6-50 MG tablet, Take 1 tablet by mouth 2 times daily , Disp: , Rfl:      simethicone (MYLICON) 80 MG chewable tablet, Take 80 mg by mouth every 6 hours as needed for flatulence or cramping, Disp: , Rfl:      TRELEGY ELLIPTA 100-62.5-25 MCG/INH oral inhaler, Inhale 1 puff into the lungs daily, Disp: 90 each, Rfl: 4     zolpidem (AMBIEN) 10 MG tablet, Take 1 tablet (10 mg) by mouth nightly as needed for sleep, Disp: 30 tablet, Rfl: 0  Allergies   Allergen Reactions     Amoxicillin      Other reaction(s): hives     Penicillin G      Penicillin V      Other reaction(s): hives     Penicillins Hives       All Meds and Allergies reviewed in the record at the facility and is the most up-to-date    REVIEW OF SYSTEMS:   Review of Systems  No fevers or chills. No headache, lightheadedness or dizziness. No SOB, history of asthma controlled with Trelegy, no chest pains or palpitations. Appetite is good. No nausea, vomiting.  Patient is reporting some gas and \"burping up\".  She is having some constipation.  Last BM 2 days ago.  No dysuria, frequency, burning or pain with urination. Otherwise review of systems are negative.     PHYSICAL EXAMINATION:  Physical Exam     Vital signs: /65   Pulse 76   Temp 97.1  F (36.2  C)   Resp 20   Wt 88 " kg (194 lb)   SpO2 95%   BMI 32.28 kg/m    General: Awake, Alert, oriented x3, appropriately, follows simple commands, conversant  HEENT:PERRLA, Pink conjunctiva, anicteric sclerae, moist oral mucosa  NECK: Supple, without any lymphadenopathy, or masses  CVS:  S1  S2, without murmur or gallop.   LUNG: Clear to auscultation, No wheezes, rales or rhonci.  BACK: No kyphosis of the thoracic spine  ABDOMEN: Soft,nontender to palpation, with hyperactive bowel sounds  EXTREMITIES: Moves both upper and lower extremities but generalized weakness, trace pedal edema, no calf tenderness.  Full range of motion of both ankles.  SKIN: Incision to lumbar spine intact with 3 Steri-Strips.  No redness or warmth outside of bandage.  She does have her lidocaine patch on.  NEUROLOGIC: Intact, pulses palpable  PSYCHIATRIC: Cognition intact.  Pleasant affect.      Labs:  All labs reviewed in the nursing home record and [employer]   @  Lab Results   Component Value Date    WBC 9.3 12/30/2021     Lab Results   Component Value Date    RBC 4.30 12/30/2021     Lab Results   Component Value Date    HGB 13.1 12/30/2021     Lab Results   Component Value Date    HCT 40.0 12/30/2021     Lab Results   Component Value Date    MCV 93 12/30/2021     Lab Results   Component Value Date    MCH 30.5 12/30/2021     Lab Results   Component Value Date    MCHC 32.8 12/30/2021     Lab Results   Component Value Date    RDW 13.7 12/30/2021     Lab Results   Component Value Date     12/30/2021        @Last Comprehensive Metabolic Panel:  Sodium   Date Value Ref Range Status   01/13/2022 140 136 - 145 mmol/L Final     Potassium   Date Value Ref Range Status   01/13/2022 3.9 3.5 - 5.0 mmol/L Final     Chloride   Date Value Ref Range Status   01/13/2022 102 98 - 107 mmol/L Final     Carbon Dioxide (CO2)   Date Value Ref Range Status   01/13/2022 28 22 - 31 mmol/L Final     Anion Gap   Date Value Ref Range Status   01/13/2022 10 5 - 18 mmol/L Final     Glucose    Date Value Ref Range Status   01/13/2022 65 (L) 70 - 125 mg/dL Final     Urea Nitrogen   Date Value Ref Range Status   01/13/2022 12 8 - 28 mg/dL Final     Creatinine   Date Value Ref Range Status   01/13/2022 0.88 0.60 - 1.10 mg/dL Final     GFR Estimate   Date Value Ref Range Status   01/13/2022 66 >60 mL/min/1.73m2 Final     Comment:     Effective December 21, 2021 eGFRcr in adults is calculated using the 2021 CKD-EPI creatinine equation which includes age and gender (Sana et al., NE, DOI: 10.1056/EELUsp6549845)   04/26/2021 45 (L) >60 mL/min/1.73m2 Final     Calcium   Date Value Ref Range Status   01/13/2022 9.0 8.5 - 10.5 mg/dL Final         Assessment/Plan:    ICD-10-CM    1. Spinal stenosis of lumbar region with neurogenic claudication  M48.062  Continues in therapy.   2. Status post lumbar spine surgery for decompression of spinal cord  Z98.890  Continue with oxycodone and Tylenol for pain.  She also has lidocaine patches.  Consult Ortho again for written orders for lumbar support brace.  She did not come with this.  Follow-up CBC unremarkable.   3. Slow transit constipation  K59.01  Continue senna S1 tab twice daily.  History of diverticulitis.  Patient alternates between constipation and diarrhea.   4. Chronic diastolic (congestive) heart failure (H)  I50.32  Compensated with hydrochlorothiazide.  Weight down about 10 pounds since admit.  Edema greatly improved.   5. Chronic bronchitis, unspecified chronic bronchitis type (H)  J42  continues on Trelegy.    6. Moderate persistent asthma without complication  J45.40  See above.   7. Type 2 diabetes mellitus without complication, without long-term current use of insulin (H)  E11.9  Controlled on metformin.   8. Essential hypertension  I10  status under control on losartan.  Follow-up BMP unremarkable.           45 minutes spent of which greater than 50% was face to face communication with the patient regarding pain management, abdominal discomfort and  gas.     This note has been dictated using voice recognition software. Any grammatical or context distortions are unintentional and inherent to the software    Electronically signed by: Kimberly Carrillo CNP

## 2022-01-20 LAB
BACTERIA UR CULT: ABNORMAL
BACTERIA UR CULT: ABNORMAL

## 2022-01-21 ENCOUNTER — MEDICAL CORRESPONDENCE (OUTPATIENT)
Dept: HEALTH INFORMATION MANAGEMENT | Facility: CLINIC | Age: 82
End: 2022-01-21
Payer: MEDICARE

## 2022-01-24 DIAGNOSIS — G47.00 PERSISTENT INSOMNIA: ICD-10-CM

## 2022-01-24 RX ORDER — ZOLPIDEM TARTRATE 10 MG/1
10 TABLET ORAL
Qty: 30 TABLET | Refills: 0 | Status: SHIPPED | OUTPATIENT
Start: 2022-01-24 | End: 2024-09-24

## 2022-01-25 ENCOUNTER — TRANSITIONAL CARE UNIT VISIT (OUTPATIENT)
Dept: GERIATRICS | Facility: CLINIC | Age: 82
End: 2022-01-25
Payer: MEDICARE

## 2022-01-25 VITALS
OXYGEN SATURATION: 97 % | DIASTOLIC BLOOD PRESSURE: 68 MMHG | TEMPERATURE: 96.8 F | BODY MASS INDEX: 30.89 KG/M2 | HEART RATE: 66 BPM | SYSTOLIC BLOOD PRESSURE: 132 MMHG | WEIGHT: 192.2 LBS | RESPIRATION RATE: 20 BRPM | HEIGHT: 66 IN

## 2022-01-25 DIAGNOSIS — I50.32 CHRONIC DIASTOLIC (CONGESTIVE) HEART FAILURE (H): ICD-10-CM

## 2022-01-25 DIAGNOSIS — E11.9 TYPE 2 DIABETES MELLITUS WITHOUT COMPLICATION, WITHOUT LONG-TERM CURRENT USE OF INSULIN (H): ICD-10-CM

## 2022-01-25 DIAGNOSIS — G47.00 PERSISTENT INSOMNIA: ICD-10-CM

## 2022-01-25 DIAGNOSIS — J45.40 MODERATE PERSISTENT ASTHMA WITHOUT COMPLICATION: ICD-10-CM

## 2022-01-25 DIAGNOSIS — Z98.890 STATUS POST LUMBAR SPINE SURGERY FOR DECOMPRESSION OF SPINAL CORD: ICD-10-CM

## 2022-01-25 DIAGNOSIS — J01.01 ACUTE RECURRENT MAXILLARY SINUSITIS: Primary | ICD-10-CM

## 2022-01-25 DIAGNOSIS — I10 ESSENTIAL HYPERTENSION: ICD-10-CM

## 2022-01-25 DIAGNOSIS — K59.01 SLOW TRANSIT CONSTIPATION: ICD-10-CM

## 2022-01-25 DIAGNOSIS — M48.062 SPINAL STENOSIS OF LUMBAR REGION WITH NEUROGENIC CLAUDICATION: ICD-10-CM

## 2022-01-25 PROCEDURE — 99316 NF DSCHRG MGMT 30 MIN+: CPT | Performed by: NURSE PRACTITIONER

## 2022-01-25 RX ORDER — AZITHROMYCIN 250 MG
CAPSULE ORAL DAILY
COMMUNITY
End: 2022-11-04

## 2022-01-25 ASSESSMENT — MIFFLIN-ST. JEOR: SCORE: 1363.56

## 2022-01-26 NOTE — PROGRESS NOTES
[facility] GERIATRIC SERVICES    Code Status:  FULL CODE   Visit Type:   Chief Complaint   Patient presents with     RECHECK     Facility:  [facility] [place] ([facility]) [[id number]]           History of Present Illness: Katt Dsouza is a 81 year old female who I am seeing today for discharge from the TCU. 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  Patient is status post L4-L5 transforaminal lumbar interbody fusion with posterior instrumentation and L3-L5 bilateral medial fasciotomy and decompression.  Past medical history includes hypertension, hyperlipidemia.  Acute on chronic back pain, diabetes mellitus type 2, COPD, asthma, chronic low back pain with left-sided sciatica, gout and chronic diastolic heart failure.  Patient did have some preoperative and postoperative pain issues.  No complications during surgery.  Postoperative hemoglobin 11.0.  COPD on chronic inhaler with Trelegy.  She has not received this since admit.  This is most likely a cost issue.  Of asked her to have her family bring it in.  She also has underlying asthma.  She denies any exercise-induced shortness of breath or asthmatic complications.  Chronic diastolic heart failure controlled with hydrochlorothiazide.  Hypertension on losartan.  Hyperlipidemia on statin.     Today pt sitting up in bedside chair.  Patient status post L4-L5 fusion with decompression. Incision dry and intact open to air.  Patient continues on oxycodone and Tylenol for pain.  Today she is upset that she has not been given to pain pills every 4 hours.  We talked about attempting to wean back.  Today patient complaining of sinus pressure and pain over the maxillary and frontal sinuses.  She reports she does have history of recurrent chronic sinusitis.  She tells me that Claritin and Zyrtec or over-the-counter medications have never worked.  She is afebrile.  She reports coughing at night with lying flat.  She reports  intense headache over the last 3 days.  She has been receiving Nasonex without improvement.  I did agree to treat her with a Z-Jose.  Chronic insomnia on Ambien.  Anxiety.  Currently on no medication. Chronic diastolic heart failure on hydrochlorothiazide.  Hypertension.  Satisfactory control.  COPD/asthma.  Patient denies any increased production of phlegm or shortness of breath.      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     Arthralgia of temporomandibular joint 11/18/2020     Aortic valve disorder 01/11/2022     Anxiety 01/11/2022     Acquired trigger finger 01/11/2022     Hemorrhoids 02/18/2019     Gastroesophageal reflux disease without esophagitis 01/11/2022     Diverticulitis 01/11/2022     Cough 01/11/2022     Chronic obstructive pulmonary disease (H) 01/04/2022     Chronic low back pain with left-sided sciatica 01/11/2022     Chronic diastolic (congestive) heart failure (H) 01/11/2022     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     Type 2 diabetes mellitus without complication, without long-term current use of insulin (H) 01/04/2022     Tinnitus 01/11/2022     Chronic kidney disease due to hypertension 01/11/2022     Lumbar spondylosis 01/04/2022     History of spinal fusion 01/11/2022     Rupture of long head biceps tendon 01/11/2022     Primary insomnia 01/11/2022     Postoperative back pain 01/11/2022     Polyp of colon 02/18/2019     Moderate persistent asthma without complication 01/11/2022     Lumbosacral radiculopathy 01/11/2022     Localized, primary osteoarthritis of hand 01/11/2022     Irritable bowel syndrome 02/18/2019     Mixed hyperlipidemia 01/04/2022     History of colonic polyps 02/18/2019     Persistent insomnia 01/17/2022      Spinal stenosis of lumbar region with neurogenic claudication 01/17/2022     Chronic bronchitis, unspecified chronic bronchitis type (H) 01/17/2022     Slow transit constipation 01/17/2022     Resolved Ambulatory Problems     Diagnosis Date Noted     No Resolved Ambulatory Problems     No Additional Past Medical History       Current Outpatient Medications:      ZITHROMAX Z-ANDREW 250 MG tablet, Take by mouth daily, Disp: , Rfl:      acetaminophen (TYLENOL) 500 MG tablet, Take 500 mg by mouth 4 times daily , Disp: , Rfl:      acetaminophen (TYLENOL) 500 MG tablet, Take 500 mg by mouth 4 times daily as needed for mild pain, Disp: , Rfl:      albuterol (PROAIR HFA/PROVENTIL HFA/VENTOLIN HFA) 108 (90 Base) MCG/ACT inhaler, Inhale 2 puffs into the lungs every 4 hours as needed for shortness of breath / dyspnea or wheezing, Disp: 8.5 g, Rfl: 11     allopurinoL (ZYLOPRIM) 100 MG tablet, [ALLOPURINOL (ZYLOPRIM) 100 MG TABLET] Take 100 mg by mouth daily., Disp: , Rfl:      amLODIPine (NORVASC) 5 MG tablet, [AMLODIPINE (NORVASC) 5 MG TABLET] Take 5 mg by mouth daily., Disp: , Rfl:      atorvastatin (LIPITOR) 20 MG tablet, [ATORVASTATIN (LIPITOR) 20 MG TABLET] Take 20 mg by mouth at bedtime., Disp: , Rfl:      famotidine (PEPCID) 20 MG tablet, [FAMOTIDINE (PEPCID) 20 MG TABLET] Take 20 mg by mouth daily., Disp: , Rfl:      gabapentin (NEURONTIN) 300 MG capsule, Take 600 mg by mouth At Bedtime , Disp: , Rfl:      hydrochlorothiazide (HYDRODIURIL) 25 MG tablet, Take 25 mg by mouth daily, Disp: , Rfl:      losartan (COZAAR) 100 MG tablet, Take 100 mg by mouth daily, Disp: , Rfl:      metFORMIN (GLUCOPHAGE) 500 MG tablet, Take 500 mg by mouth 2 times daily (with meals) 500 mg in am and 1000 mg in pm ., Disp: , Rfl:      methocarbamol (ROBAXIN) 500 MG tablet, Take 500 mg by mouth 4 times daily as needed for muscle spasms, Disp: , Rfl:      mometasone (NASONEX) 50 MCG/ACT nasal spray, Spray 2 sprays into both nostrils daily, Disp: ,  "Rfl:      oxyCODONE (ROXICODONE) 5 MG tablet, Take 1-2 tablets (5-10 mg) by mouth every 4 hours as needed for severe pain, Disp: 168 tablet, Rfl: 0     senna-docusate (SENOKOT-S/PERICOLACE) 8.6-50 MG tablet, Take 1 tablet by mouth 2 times daily , Disp: , Rfl:      simethicone (MYLICON) 80 MG chewable tablet, Take 80 mg by mouth every 6 hours as needed for flatulence or cramping, Disp: , Rfl:      TRELEGY ELLIPTA 100-62.5-25 MCG/INH oral inhaler, Inhale 1 puff into the lungs daily, Disp: 90 each, Rfl: 4     zolpidem (AMBIEN) 10 MG tablet, Take 1 tablet (10 mg) by mouth nightly as needed for sleep, Disp: 30 tablet, Rfl: 0  Allergies   Allergen Reactions     Amoxicillin      Other reaction(s): hives     Penicillin G      Penicillin V      Other reaction(s): hives     Penicillins Hives       All Meds and Allergies reviewed in the record at the facility and is the most up-to-date    REVIEW OF SYSTEMS:   Review of Systems  No fevers or chills. No headache, lightheadedness or dizziness. No SOB, history of asthma controlled with Trelegy, no chest pains or palpitations. Appetite is good. No nausea, vomiting.  Patient is reporting some gas and \"burping up\".  She is having some constipation.  Last BM 2 days ago.  No dysuria, frequency, burning or pain with urination. Otherwise review of systems are negative.     PHYSICAL EXAMINATION:  Physical Exam     Vital signs: /68   Pulse 66   Temp 96.8  F (36  C)   Resp 20   Ht 1.676 m (5' 6\")   Wt 87.2 kg (192 lb 3.2 oz)   SpO2 97%   BMI 31.02 kg/m    General: Awake, Alert, oriented x3, appropriately, follows simple commands, conversant  HEENT:PERRLA, Pink conjunctiva, anicteric sclerae, moist oral mucosa.  Tenderness with palpitation over the frontal maxillary sinuses.  NECK: Supple, +lymphadenopathy left neck with slight tenderness.  CVS:  S1  S2, without murmur or gallop.   LUNG: Clear to auscultation, No wheezes, rales or rhonci.  BACK: No kyphosis of the thoracic " spine  ABDOMEN: Soft,nontender to palpation, with hyperactive bowel sounds  EXTREMITIES: Moves both upper and lower extremities but generalized weakness, trace pedal edema, no calf tenderness.  Full range of motion of both ankles.  SKIN: Incision to lumbar spine intact open to air.  No redness or warmth outside of bandage.  She does have her lidocaine patch on.  NEUROLOGIC: Intact, pulses palpable  PSYCHIATRIC: Cognition intact.  Pleasant affect.      Labs:  All labs reviewed in the nursing home record and Epic   @  Lab Results   Component Value Date    WBC 9.3 12/30/2021     Lab Results   Component Value Date    RBC 4.30 12/30/2021     Lab Results   Component Value Date    HGB 13.1 12/30/2021     Lab Results   Component Value Date    HCT 40.0 12/30/2021     Lab Results   Component Value Date    MCV 93 12/30/2021     Lab Results   Component Value Date    MCH 30.5 12/30/2021     Lab Results   Component Value Date    MCHC 32.8 12/30/2021     Lab Results   Component Value Date    RDW 13.7 12/30/2021     Lab Results   Component Value Date     12/30/2021        @Last Comprehensive Metabolic Panel:  Sodium   Date Value Ref Range Status   01/13/2022 140 136 - 145 mmol/L Final     Potassium   Date Value Ref Range Status   01/13/2022 3.9 3.5 - 5.0 mmol/L Final     Chloride   Date Value Ref Range Status   01/13/2022 102 98 - 107 mmol/L Final     Carbon Dioxide (CO2)   Date Value Ref Range Status   01/13/2022 28 22 - 31 mmol/L Final     Anion Gap   Date Value Ref Range Status   01/13/2022 10 5 - 18 mmol/L Final     Glucose   Date Value Ref Range Status   01/13/2022 65 (L) 70 - 125 mg/dL Final     Urea Nitrogen   Date Value Ref Range Status   01/13/2022 12 8 - 28 mg/dL Final     Creatinine   Date Value Ref Range Status   01/13/2022 0.88 0.60 - 1.10 mg/dL Final     GFR Estimate   Date Value Ref Range Status   01/13/2022 66 >60 mL/min/1.73m2 Final     Comment:     Effective December 21, 2021 eGFRcr in adults is calculated  using the 2021 CKD-EPI creatinine equation which includes age and gender (Sana et al., [place], DOI: 10.1056/WVMObw5590545)   04/26/2021 45 (L) >60 mL/min/1.73m2 Final     Calcium   Date Value Ref Range Status   01/13/2022 9.0 8.5 - 10.5 mg/dL Final         Assessment/Plan:    ICD-10-CM    1. Spinal stenosis of lumbar region with neurogenic claudication  M48.062  Continues in therapy.   2. Status post lumbar spine surgery for decompression of spinal cord  Z98.890  Continue with oxycodone and Tylenol for pain.   I will send her with 30 tabs.  She should attempt to wean off oxycodone.  She also has lidocaine patches.  Follow-up with Ortho/spine on 2/23/2022.  Follow-up CBC unremarkable.   3. Slow transit constipation  K59.01  Continue senna S1 tab twice daily.  History of diverticulitis.  Patient alternates between constipation and diarrhea.   4. Chronic diastolic (congestive) heart failure (H)  I50.32  Compensated with hydrochlorothiazide.  Weight down about 10 pounds since admit.  Edema greatly improved.   5. Chronic bronchitis, unspecified chronic bronchitis type (H)  J42  continues on Trelegy.    6. Moderate persistent asthma without complication  J45.40  See above.   7. Type 2 diabetes mellitus without complication, without long-term current use of insulin (H)  E11.9  Controlled on metformin.   8. Essential hypertension  I10  status under control on losartan.  Follow-up BMP unremarkable.   9.   Acute sinusitis   continue Nasonex.  Z-Jose take as directed.       Okay to DC home with current meds and treatments.  Home PT, OT, home health aide and RN for pain management and weaning of narcotics.  Follow-up with primary care provider in 1 week.  Follow-up with Ortho as above.    DISCHARGE PLAN/FACE TO FACE:  I certify that this patient is under my care and that I, or a nurse practitioner or physician's assistant working with me, had a face-to-face encounter that meets the physician face-to-face encounter requirements  with this patient.       I certify that, based on my findings, the following services are medically necessary home health services.    My clinical findings support the need for the above skilled services.    This patient is homebound because: Recent lumbar decompression secondary to DJD of the spine.    The patient is, or has been, under my care and I have initiated the establishment of the plan of care. This patient will be followed by a physician who will periodically review the plan of care.    35 minutes spent of which greater than 50% was face to face communication with the patient regarding pain management and attempting to wean off oxycodone, home care services, discharge medications and follow-ups.    This note has been dictated using voice recognition software. Any grammatical or context distortions are unintentional and inherent to the software    Electronically signed by: Kimberly Carrillo CNP

## 2022-01-28 ENCOUNTER — APPOINTMENT (OUTPATIENT)
Dept: CT IMAGING | Facility: HOSPITAL | Age: 82
End: 2022-01-28
Attending: EMERGENCY MEDICINE
Payer: MEDICARE

## 2022-01-28 ENCOUNTER — APPOINTMENT (OUTPATIENT)
Dept: RADIOLOGY | Facility: HOSPITAL | Age: 82
End: 2022-01-28
Payer: MEDICARE

## 2022-01-28 ENCOUNTER — HOSPITAL ENCOUNTER (EMERGENCY)
Facility: HOSPITAL | Age: 82
Discharge: HOME | End: 2022-01-28
Attending: EMERGENCY MEDICINE | Admitting: EMERGENCY MEDICINE
Payer: MEDICARE

## 2022-01-28 ENCOUNTER — APPOINTMENT (OUTPATIENT)
Dept: MRI IMAGING | Facility: HOSPITAL | Age: 82
End: 2022-01-28
Payer: MEDICARE

## 2022-01-28 VITALS
WEIGHT: 191 LBS | OXYGEN SATURATION: 95 % | HEART RATE: 81 BPM | RESPIRATION RATE: 20 BRPM | DIASTOLIC BLOOD PRESSURE: 73 MMHG | BODY MASS INDEX: 30.83 KG/M2 | SYSTOLIC BLOOD PRESSURE: 157 MMHG | TEMPERATURE: 97.7 F

## 2022-01-28 DIAGNOSIS — R06.02 SHORTNESS OF BREATH: ICD-10-CM

## 2022-01-28 DIAGNOSIS — R20.2 PARESTHESIAS: ICD-10-CM

## 2022-01-28 DIAGNOSIS — F41.9 ANXIETY: ICD-10-CM

## 2022-01-28 LAB
ANION GAP SERPL CALCULATED.3IONS-SCNC: 11 MMOL/L (ref 5–18)
APTT PPP: 30 SECONDS (ref 22–38)
BUN SERPL-MCNC: 15 MG/DL (ref 8–28)
CALCIUM SERPL-MCNC: 9.6 MG/DL (ref 8.5–10.5)
CHLORIDE BLD-SCNC: 104 MMOL/L (ref 98–107)
CO2 SERPL-SCNC: 23 MMOL/L (ref 22–31)
CREAT BLD-MCNC: 0.9 MG/DL (ref 0.6–1.1)
CREAT SERPL-MCNC: 0.89 MG/DL (ref 0.6–1.1)
ERYTHROCYTE [DISTWIDTH] IN BLOOD BY AUTOMATED COUNT: 13.4 % (ref 10–15)
GFR SERPL CREATININE-BSD FRML MDRD: 66 ML/MIN/1.73M2
GFR SERPL CREATININE-BSD FRML MDRD: >60 ML/MIN/1.73M2
GLUCOSE BLD-MCNC: 129 MG/DL (ref 70–125)
GLUCOSE BLDC GLUCOMTR-MCNC: 130 MG/DL (ref 70–99)
HCT VFR BLD AUTO: 36.4 % (ref 35–47)
HGB BLD-MCNC: 12.1 G/DL (ref 11.7–15.7)
HOLD SPECIMEN: NORMAL
INR PPP: 1.01 (ref 0.9–1.15)
MCH RBC QN AUTO: 30.2 PG (ref 26.5–33)
MCHC RBC AUTO-ENTMCNC: 33.2 G/DL (ref 31.5–36.5)
MCV RBC AUTO: 91 FL (ref 78–100)
PLATELET # BLD AUTO: 356 10E3/UL (ref 150–450)
POTASSIUM BLD-SCNC: 3.2 MMOL/L (ref 3.5–5)
RBC # BLD AUTO: 4.01 10E6/UL (ref 3.8–5.2)
SODIUM SERPL-SCNC: 138 MMOL/L (ref 136–145)
TROPONIN I SERPL-MCNC: 0.01 NG/ML (ref 0–0.29)
TROPONIN I SERPL-MCNC: 0.03 NG/ML (ref 0–0.29)
WBC # BLD AUTO: 9.9 10E3/UL (ref 4–11)

## 2022-01-28 PROCEDURE — 85610 PROTHROMBIN TIME: CPT | Performed by: EMERGENCY MEDICINE

## 2022-01-28 PROCEDURE — 82565 ASSAY OF CREATININE: CPT | Mod: 91

## 2022-01-28 PROCEDURE — 250N000011 HC RX IP 250 OP 636: Performed by: PHYSICIAN ASSISTANT

## 2022-01-28 PROCEDURE — 99207 PR NO BILLABLE SERVICE THIS VISIT: CPT | Performed by: PSYCHIATRY & NEUROLOGY

## 2022-01-28 PROCEDURE — 85027 COMPLETE CBC AUTOMATED: CPT | Performed by: EMERGENCY MEDICINE

## 2022-01-28 PROCEDURE — 84484 ASSAY OF TROPONIN QUANT: CPT | Performed by: PHYSICIAN ASSISTANT

## 2022-01-28 PROCEDURE — 36415 COLL VENOUS BLD VENIPUNCTURE: CPT | Performed by: EMERGENCY MEDICINE

## 2022-01-28 PROCEDURE — 250N000011 HC RX IP 250 OP 636: Performed by: EMERGENCY MEDICINE

## 2022-01-28 PROCEDURE — 99285 EMERGENCY DEPT VISIT HI MDM: CPT | Mod: 25

## 2022-01-28 PROCEDURE — 255N000002 HC RX 255 OP 636: Performed by: EMERGENCY MEDICINE

## 2022-01-28 PROCEDURE — 70498 CT ANGIOGRAPHY NECK: CPT

## 2022-01-28 PROCEDURE — 85730 THROMBOPLASTIN TIME PARTIAL: CPT | Performed by: EMERGENCY MEDICINE

## 2022-01-28 PROCEDURE — 70496 CT ANGIOGRAPHY HEAD: CPT

## 2022-01-28 PROCEDURE — 93005 ELECTROCARDIOGRAM TRACING: CPT | Performed by: EMERGENCY MEDICINE

## 2022-01-28 PROCEDURE — 96374 THER/PROPH/DIAG INJ IV PUSH: CPT | Mod: 59

## 2022-01-28 PROCEDURE — A9585 GADOBUTROL INJECTION: HCPCS | Performed by: EMERGENCY MEDICINE

## 2022-01-28 PROCEDURE — 71046 X-RAY EXAM CHEST 2 VIEWS: CPT

## 2022-01-28 PROCEDURE — 82310 ASSAY OF CALCIUM: CPT | Performed by: EMERGENCY MEDICINE

## 2022-01-28 PROCEDURE — 84484 ASSAY OF TROPONIN QUANT: CPT | Performed by: EMERGENCY MEDICINE

## 2022-01-28 PROCEDURE — 93005 ELECTROCARDIOGRAM TRACING: CPT | Performed by: PHYSICIAN ASSISTANT

## 2022-01-28 PROCEDURE — 36415 COLL VENOUS BLD VENIPUNCTURE: CPT | Performed by: PHYSICIAN ASSISTANT

## 2022-01-28 PROCEDURE — 70553 MRI BRAIN STEM W/O & W/DYE: CPT

## 2022-01-28 RX ORDER — GADOBUTROL 604.72 MG/ML
9 INJECTION INTRAVENOUS ONCE
Status: COMPLETED | OUTPATIENT
Start: 2022-01-28 | End: 2022-01-28

## 2022-01-28 RX ORDER — IOPAMIDOL 755 MG/ML
75 INJECTION, SOLUTION INTRAVASCULAR ONCE
Status: COMPLETED | OUTPATIENT
Start: 2022-01-28 | End: 2022-01-28

## 2022-01-28 RX ORDER — HYDROXYZINE PAMOATE 25 MG/1
25 CAPSULE ORAL 3 TIMES DAILY PRN
Qty: 5 CAPSULE | Refills: 0 | Status: SHIPPED | OUTPATIENT
Start: 2022-01-28 | End: 2022-11-04

## 2022-01-28 RX ORDER — LORAZEPAM 2 MG/ML
1 INJECTION INTRAMUSCULAR ONCE
Status: COMPLETED | OUTPATIENT
Start: 2022-01-28 | End: 2022-01-28

## 2022-01-28 RX ADMIN — GADOBUTROL 9 ML: 604.72 INJECTION INTRAVENOUS at 21:07

## 2022-01-28 RX ADMIN — LORAZEPAM 1 MG: 2 INJECTION INTRAMUSCULAR; INTRAVENOUS at 20:19

## 2022-01-28 RX ADMIN — IOPAMIDOL 75 ML: 755 INJECTION, SOLUTION INTRAVENOUS at 18:59

## 2022-01-28 ASSESSMENT — ENCOUNTER SYMPTOMS
NUMBNESS: 1
WEAKNESS: 1
SINUS PRESSURE: 1

## 2022-01-29 LAB
ATRIAL RATE - MUSE: 72 BPM
ATRIAL RATE - MUSE: 79 BPM
DIASTOLIC BLOOD PRESSURE - MUSE: 77 MMHG
DIASTOLIC BLOOD PRESSURE - MUSE: NORMAL MMHG
INTERPRETATION ECG - MUSE: NORMAL
INTERPRETATION ECG - MUSE: NORMAL
P AXIS - MUSE: 52 DEGREES
P AXIS - MUSE: 74 DEGREES
PR INTERVAL - MUSE: 186 MS
PR INTERVAL - MUSE: 192 MS
QRS DURATION - MUSE: 102 MS
QRS DURATION - MUSE: 98 MS
QT - MUSE: 404 MS
QT - MUSE: 404 MS
QTC - MUSE: 442 MS
QTC - MUSE: 463 MS
R AXIS - MUSE: -51 DEGREES
R AXIS - MUSE: -56 DEGREES
SYSTOLIC BLOOD PRESSURE - MUSE: 183 MMHG
SYSTOLIC BLOOD PRESSURE - MUSE: NORMAL MMHG
T AXIS - MUSE: 60 DEGREES
T AXIS - MUSE: 67 DEGREES
VENTRICULAR RATE- MUSE: 72 BPM
VENTRICULAR RATE- MUSE: 79 BPM

## 2022-01-29 NOTE — CONSULTS
"    [facility]    Stroke Telephone Note    I was called by  on 01/28/22 regarding patient Katt Dsouza. The patient is a 81 year old female presents with left sided arm and leg numbness and tingling. She had recent spinal fusion surgery.     Stroke Code Data (for stroke code without tele)  Stroke code activated 01/28/22   1820   Stroke provider first response           01/28/22   1822   Last known normal 01/28/22   1400        Time of discovery   (or onset of symptoms) 01/28/22   1400   Head CT read by Stroke Neuro Dr/Provider 01/28/22   1845   Was stroke code de-escalated? Yes 01/28/22 1905  patient is outside emergent treatment time parameters       Imaging Findings   CT head neg for acute abnormalities  CTA neg for LVO or stenosis    Thrombolytic Treatment   Not given due to unclear or unfavorable risk-benefit profile for extended window thrombolysis beyond the conventional 4.5 hour time window.    Endovascular Treatment  Not initiated due to absence of proximal vessel occlusion    Impression  L sided numbness      Recommendations   MRI brain with and without to assess for stroke  If stroke on MRI pt will need admission for stroke work up and evaluation    My recommendations are based on the information provided over the phone by Katt Dsouza's in-person providers. They are not intended to replace the clinical judgment of her in-person providers. I was not requested to personally see or examine the patient at this time.    Praveena Zuniga MD  Vascular Neurology  To page me or covering stroke neurology team member, click here: [facility]   Choose \"On Call\" tab at top, then search dropdown box for \"Neurology Adult\", select location, press Enter, then look for stroke/neuro ICU/telestroke.           "

## 2022-01-29 NOTE — DISCHARGE INSTRUCTIONS
Continue with your at home pain management.  Continue antibiotics as previously prescribed for your sinusitis.   Use hydroxyzine as needed for anxiety.    Follow up in clinic early next week for re-check.  Return to the emergency department if you develop worsening chest pain, shortness of breath, weakness, headache, confusion, or any other concerning symptoms. We would be happy to see you.

## 2022-01-29 NOTE — ED PROVIDER NOTES
EMERGENCY DEPARTMENT ENCOUNTER      NAME: Katt Dsouza  AGE: 81 year old female  YOB: 1940  MRN: [id number]  EVALUATION DATE & TIME: 2022  6:21 PM    PCP: Heidy Strickland    ED PROVIDER: Krystle Barnett PA-C      Chief Complaint   Patient presents with     Tier 2 Stroke Code         FINAL IMPRESSION:  1. Shortness of breath    2. Paresthesias    3. Anxiety          ED COURSE & MEDICAL DECISION MAKIN:16 PM I introduced myself to patient, performed initial HPI and examination. PPE: N95 mask, protective eyewear  6:19 PM Tier 2 stroke code called.  6:24 PM Discussed patient's case with [facility] stroke neurology.  6:27 PM Patient staffed with Dr. Sloan  6:55 PM Dr. Sloan spoke with radiology. Negative CT.  6:58 PM Dr. Sloan spoke with radiology. IV infiltrated.   7:05 PM I spoke with and updated stroke neurology. Recommends MRI. Will deescalate stroke code.  10:18 PM I re-evaluated and updated patient. She feels improved. Patient states she did not take her night time blood pressure medications. We discussed plans for discharge and patient is agreeable.    81 year old female with PMH COPD, chronic low back pain with L4-5 transformanial lumbar interbody fusion with posterior instrumentation and L3-5 bilateral medial facetectomy and decompression  at [facility], type 2 diabetes, aortic stenosis, CHF presents to the Emergency Department for evaluation of jaw pain, left arm pain, paresthesias to the left side that started 4 hours 20 minutes PTA. Denies new weakness, does report bilateral leg weakness with recent back surgery. Currently being treated for sinusitis.    Vital signs notable for hypertension (169/71), otherwise unremarkable.  On exam, patient does report paresthesias to the left side, very mild weakness to the left  when compared to right. Lower extremity weakness bilaterally. No drift. No neglect. No gaze or vision changes. No facial symmetry.    Given patient's  symptoms, Tier 2 stroke code was called. CT negative. Neurology consulted, stroke code downgraded and MRI obtained. MRI shows no acute abnormalities. Of note, patient's symptoms did improve during ED stay.    With jaw pain, chest pain, EKG obtained and ultimately negative. Troponin negative.   Additional labs with no hypoglycemia. CBC with no leukocytosis, no anemia. Potassium 3.2, otherwise no electrolyte derangement and creatinine WNL. INR 1.01.   CXR consistent with COPD, otherwise negative.  Delta troponin and EKG unchanged. Symptoms improved.    Presentation is most suspicious for anxiety/panic attack. No evidence of catastrophic etiology. Not consistent with post-operative copmlication. No hypoxia or other symptoms to suggest post-procedure thromboembolic event/pulmonary embolism. No indication for admission at this time. Instructed on at home management, close follow up with PCP and instructed on red flags/indications to return to the emergency department. All questions were answered to the best of my ability and patient is agreeable with plan.         MEDICATIONS GIVEN IN THE EMERGENCY:  Medications   iopamidol (ISOVUE-370) solution 75 mL (75 mLs Intravenous Given 1/28/22 1859)   LORazepam (ATIVAN) injection 1 mg (1 mg Intravenous Given 1/28/22 2019)   gadobutrol (GADAVIST) injection 9 mL (9 mLs Intravenous Given 1/28/22 2107)       NEW PRESCRIPTIONS STARTED AT TODAY'S ER VISIT  Discharge Medication List as of 1/28/2022 10:33 PM      START taking these medications    Details   hydrOXYzine (VISTARIL) 25 MG capsule Take 1 capsule (25 mg) by mouth 3 times daily as needed for anxiety, Disp-5 capsule, R-0, E-Prescribe               =================================================================    HPI    Patient information was obtained from: Patient    Use of : N/A         Katt Dsouza is a 81 year old female with a pertinent history of CHF, DM II, COPD, s/p spinal fusion lumbar (1/4/22), HTN, who  presents to this ED via EMS for evaluation of numbness and tingling.    Patient repots at 1400 today she developed numbness, tingling, and weakness to left upper and lower extremities while watching TV. She denies any history of similar symptoms.     Of note, patient was diagnosed with a sinus infection 3 days ago was prescribed a Zpack. She was previously taking Nasonex without improvement. She endorses pain to left face and left jaw that has been worsening.    Patient recently had a spinal fusion on 1/4/22. She currently is taking oxycodone for her back. Denies any blood thinners. Patient denies additional medical concerns or complaints at this time.     REVIEW OF SYSTEMS   Review of Systems   HENT: Positive for sinus pressure.         Positive for left face and jaw pain.   Neurological: Positive for weakness (left upper and lower extremities) and numbness (left upper and lower extremity).        Positive for tingling to left upper and lower extremities.   All other systems reviewed and are negative.     PAST MEDICAL HISTORY:  History reviewed. No pertinent past medical history.    PAST SURGICAL HISTORY:  History reviewed. No pertinent surgical history.    CURRENT MEDICATIONS:    hydrOXYzine (VISTARIL) 25 MG capsule  acetaminophen (TYLENOL) 500 MG tablet  acetaminophen (TYLENOL) 500 MG tablet  albuterol (PROAIR HFA/PROVENTIL HFA/VENTOLIN HFA) 108 (90 Base) MCG/ACT inhaler  allopurinoL (ZYLOPRIM) 100 MG tablet  amLODIPine (NORVASC) 5 MG tablet  atorvastatin (LIPITOR) 20 MG tablet  famotidine (PEPCID) 20 MG tablet  gabapentin (NEURONTIN) 300 MG capsule  hydrochlorothiazide (HYDRODIURIL) 25 MG tablet  losartan (COZAAR) 100 MG tablet  metFORMIN (GLUCOPHAGE) 500 MG tablet  methocarbamol (ROBAXIN) 500 MG tablet  mometasone (NASONEX) 50 MCG/ACT nasal spray  oxyCODONE (ROXICODONE) 5 MG tablet  senna-docusate (SENOKOT-S/PERICOLACE) 8.6-50 MG tablet  simethicone (MYLICON) 80 MG chewable tablet  TRELEGY ELLIPTA 100-62.5-25  MCG/INH oral inhaler  ZITHROMAX Z-ANDREW 250 MG tablet  zolpidem (AMBIEN) 10 MG tablet        ALLERGIES:  Allergies   Allergen Reactions     Amoxicillin      Other reaction(s): hives     Penicillin G      Penicillin V      Other reaction(s): hives     Penicillins Hives     Sulfa Drugs Hives       FAMILY HISTORY:  History reviewed. No pertinent family history.    SOCIAL HISTORY:   Social History     Socioeconomic History     Marital status:      Spouse name: Not on file     Number of children: Not on file     Years of education: Not on file     Highest education level: Not on file   Occupational History     Not on file   Tobacco Use     Smoking status: Former Smoker     Quit date: 1995     Years since quittin.0     Smokeless tobacco: Never Used   Substance and Sexual Activity     Alcohol use: Not on file     Drug use: Not on file     Sexual activity: Not on file   Other Topics Concern     Not on file   Social History Narrative     Not on file     Social Determinants of Health     Financial Resource Strain: Not on file   Food Insecurity: Not on file   Transportation Needs: Not on file   Physical Activity: Not on file   Stress: Not on file   Social Connections: Not on file   Intimate Partner Violence: Not on file   Housing Stability: Not on file       VITALS:  BP (!) 157/73   Pulse 81   Temp 97.7  F (36.5  C) (Oral)   Resp 20   Wt 86.6 kg (191 lb)   SpO2 95%   BMI 30.83 kg/m      PHYSICAL EXAM    Constitutional: Well developed, Well nourished, NAD, GCS 15   HENT: Normocephalic, Atraumatic, Oropharynx clear.   Neck- Supple, Nontender. Normal ROM. No stridor.   Eyes: Conjunctiva normal. PERRL. EOM intact.   Respiratory: No respiratory distress, speaking in full sentences. Normal breath sounds, No wheezing  Cardiovascular: Normal heart rate, Regular rhythm, No murmurs. Chest wall nontender.    GI: Soft, nontender. No distention or masses.   Musculoskeletal: No deformities, Unable to keep legs raised on  her own bilaterally.  strength 4/5 on left, 5/5 on right but other upper extremity testing equal.   Integument: Warm, Dry, No erythema, ecchymosis, or rash.   Neurologic: Alert & oriented x 3, Altered sensation/paresthesias to left arm and leg, otherwise no deficits. No pronator drift, no facial droop, no gaze or visual field defects. No neglect.  Psychiatric: Affect normal, Judgment normal, Mood normal. Cooperative.      LAB:  All pertinent labs reviewed and interpreted.  Results for orders placed or performed during the hospital encounter of 01/28/22   CTA Head Neck with Contrast    Impression    IMPRESSION:   HEAD CT:  1.  No acute intracranial abnormalities identified.    HEAD CTA:   1.  No vascular occlusion or high-grade stenosis.    NECK CTA:  1.  No high-grade stenosis or dissection.    Report called to Dr. Sloan, [facility] emergency department, on noncontrast head CT at 1850 hours.  Report called to Dr. Sloan, [facility] emergency department, on CT angiogram at 1910 hours.   MR Brain w/o & w Contrast    Impression    IMPRESSION:  1.  Motion degraded exam. No acute intracranial process.  2.  Generalized brain atrophy and presumed microvascular ischemic changes as detailed above.   Chest XR,  PA & LAT    Impression    IMPRESSION: No change. Heart size upper limits of normal. Lungs hyperinflated suggesting COPD. No focal pneumonia.   CBC with platelets   Result Value Ref Range    WBC Count 9.9 4.0 - 11.0 10e3/uL    RBC Count 4.01 3.80 - 5.20 10e6/uL    Hemoglobin 12.1 11.7 - 15.7 g/dL    Hematocrit 36.4 35.0 - 47.0 %    MCV 91 78 - 100 fL    MCH 30.2 26.5 - 33.0 pg    MCHC 33.2 31.5 - 36.5 g/dL    RDW 13.4 10.0 - 15.0 %    Platelet Count 356 150 - 450 10e3/uL   Partial thromboplastin time   Result Value Ref Range    aPTT 30 22 - 38 Seconds   Result Value Ref Range    INR 1.01 0.90 - 1.15   Basic metabolic panel   Result Value Ref Range    Sodium 138 136 - 145 mmol/L    Potassium 3.2 (L) 3.5 - 5.0 mmol/L     Chloride 104 98 - 107 mmol/L    Carbon Dioxide (CO2) 23 22 - 31 mmol/L    Anion Gap 11 5 - 18 mmol/L    Urea Nitrogen 15 8 - 28 mg/dL    Creatinine 0.89 0.60 - 1.10 mg/dL    Calcium 9.6 8.5 - 10.5 mg/dL    Glucose 129 (H) 70 - 125 mg/dL    GFR Estimate 66 >60 mL/min/1.73m2   Result Value Ref Range    Troponin I 0.01 0.00 - 0.29 ng/mL   Extra Red Top Tube   Result Value Ref Range    Hold Specimen JIC    Glucose by meter   Result Value Ref Range    GLUCOSE BY METER POCT 130 (H) 70 - 99 mg/dL   Creatinine POCT   Result Value Ref Range    Creatinine POCT 0.9 0.6 - 1.1 mg/dL    GFR, ESTIMATED POCT >60 >60 mL/min/1.73m2   Troponin I (now)   Result Value Ref Range    Troponin I 0.03 0.00 - 0.29 ng/mL       RADIOLOGY:  Reviewed all pertinent imaging. Please see official radiology report.  MR Brain w/o & w Contrast   Final Result   IMPRESSION:   1.  Motion degraded exam. No acute intracranial process.   2.  Generalized brain atrophy and presumed microvascular ischemic changes as detailed above.      Chest XR,  PA & LAT   Final Result   IMPRESSION: No change. Heart size upper limits of normal. Lungs hyperinflated suggesting COPD. No focal pneumonia.      CTA Head Neck with Contrast   Final Result   IMPRESSION:    HEAD CT:   1.  No acute intracranial abnormalities identified.      HEAD CTA:    1.  No vascular occlusion or high-grade stenosis.      NECK CTA:   1.  No high-grade stenosis or dissection.      Report called to Dr. Sloan, [facility] emergency department, on noncontrast head CT at 1850 hours.   Report called to Dr. Sloan, [facility] emergency department, on CT angiogram at 1910 hours.          EKG:    Performed at: 18:26:06    Impression: Sinus rhythm, Left anterior fascicular block, nonspecific ST abnormality. No STEMI.     Rate: 72 BPM  MN Interval: 186 ms  QRS Interval: 98 ms  QTc Interval: 404/442 ms  ST Changes: None  Comparison: When compared with ECG of 05-Jul-2019 10:11, Premature atrial complexes are no  longer present    Dr. Sloan and I have independently reviewed and interpreted the EKG(s) documented above.    PROCEDURES:   None      I, Rachel Thompsonedorn, am serving as a scribe to document services personally performed by Krystle Barnett, based on my observation and the provider's statements to me. I, Krystle Barnett, attest that Rachel Freeman is acting in a scribe capacity, has observed my performance of the services and has documented them in accordance with my direction.    Krystle Barnett PA-C  Emergency Medicine  [facility] EMERGENCY DEPARTMENT  [facility] [facility] [address]  [phone]           Krystle Barnett PA-C  01/28/22 6656

## 2022-01-29 NOTE — ED TRIAGE NOTES
Pt reports left sided numbness and tingling in L arm and leg. Slightly decreased  strength in left arm. Tier 2 stroke code called and provider was at bedside. Symptoms started today at 1400. Pt also states she has had a sinus infection for the last three days. Left sided headache and left sided jaw & arm pain, as well as shortness of breath, also started today at 1400. STAT EKG also performed. Pt had spinal surgery on 1/4/22. She was at a TCU until two days ago.

## 2022-01-29 NOTE — ED PROVIDER NOTES
I, Eusebia Sloan MD have reviewed the documentation, personally taken the patient's history, performed an exam and agree with the physical finds, diagnosis and management plan.  I saw this patient with Krystle Barnett PA-C.  ED Course as of 01/28/22 2138 Fri Jan 28, 2022 2101 Patient is 81-year-old female who comes in for evaluation of numbness and tingling of left arm and left leg.  She reported the symptoms started around 2 PM.  She was activated as a stroke code.  Neurology was involved and patient was sent down for CT scans.  CT and CTA were unremarkable.  Right now the current plan is to get an MRI and if that comes back okay she can discharge home.  I tried to go see the patient a couple of times.  The first time she was on the phone and the second time she had already gone down for MRI.  I will see her shortly and we can talk more about the plan.   2134 Patient is a pleasant 81-year-old female who is been having some numbness and weakness to her left leg for a few weeks.  She had an L4-L5 fusion 3 weeks ago and thinks it started after that.  She is also been noticing some numbness and weakness to her left arm but thinks that could be from bursitis.  That is been going on for a few weeks as well.  She feels like all of her symptoms have improved and she got here.  It sounds like she likely had a little bit of an anxiety attack when she first came in.  She been having some jaw pain from a sinus infection and was concerned about that.  She is doing much better now.  It looks like her MRI just came back and was unremarkable.  She feels fine with the plan for discharge home.  We can send her with some Vistaril to take as needed as needed for anxiety over the weekend and I told her was really important to follow with her primary care physician.  Because of the shortness of breath and this jaw discomfort we are also going to get a repeat troponin and EKG.  I discussed this with the patient as well.   She is in agreement.  She is due for it right now and if that looks okay then she can go home.  She is in agreement with the plan.       I personally saw the patient and performed a substantive portion of the visit including all aspects of the medical decision making.     Eusebia Sloan MD  01/28/22 2136       Eusebia Sloan MD  01/28/22 2138

## 2022-01-29 NOTE — ED NOTES
Bed: JNED-06  Expected date: 1/28/22  Expected time: 6:10 PM  Means of arrival:   Comments:  Ambulance- SOB/ Jaw pain

## 2022-02-23 ENCOUNTER — LAB REQUISITION (OUTPATIENT)
Dept: LAB | Facility: CLINIC | Age: 82
End: 2022-02-23
Payer: MEDICARE

## 2022-02-23 DIAGNOSIS — Z01.812 ENCOUNTER FOR PREPROCEDURAL LABORATORY EXAMINATION: ICD-10-CM

## 2022-02-23 DIAGNOSIS — G50.0 TRIGEMINAL NEURALGIA: ICD-10-CM

## 2022-02-23 DIAGNOSIS — M1A.9XX0 CHRONIC GOUT, UNSPECIFIED, WITHOUT TOPHUS (TOPHI): ICD-10-CM

## 2022-02-23 LAB
C REACTIVE PROTEIN LHE: 0.5 MG/DL (ref 0–0.8)
ERYTHROCYTE [SEDIMENTATION RATE] IN BLOOD BY WESTERGREN METHOD: 37 MM/HR (ref 0–20)
SARS-COV-2 RNA RESP QL NAA+PROBE: NEGATIVE
URATE SERPL-MCNC: 5.5 MG/DL (ref 2–7.5)

## 2022-02-23 PROCEDURE — U0005 INFEC AGEN DETEC AMPLI PROBE: HCPCS | Mod: ORL | Performed by: PHYSICIAN ASSISTANT

## 2022-02-23 PROCEDURE — 85652 RBC SED RATE AUTOMATED: CPT | Mod: ORL | Performed by: PHYSICIAN ASSISTANT

## 2022-02-23 PROCEDURE — 86140 C-REACTIVE PROTEIN: CPT | Mod: ORL | Performed by: PHYSICIAN ASSISTANT

## 2022-02-23 PROCEDURE — 84550 ASSAY OF BLOOD/URIC ACID: CPT | Mod: ORL | Performed by: PHYSICIAN ASSISTANT

## 2022-03-24 ENCOUNTER — TRANSFERRED RECORDS (OUTPATIENT)
Dept: HEALTH INFORMATION MANAGEMENT | Facility: CLINIC | Age: 82
End: 2022-03-24

## 2022-03-24 ENCOUNTER — LAB REQUISITION (OUTPATIENT)
Dept: LAB | Facility: CLINIC | Age: 82
End: 2022-03-24
Payer: MEDICARE

## 2022-03-24 DIAGNOSIS — J02.9 ACUTE PHARYNGITIS, UNSPECIFIED: ICD-10-CM

## 2022-03-24 PROCEDURE — 87081 CULTURE SCREEN ONLY: CPT | Mod: ORL | Performed by: PHYSICIAN ASSISTANT

## 2022-03-26 LAB — BACTERIA SPEC CULT: NORMAL

## 2022-05-17 ENCOUNTER — LAB REQUISITION (OUTPATIENT)
Dept: LAB | Facility: CLINIC | Age: 82
End: 2022-05-17
Payer: MEDICARE

## 2022-05-17 DIAGNOSIS — R60.0 LOCALIZED EDEMA: ICD-10-CM

## 2022-05-19 LAB
ALBUMIN SERPL-MCNC: 4.1 G/DL (ref 3.5–5)
ALP SERPL-CCNC: 98 U/L (ref 45–120)
ALT SERPL W P-5'-P-CCNC: 28 U/L (ref 0–45)
ANION GAP SERPL CALCULATED.3IONS-SCNC: 11 MMOL/L (ref 5–18)
AST SERPL W P-5'-P-CCNC: 25 U/L (ref 0–40)
BILIRUB SERPL-MCNC: 0.3 MG/DL (ref 0–1)
BNP SERPL-MCNC: 78 PG/ML (ref 0–151)
BUN SERPL-MCNC: 21 MG/DL (ref 8–28)
CALCIUM SERPL-MCNC: 9.5 MG/DL (ref 8.5–10.5)
CHLORIDE BLD-SCNC: 104 MMOL/L (ref 98–107)
CO2 SERPL-SCNC: 30 MMOL/L (ref 22–31)
CREAT SERPL-MCNC: 1.12 MG/DL (ref 0.6–1.1)
ERYTHROCYTE [DISTWIDTH] IN BLOOD BY AUTOMATED COUNT: 14 % (ref 10–15)
GFR SERPL CREATININE-BSD FRML MDRD: 50 ML/MIN/1.73M2
GLUCOSE BLD-MCNC: 142 MG/DL (ref 70–125)
HCT VFR BLD AUTO: 42 % (ref 35–47)
HGB BLD-MCNC: 13.7 G/DL (ref 11.7–15.7)
MCH RBC QN AUTO: 29.9 PG (ref 26.5–33)
MCHC RBC AUTO-ENTMCNC: 32.6 G/DL (ref 31.5–36.5)
MCV RBC AUTO: 92 FL (ref 78–100)
PLATELET # BLD AUTO: 299 10E3/UL (ref 150–450)
POTASSIUM BLD-SCNC: 3.4 MMOL/L (ref 3.5–5)
PROT SERPL-MCNC: 7.3 G/DL (ref 6–8)
RBC # BLD AUTO: 4.58 10E6/UL (ref 3.8–5.2)
SODIUM SERPL-SCNC: 145 MMOL/L (ref 136–145)
TSH SERPL DL<=0.005 MIU/L-ACNC: 2.55 UIU/ML (ref 0.3–5)
WBC # BLD AUTO: 9.7 10E3/UL (ref 4–11)

## 2022-05-19 PROCEDURE — 80053 COMPREHEN METABOLIC PANEL: CPT | Mod: ORL | Performed by: PHYSICIAN ASSISTANT

## 2022-05-19 PROCEDURE — 84443 ASSAY THYROID STIM HORMONE: CPT | Mod: ORL | Performed by: PHYSICIAN ASSISTANT

## 2022-05-19 PROCEDURE — 83880 ASSAY OF NATRIURETIC PEPTIDE: CPT | Mod: ORL | Performed by: PHYSICIAN ASSISTANT

## 2022-05-19 PROCEDURE — 85027 COMPLETE CBC AUTOMATED: CPT | Mod: ORL | Performed by: PHYSICIAN ASSISTANT

## 2022-06-08 ENCOUNTER — TELEPHONE (OUTPATIENT)
Dept: CARDIOLOGY | Facility: CLINIC | Age: 82
End: 2022-06-08
Payer: MEDICARE

## 2022-06-08 NOTE — TELEPHONE ENCOUNTER
Sounds good.  She should follow-up at some time because she did have some mild aortic stenosis.  Thanks    Reymundo Francisco MD

## 2022-06-08 NOTE — TELEPHONE ENCOUNTER
[facility] Call Center    Phone Message    May a detailed message be left on voicemail: yes     Reason for Call: Other: Katt called trying to make an appointment with Dr. Francisco for swelling in her ankles. Katt stated that her pcp tried water pills and compression stockings and they did not work. Dr. Francisco has no openings. Please advise. Thank you.      Action Taken: Message routed to:  Other: RICHARDSON    Travel Screening: Not Applicable

## 2022-06-08 NOTE — TELEPHONE ENCOUNTER
Discussed possible reasons for ankle swelling that keeps coming back.  Most recent BNP was normal.  PCP suggested compression stockings and diuretic, but swelling returns.  Pt is on amlodipine for BP.    Suggested pt discuss with PCP and change to something else for BP.  Suggested pt avoid dining out and processed foods - focus on fresh, and avoid sodium.  Suggested pt elevate legs periodically throughout the day.    Pt will call PCP, and will call back with any further issues.  Pt was due to be seen October 2021 and declined making appt at this time.  -jess

## 2022-06-14 ENCOUNTER — TELEPHONE (OUTPATIENT)
Dept: CARDIOLOGY | Facility: CLINIC | Age: 82
End: 2022-06-14
Payer: MEDICARE

## 2022-06-14 RX ORDER — AMLODIPINE BESYLATE 10 MG/1
5 TABLET ORAL DAILY
COMMUNITY
Start: 2022-04-10 | End: 2024-05-01

## 2022-06-14 NOTE — TELEPHONE ENCOUNTER
[facility] Call Center    Phone Message    May a detailed message be left on voicemail: yes     Reason for Call: Medication Question or concern regarding medication   Prescription Clarification  Name of Medication: amlodipine  10 mg dosage    Prescribing Provider: Pt PCP   Pharmacy: [id number] - [place] [address]   What on the order needs clarification?   Pt's PCP prescribed amlodipine  10 mg but pt is having negative reaction to is, swelling ankles.  She asked her PCP for a different med and he said her Cardio has to prescribe that as he doesn't know of anything else that can be substituted for it.  Pt has tried water pills and compression stockings but nothing has worked.  Pt has an appt scheduled to see Dr. Francisco in August but needs something else now.  Please contact pt for more details and advise what script she can take in place of the Amlodopine    Action Taken: Message routed to:  [facility] cardio    Travel Screening: Not Applicable

## 2022-06-15 NOTE — TELEPHONE ENCOUNTER
Recommendations discussed with pt.  Pt verbalized understanding and will return to PCP for further discussion.  Pt is scheduled with [facility] 8/20/22, and added to wait list.  -rah    ===View-only below this line===  ----- Message -----  From: Reymundo Francisco MD  Sent: 6/14/2022   6:12 PM CDT  To: Farhana Bob RN  Subject: RE:                                              I've not seen her in over a year. How about chime appt ? Does that work ? Mdg

## 2022-06-27 ENCOUNTER — TRANSFERRED RECORDS (OUTPATIENT)
Dept: HEALTH INFORMATION MANAGEMENT | Facility: CLINIC | Age: 82
End: 2022-06-27

## 2022-07-02 ENCOUNTER — HEALTH MAINTENANCE LETTER (OUTPATIENT)
Age: 82
End: 2022-07-02

## 2022-08-02 PROBLEM — I13.0 HYPERTENSIVE HEART DISEASE WITH CONGESTIVE HEART FAILURE AND CHRONIC KIDNEY DISEASE (H): Status: ACTIVE | Noted: 2022-08-02

## 2022-08-02 PROBLEM — H35.349 DEGENERATION OF MACULA DUE TO CYST, HOLE OR PSEUDOHOLE: Status: ACTIVE | Noted: 2022-08-02

## 2022-08-02 PROBLEM — E11.21 DIABETIC RENAL DISEASE (H): Status: ACTIVE | Noted: 2022-08-02

## 2022-08-02 PROBLEM — N18.31 CHRONIC KIDNEY DISEASE, STAGE 3A (H): Status: ACTIVE | Noted: 2022-08-02

## 2022-08-02 NOTE — PROGRESS NOTES
"Katt is a 82 year old who is being evaluated via a billable video visit.      How would you like to obtain your AVS? MyChart  If the video visit is dropped, the invitation should be resent by: Text to cell phone: [phone]  Will anyone else be joining your video visit? No        Video-Visit Details    Video Start Time: 8:30 AM    Type of service:  Video Visit    Video End Time:8:41 AM    Originating Location (pt. Location): Home    Distant Location (provider location):  [facility]     Platform used for Video Visit: [facility]     Pulmonary Clinic Outpatient Follow-Up -- VIDEO  8/12/2022     Assessment and Plan:   #. Asthma exacerbation, mild.  #. Asthma, moderate persistent, doing well on Trelegy.  #. Rhinitis, recurrent sinus congestion.      We will do a 7-day burst of prednisone 40 mg daily (or equivalent) plus a Z-Jose course    Renew Trelegy for the next year, renew albuterol as needed    Action Plan -- prednisone 40 mg daily x 7 days with Zpack. May repeat x1 (substitube doxycycline instead of Z-pack), but if fails 2 action plan activations back-to-back, will need to go the the ED.    RTC: 12 months    Melvina Sommers MD  Pulmonary and Critical Care   ______________________________________________________________________________    CC: follow up    HPI:   Katt Dsouza is a 82 year old former smoker with history of asthma, diastolic heart failure, obesity, presenting today for follow up of her asthma.  Most recently seen 8/12/2021; continued on Trelegy.    Reports doing well on Trelegy, although it is quite expensive. She has been more short of breath this week for an unclear reason; had to use her albuterol inhaler. Her symptoms have been stable over the last week. No fevers or chills. Cough is non-productive but sounds a bit \"juicy\". No wheezing. No new symptoms of sinus congestion. Not taking anything for her allergies.     REVIEW OF SYSTEMS: 10-point ROS was negative with exceptions as " detailed in the HPI.    Physical Exam:  Gen: alert, oriented, no distress  HEENT: EOMI; no stridor  CV: deferred  Resp: talking in full sentences w/ mild respiratory distress, intermittent cough  Abd: deferred  Skin: no apparent rashes  Ext: deferred  Neuro: alert, nonfocal    Labs: personally reviewed & interpreted in EMR.   Imaging & Procedures: personally reviewed & interpreted, including formal Radiology reports in the EMR.    MEDICAL HISTORY: Type 2 diabetes mellitus, anxiety, diverticulosis, hypertension, GERD, moderate persistent asthma.  SURGICAL HISTORY: Cholecystectomy, appendectomy, left carpal tunnel surgery, multiple spinal surgeries.  SOCIAL HISTORY:  reports that she quit smoking about 26 years ago. She has never used smokeless tobacco. 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 Pertinent information noted & updated.   ALLERGIES:   Allergies   Allergen Reactions     Amoxicillin      Other reaction(s): hives     Penicillin G      Penicillin V      Other reaction(s): hives     Penicillins Hives     Sulfa Drugs Hives

## 2022-08-12 ENCOUNTER — VIRTUAL VISIT (OUTPATIENT)
Dept: PULMONOLOGY | Facility: OTHER | Age: 82
End: 2022-08-12
Payer: MEDICARE

## 2022-08-12 DIAGNOSIS — J45.41 MODERATE PERSISTENT ASTHMA WITH EXACERBATION: Primary | ICD-10-CM

## 2022-08-12 DIAGNOSIS — J45.40 MODERATE PERSISTENT ASTHMA WITHOUT COMPLICATION: ICD-10-CM

## 2022-08-12 PROCEDURE — 99213 OFFICE O/P EST LOW 20 MIN: CPT | Mod: 95 | Performed by: INTERNAL MEDICINE

## 2022-08-12 RX ORDER — AZITHROMYCIN 250 MG/1
TABLET, FILM COATED ORAL
Qty: 6 TABLET | Refills: 0 | Status: SHIPPED | OUTPATIENT
Start: 2022-08-12 | End: 2022-08-17

## 2022-08-12 RX ORDER — PREDNISONE 20 MG/1
40 TABLET ORAL DAILY
Qty: 14 TABLET | Refills: 0 | Status: SHIPPED | OUTPATIENT
Start: 2022-08-12 | End: 2022-08-19

## 2022-08-12 RX ORDER — ALBUTEROL SULFATE 90 UG/1
2 AEROSOL, METERED RESPIRATORY (INHALATION) EVERY 4 HOURS PRN
Qty: 8.5 G | Refills: 1 | Status: SHIPPED | OUTPATIENT
Start: 2022-08-12 | End: 2023-11-07

## 2022-08-12 NOTE — PATIENT INSTRUCTIONS
Plan:  Sounds that you are having a mild asthma exacerbation.  Since you have not been well for a week, it is very reasonable to treat it to prevent it from getting worse.  I want you to take prednisone 40 mg daily for 7 days (since your surgeon is prescribing prednisone as well, you can go ahead and take the dose that s/he prescribes and take it for 9 days)  In addition to prednisone, I would want you to take a course of a Z-Jose (I sent the prescription to your pharmacy)  If you end up not taking the prednisone I have sent to your pharmacy, keep that prednisone on-hand in case you have another asthma flare  Continue with your daily Trelegy -- I am so glad that it is working for you    You should follow up in 12 months or earlier if you have any issues.     If you have any questions or concerns, please, call our clinic at [phone].

## 2022-08-21 DIAGNOSIS — J45.40 MODERATE PERSISTENT ASTHMA WITHOUT COMPLICATION: ICD-10-CM

## 2022-08-22 RX ORDER — FLUTICASONE PROPIONATE 50 MCG
2 SPRAY, SUSPENSION (ML) NASAL 2 TIMES DAILY
Qty: 16 G | Refills: 0 | Status: SHIPPED | OUTPATIENT
Start: 2022-08-22 | End: 2023-01-09

## 2022-08-27 ENCOUNTER — HEALTH MAINTENANCE LETTER (OUTPATIENT)
Age: 82
End: 2022-08-27

## 2022-10-03 ENCOUNTER — TRANSFERRED RECORDS (OUTPATIENT)
Dept: HEALTH INFORMATION MANAGEMENT | Facility: CLINIC | Age: 82
End: 2022-10-03

## 2022-10-03 ENCOUNTER — LAB REQUISITION (OUTPATIENT)
Dept: LAB | Facility: CLINIC | Age: 82
End: 2022-10-03
Payer: MEDICARE

## 2022-10-03 DIAGNOSIS — E78.2 MIXED HYPERLIPIDEMIA: ICD-10-CM

## 2022-10-03 LAB
CHOLEST SERPL-MCNC: 143 MG/DL
HDLC SERPL-MCNC: 39 MG/DL
LDLC SERPL CALC-MCNC: 80 MG/DL
NONHDLC SERPL-MCNC: 104 MG/DL
TRIGL SERPL-MCNC: 122 MG/DL

## 2022-10-03 PROCEDURE — 80061 LIPID PANEL: CPT | Mod: ORL | Performed by: PHYSICIAN ASSISTANT

## 2022-10-28 ENCOUNTER — LAB REQUISITION (OUTPATIENT)
Dept: LAB | Facility: CLINIC | Age: 82
End: 2022-10-28
Payer: MEDICARE

## 2022-10-28 DIAGNOSIS — I13.0 HYPERTENSIVE HEART AND CHRONIC KIDNEY DISEASE WITH HEART FAILURE AND STAGE 1 THROUGH STAGE 4 CHRONIC KIDNEY DISEASE, OR UNSPECIFIED CHRONIC KIDNEY DISEASE (H): ICD-10-CM

## 2022-10-28 LAB
ANION GAP SERPL CALCULATED.3IONS-SCNC: 12 MMOL/L (ref 7–15)
BUN SERPL-MCNC: 25.7 MG/DL (ref 8–23)
CALCIUM SERPL-MCNC: 9.7 MG/DL (ref 8.8–10.2)
CHLORIDE SERPL-SCNC: 104 MMOL/L (ref 98–107)
CREAT SERPL-MCNC: 1.08 MG/DL (ref 0.51–0.95)
DEPRECATED HCO3 PLAS-SCNC: 27 MMOL/L (ref 22–29)
GFR SERPL CREATININE-BSD FRML MDRD: 52 ML/MIN/1.73M2
GLUCOSE SERPL-MCNC: 109 MG/DL (ref 70–99)
POTASSIUM SERPL-SCNC: 4 MMOL/L (ref 3.4–5.3)
SODIUM SERPL-SCNC: 143 MMOL/L (ref 136–145)

## 2022-10-28 PROCEDURE — 80048 BASIC METABOLIC PNL TOTAL CA: CPT | Mod: ORL | Performed by: PHYSICIAN ASSISTANT

## 2022-11-06 ENCOUNTER — LAB REQUISITION (OUTPATIENT)
Dept: LAB | Facility: CLINIC | Age: 82
End: 2022-11-06
Payer: MEDICARE

## 2022-11-06 DIAGNOSIS — Z01.812 ENCOUNTER FOR PREPROCEDURAL LABORATORY EXAMINATION: ICD-10-CM

## 2022-11-06 PROCEDURE — U0005 INFEC AGEN DETEC AMPLI PROBE: HCPCS | Mod: ORL | Performed by: PHYSICIAN ASSISTANT

## 2022-11-07 LAB — SARS-COV-2 RNA RESP QL NAA+PROBE: NEGATIVE

## 2022-11-08 ENCOUNTER — ANESTHESIA (OUTPATIENT)
Dept: SURGERY | Facility: CLINIC | Age: 82
End: 2022-11-08
Payer: MEDICARE

## 2022-11-08 ENCOUNTER — APPOINTMENT (OUTPATIENT)
Dept: RADIOLOGY | Facility: CLINIC | Age: 82
End: 2022-11-08
Attending: ORTHOPAEDIC SURGERY
Payer: MEDICARE

## 2022-11-08 ENCOUNTER — HOSPITAL ENCOUNTER (OUTPATIENT)
Facility: CLINIC | Age: 82
Discharge: HOME | End: 2022-11-10
Attending: ORTHOPAEDIC SURGERY | Admitting: ORTHOPAEDIC SURGERY
Payer: MEDICARE

## 2022-11-08 ENCOUNTER — ANESTHESIA EVENT (OUTPATIENT)
Dept: SURGERY | Facility: CLINIC | Age: 82
End: 2022-11-08
Payer: MEDICARE

## 2022-11-08 DIAGNOSIS — M48.062 SPINAL STENOSIS OF LUMBAR REGION WITH NEUROGENIC CLAUDICATION: Primary | ICD-10-CM

## 2022-11-08 LAB
GLUCOSE BLDC GLUCOMTR-MCNC: 120 MG/DL (ref 70–99)
GLUCOSE BLDC GLUCOMTR-MCNC: 160 MG/DL (ref 70–99)
GLUCOSE BLDC GLUCOMTR-MCNC: 335 MG/DL (ref 70–99)

## 2022-11-08 PROCEDURE — 250N000009 HC RX 250: Performed by: ANESTHESIOLOGY

## 2022-11-08 PROCEDURE — 258N000003 HC RX IP 258 OP 636: Performed by: ANESTHESIOLOGY

## 2022-11-08 PROCEDURE — 82962 GLUCOSE BLOOD TEST: CPT | Mod: 91

## 2022-11-08 PROCEDURE — 999N000141 HC STATISTIC PRE-PROCEDURE NURSING ASSESSMENT: Performed by: ORTHOPAEDIC SURGERY

## 2022-11-08 PROCEDURE — 710N000010 HC RECOVERY PHASE 1, LEVEL 2, PER MIN: Performed by: ORTHOPAEDIC SURGERY

## 2022-11-08 PROCEDURE — 250N000005 HC OR RX SURGIFLO HEMOSTATIC MATRIX 10ML 199102S OPNP: Performed by: ORTHOPAEDIC SURGERY

## 2022-11-08 PROCEDURE — 250N000013 HC RX MED GY IP 250 OP 250 PS 637: Performed by: HOSPITALIST

## 2022-11-08 PROCEDURE — 250N000011 HC RX IP 250 OP 636: Performed by: PHYSICIAN ASSISTANT

## 2022-11-08 PROCEDURE — 250N000009 HC RX 250: Performed by: PHYSICIAN ASSISTANT

## 2022-11-08 PROCEDURE — 250N000011 HC RX IP 250 OP 636: Performed by: NURSE ANESTHETIST, CERTIFIED REGISTERED

## 2022-11-08 PROCEDURE — 250N000009 HC RX 250: Performed by: NURSE ANESTHETIST, CERTIFIED REGISTERED

## 2022-11-08 PROCEDURE — 250N000013 HC RX MED GY IP 250 OP 250 PS 637: Performed by: PHYSICIAN ASSISTANT

## 2022-11-08 PROCEDURE — 258N000003 HC RX IP 258 OP 636: Performed by: NURSE ANESTHETIST, CERTIFIED REGISTERED

## 2022-11-08 PROCEDURE — 99202 OFFICE O/P NEW SF 15 MIN: CPT | Performed by: HOSPITALIST

## 2022-11-08 PROCEDURE — 999N000182 XR SURGERY CARM FLUORO GREATER THAN 5 MIN: Mod: TC

## 2022-11-08 PROCEDURE — 272N000001 HC OR GENERAL SUPPLY STERILE: Performed by: ORTHOPAEDIC SURGERY

## 2022-11-08 PROCEDURE — 370N000017 HC ANESTHESIA TECHNICAL FEE, PER MIN: Performed by: ORTHOPAEDIC SURGERY

## 2022-11-08 PROCEDURE — 360N000084 HC SURGERY LEVEL 4 W/ FLUORO, PER MIN: Performed by: ORTHOPAEDIC SURGERY

## 2022-11-08 PROCEDURE — 250N000009 HC RX 250: Performed by: ORTHOPAEDIC SURGERY

## 2022-11-08 PROCEDURE — 250N000025 HC SEVOFLURANE, PER MIN: Performed by: ORTHOPAEDIC SURGERY

## 2022-11-08 PROCEDURE — 250N000011 HC RX IP 250 OP 636: Performed by: ANESTHESIOLOGY

## 2022-11-08 RX ORDER — ZOLPIDEM TARTRATE 5 MG/1
10 TABLET ORAL
Status: DISCONTINUED | OUTPATIENT
Start: 2022-11-08 | End: 2022-11-10 | Stop reason: HOSPADM

## 2022-11-08 RX ORDER — ACETAMINOPHEN 325 MG/1
325-650 TABLET ORAL EVERY 4 HOURS PRN
Qty: 50 TABLET | Refills: 0 | Status: SHIPPED | OUTPATIENT
Start: 2022-11-08 | End: 2024-09-24

## 2022-11-08 RX ORDER — SODIUM CHLORIDE, SODIUM LACTATE, POTASSIUM CHLORIDE, CALCIUM CHLORIDE 600; 310; 30; 20 MG/100ML; MG/100ML; MG/100ML; MG/100ML
INJECTION, SOLUTION INTRAVENOUS CONTINUOUS
Status: DISCONTINUED | OUTPATIENT
Start: 2022-11-08 | End: 2022-11-08 | Stop reason: HOSPADM

## 2022-11-08 RX ORDER — FLUTICASONE PROPIONATE 50 MCG
2 SPRAY, SUSPENSION (ML) NASAL 2 TIMES DAILY
Status: DISCONTINUED | OUTPATIENT
Start: 2022-11-08 | End: 2022-11-10 | Stop reason: HOSPADM

## 2022-11-08 RX ORDER — PROPOFOL 10 MG/ML
INJECTION, EMULSION INTRAVENOUS PRN
Status: DISCONTINUED | OUTPATIENT
Start: 2022-11-08 | End: 2022-11-08

## 2022-11-08 RX ORDER — ATORVASTATIN CALCIUM 10 MG/1
20 TABLET, FILM COATED ORAL AT BEDTIME
Status: DISCONTINUED | OUTPATIENT
Start: 2022-11-08 | End: 2022-11-10 | Stop reason: HOSPADM

## 2022-11-08 RX ORDER — NALOXONE HYDROCHLORIDE 0.4 MG/ML
0.2 INJECTION, SOLUTION INTRAMUSCULAR; INTRAVENOUS; SUBCUTANEOUS
Status: DISCONTINUED | OUTPATIENT
Start: 2022-11-08 | End: 2022-11-10 | Stop reason: HOSPADM

## 2022-11-08 RX ORDER — LIDOCAINE 40 MG/G
CREAM TOPICAL
Status: DISCONTINUED | OUTPATIENT
Start: 2022-11-08 | End: 2022-11-08 | Stop reason: HOSPADM

## 2022-11-08 RX ORDER — ONDANSETRON 2 MG/ML
4 INJECTION INTRAMUSCULAR; INTRAVENOUS EVERY 6 HOURS PRN
Status: DISCONTINUED | OUTPATIENT
Start: 2022-11-08 | End: 2022-11-10 | Stop reason: HOSPADM

## 2022-11-08 RX ORDER — DEXTROSE MONOHYDRATE 25 G/50ML
25-50 INJECTION, SOLUTION INTRAVENOUS
Status: DISCONTINUED | OUTPATIENT
Start: 2022-11-08 | End: 2022-11-10 | Stop reason: HOSPADM

## 2022-11-08 RX ORDER — FENTANYL CITRATE 50 UG/ML
50 INJECTION, SOLUTION INTRAMUSCULAR; INTRAVENOUS ONCE
Status: COMPLETED | OUTPATIENT
Start: 2022-11-08 | End: 2022-11-08

## 2022-11-08 RX ORDER — AMOXICILLIN 250 MG
1 CAPSULE ORAL DAILY PRN
Qty: 30 TABLET | Refills: 0 | Status: SHIPPED | OUTPATIENT
Start: 2022-11-08 | End: 2023-09-11

## 2022-11-08 RX ORDER — OXYCODONE HYDROCHLORIDE 5 MG/1
5-10 TABLET ORAL EVERY 4 HOURS PRN
Qty: 20 TABLET | Refills: 0 | Status: SHIPPED | OUTPATIENT
Start: 2022-11-08 | End: 2023-05-16

## 2022-11-08 RX ORDER — AMOXICILLIN 250 MG
1 CAPSULE ORAL 2 TIMES DAILY PRN
Status: DISCONTINUED | OUTPATIENT
Start: 2022-11-08 | End: 2022-11-08

## 2022-11-08 RX ORDER — ACETAMINOPHEN 325 MG/1
975 TABLET ORAL EVERY 8 HOURS
Status: DISCONTINUED | OUTPATIENT
Start: 2022-11-08 | End: 2022-11-10 | Stop reason: HOSPADM

## 2022-11-08 RX ORDER — TRAMADOL HYDROCHLORIDE 50 MG/1
50 TABLET ORAL DAILY PRN
Status: ON HOLD | COMMUNITY
End: 2022-11-10

## 2022-11-08 RX ORDER — SCOLOPAMINE TRANSDERMAL SYSTEM 1 MG/1
1 PATCH, EXTENDED RELEASE TRANSDERMAL ONCE
Status: DISCONTINUED | OUTPATIENT
Start: 2022-11-08 | End: 2022-11-08 | Stop reason: HOSPADM

## 2022-11-08 RX ORDER — OXYCODONE HYDROCHLORIDE 5 MG/1
5 TABLET ORAL EVERY 4 HOURS PRN
Status: DISCONTINUED | OUTPATIENT
Start: 2022-11-08 | End: 2022-11-08 | Stop reason: HOSPADM

## 2022-11-08 RX ORDER — CEFAZOLIN SODIUM/WATER 2 G/20 ML
2 SYRINGE (ML) INTRAVENOUS SEE ADMIN INSTRUCTIONS
Status: DISCONTINUED | OUTPATIENT
Start: 2022-11-08 | End: 2022-11-08 | Stop reason: HOSPADM

## 2022-11-08 RX ORDER — BUPIVACAINE HYDROCHLORIDE AND EPINEPHRINE 2.5; 5 MG/ML; UG/ML
2 INJECTION, SOLUTION EPIDURAL; INFILTRATION; INTRACAUDAL; PERINEURAL ONCE
Status: COMPLETED | OUTPATIENT
Start: 2022-11-08 | End: 2022-11-08

## 2022-11-08 RX ORDER — PROCHLORPERAZINE MALEATE 5 MG
5 TABLET ORAL EVERY 6 HOURS PRN
Status: DISCONTINUED | OUTPATIENT
Start: 2022-11-08 | End: 2022-11-10 | Stop reason: HOSPADM

## 2022-11-08 RX ORDER — LOSARTAN POTASSIUM 50 MG/1
100 TABLET ORAL DAILY
Status: DISCONTINUED | OUTPATIENT
Start: 2022-11-09 | End: 2022-11-10 | Stop reason: HOSPADM

## 2022-11-08 RX ORDER — ONDANSETRON 2 MG/ML
INJECTION INTRAMUSCULAR; INTRAVENOUS PRN
Status: DISCONTINUED | OUTPATIENT
Start: 2022-11-08 | End: 2022-11-08

## 2022-11-08 RX ORDER — METHOCARBAMOL 500 MG/1
500 TABLET, FILM COATED ORAL EVERY 6 HOURS PRN
Qty: 40 TABLET | Refills: 0 | Status: SHIPPED | OUTPATIENT
Start: 2022-11-08 | End: 2024-09-19

## 2022-11-08 RX ORDER — NALOXONE HYDROCHLORIDE 0.4 MG/ML
0.4 INJECTION, SOLUTION INTRAMUSCULAR; INTRAVENOUS; SUBCUTANEOUS
Status: DISCONTINUED | OUTPATIENT
Start: 2022-11-08 | End: 2022-11-10 | Stop reason: HOSPADM

## 2022-11-08 RX ORDER — FENTANYL CITRATE 50 UG/ML
INJECTION, SOLUTION INTRAMUSCULAR; INTRAVENOUS PRN
Status: DISCONTINUED | OUTPATIENT
Start: 2022-11-08 | End: 2022-11-08

## 2022-11-08 RX ORDER — ONDANSETRON 2 MG/ML
4 INJECTION INTRAMUSCULAR; INTRAVENOUS EVERY 30 MIN PRN
Status: DISCONTINUED | OUTPATIENT
Start: 2022-11-08 | End: 2022-11-08 | Stop reason: HOSPADM

## 2022-11-08 RX ORDER — DEXAMETHASONE SODIUM PHOSPHATE 4 MG/ML
INJECTION, SOLUTION INTRA-ARTICULAR; INTRALESIONAL; INTRAMUSCULAR; INTRAVENOUS; SOFT TISSUE PRN
Status: DISCONTINUED | OUTPATIENT
Start: 2022-11-08 | End: 2022-11-08

## 2022-11-08 RX ORDER — FENTANYL CITRATE 50 UG/ML
25 INJECTION, SOLUTION INTRAMUSCULAR; INTRAVENOUS EVERY 5 MIN PRN
Status: DISCONTINUED | OUTPATIENT
Start: 2022-11-08 | End: 2022-11-08 | Stop reason: HOSPADM

## 2022-11-08 RX ORDER — CLINDAMYCIN PHOSPHATE 900 MG/50ML
INJECTION, SOLUTION INTRAVENOUS
Status: DISCONTINUED
Start: 2022-11-08 | End: 2022-11-08 | Stop reason: HOSPADM

## 2022-11-08 RX ORDER — HYDROMORPHONE HCL IN WATER/PF 6 MG/30 ML
0.2 PATIENT CONTROLLED ANALGESIA SYRINGE INTRAVENOUS EVERY 5 MIN PRN
Status: DISCONTINUED | OUTPATIENT
Start: 2022-11-08 | End: 2022-11-08 | Stop reason: HOSPADM

## 2022-11-08 RX ORDER — AMLODIPINE BESYLATE 10 MG/1
10 TABLET ORAL DAILY
Status: DISCONTINUED | OUTPATIENT
Start: 2022-11-08 | End: 2022-11-10 | Stop reason: HOSPADM

## 2022-11-08 RX ORDER — METHOCARBAMOL 500 MG/1
500 TABLET, FILM COATED ORAL EVERY 6 HOURS PRN
Status: DISCONTINUED | OUTPATIENT
Start: 2022-11-08 | End: 2022-11-10 | Stop reason: HOSPADM

## 2022-11-08 RX ORDER — FAMOTIDINE 20 MG/1
20 TABLET, FILM COATED ORAL DAILY PRN
Status: DISCONTINUED | OUTPATIENT
Start: 2022-11-08 | End: 2022-11-10 | Stop reason: HOSPADM

## 2022-11-08 RX ORDER — HYDROCHLOROTHIAZIDE 25 MG/1
50 TABLET ORAL DAILY
Status: DISCONTINUED | OUTPATIENT
Start: 2022-11-08 | End: 2022-11-10 | Stop reason: HOSPADM

## 2022-11-08 RX ORDER — NICOTINE POLACRILEX 4 MG
15-30 LOZENGE BUCCAL
Status: DISCONTINUED | OUTPATIENT
Start: 2022-11-08 | End: 2022-11-10 | Stop reason: HOSPADM

## 2022-11-08 RX ORDER — CLINDAMYCIN PHOSPHATE 900 MG/50ML
900 INJECTION, SOLUTION INTRAVENOUS EVERY 8 HOURS
Status: DISPENSED | OUTPATIENT
Start: 2022-11-09 | End: 2022-11-09

## 2022-11-08 RX ORDER — AMOXICILLIN 250 MG
1 CAPSULE ORAL 2 TIMES DAILY
Status: DISCONTINUED | OUTPATIENT
Start: 2022-11-08 | End: 2022-11-10 | Stop reason: HOSPADM

## 2022-11-08 RX ORDER — METFORMIN HCL 500 MG
1000 TABLET, EXTENDED RELEASE 24 HR ORAL
COMMUNITY
End: 2023-05-16

## 2022-11-08 RX ORDER — ONDANSETRON 4 MG/1
4 TABLET, ORALLY DISINTEGRATING ORAL EVERY 30 MIN PRN
Status: DISCONTINUED | OUTPATIENT
Start: 2022-11-08 | End: 2022-11-08 | Stop reason: HOSPADM

## 2022-11-08 RX ORDER — METHOCARBAMOL 500 MG/1
500 TABLET, FILM COATED ORAL 4 TIMES DAILY PRN
Status: DISCONTINUED | OUTPATIENT
Start: 2022-11-08 | End: 2022-11-09

## 2022-11-08 RX ORDER — CEFAZOLIN SODIUM/WATER 2 G/20 ML
2 SYRINGE (ML) INTRAVENOUS
Status: DISCONTINUED | OUTPATIENT
Start: 2022-11-08 | End: 2022-11-08 | Stop reason: HOSPADM

## 2022-11-08 RX ORDER — LIDOCAINE 40 MG/G
CREAM TOPICAL
Status: DISCONTINUED | OUTPATIENT
Start: 2022-11-08 | End: 2022-11-10 | Stop reason: HOSPADM

## 2022-11-08 RX ORDER — ALBUTEROL SULFATE 90 UG/1
2 AEROSOL, METERED RESPIRATORY (INHALATION) EVERY 4 HOURS PRN
Status: DISCONTINUED | OUTPATIENT
Start: 2022-11-08 | End: 2022-11-10 | Stop reason: HOSPADM

## 2022-11-08 RX ORDER — BISACODYL 10 MG
10 SUPPOSITORY, RECTAL RECTAL DAILY PRN
Status: DISCONTINUED | OUTPATIENT
Start: 2022-11-08 | End: 2022-11-10 | Stop reason: HOSPADM

## 2022-11-08 RX ORDER — OXYCODONE HYDROCHLORIDE 5 MG/1
5 TABLET ORAL EVERY 4 HOURS PRN
Status: DISCONTINUED | OUTPATIENT
Start: 2022-11-08 | End: 2022-11-10

## 2022-11-08 RX ORDER — METFORMIN HCL 500 MG
1000 TABLET, EXTENDED RELEASE 24 HR ORAL 2 TIMES DAILY WITH MEALS
COMMUNITY

## 2022-11-08 RX ORDER — ACETAMINOPHEN 325 MG/1
650 TABLET ORAL EVERY 4 HOURS PRN
Status: DISCONTINUED | OUTPATIENT
Start: 2022-11-11 | End: 2022-11-10 | Stop reason: HOSPADM

## 2022-11-08 RX ORDER — GABAPENTIN 300 MG/1
600 CAPSULE ORAL AT BEDTIME
Status: DISCONTINUED | OUTPATIENT
Start: 2022-11-08 | End: 2022-11-10 | Stop reason: HOSPADM

## 2022-11-08 RX ORDER — MAGNESIUM SULFATE 4 G/50ML
4 INJECTION INTRAVENOUS ONCE
Status: DISCONTINUED | OUTPATIENT
Start: 2022-11-08 | End: 2022-11-08 | Stop reason: HOSPADM

## 2022-11-08 RX ORDER — KETAMINE HYDROCHLORIDE 10 MG/ML
INJECTION INTRAMUSCULAR; INTRAVENOUS PRN
Status: DISCONTINUED | OUTPATIENT
Start: 2022-11-08 | End: 2022-11-08

## 2022-11-08 RX ORDER — DIPHENHYDRAMINE HCL 12.5 MG/5ML
12.5 SOLUTION ORAL EVERY 6 HOURS PRN
Status: DISCONTINUED | OUTPATIENT
Start: 2022-11-08 | End: 2022-11-10 | Stop reason: HOSPADM

## 2022-11-08 RX ORDER — ALLOPURINOL 100 MG/1
100 TABLET ORAL DAILY
Status: DISCONTINUED | OUTPATIENT
Start: 2022-11-08 | End: 2022-11-10 | Stop reason: HOSPADM

## 2022-11-08 RX ORDER — MAGNESIUM HYDROXIDE 1200 MG/15ML
LIQUID ORAL PRN
Status: DISCONTINUED | OUTPATIENT
Start: 2022-11-08 | End: 2022-11-08 | Stop reason: HOSPADM

## 2022-11-08 RX ORDER — CLINDAMYCIN PHOSPHATE 900 MG/50ML
INJECTION, SOLUTION INTRAVENOUS PRN
Status: DISCONTINUED | OUTPATIENT
Start: 2022-11-08 | End: 2022-11-08

## 2022-11-08 RX ORDER — POLYETHYLENE GLYCOL 3350 17 G/17G
17 POWDER, FOR SOLUTION ORAL DAILY
Status: DISCONTINUED | OUTPATIENT
Start: 2022-11-09 | End: 2022-11-10 | Stop reason: HOSPADM

## 2022-11-08 RX ORDER — ONDANSETRON 4 MG/1
4 TABLET, ORALLY DISINTEGRATING ORAL EVERY 6 HOURS PRN
Status: DISCONTINUED | OUTPATIENT
Start: 2022-11-08 | End: 2022-11-10 | Stop reason: HOSPADM

## 2022-11-08 RX ORDER — HYDROMORPHONE HCL IN WATER/PF 6 MG/30 ML
0.2 PATIENT CONTROLLED ANALGESIA SYRINGE INTRAVENOUS
Status: DISCONTINUED | OUTPATIENT
Start: 2022-11-08 | End: 2022-11-10 | Stop reason: HOSPADM

## 2022-11-08 RX ADMIN — FENTANYL CITRATE 50 MCG: 50 INJECTION, SOLUTION INTRAMUSCULAR; INTRAVENOUS at 16:30

## 2022-11-08 RX ADMIN — HYDROMORPHONE HYDROCHLORIDE 0.2 MG: 0.2 INJECTION, SOLUTION INTRAMUSCULAR; INTRAVENOUS; SUBCUTANEOUS at 18:55

## 2022-11-08 RX ADMIN — FENTANYL CITRATE 50 MCG: 50 INJECTION, SOLUTION INTRAMUSCULAR; INTRAVENOUS at 16:48

## 2022-11-08 RX ADMIN — ZOLPIDEM TARTRATE 10 MG: 5 TABLET ORAL at 23:46

## 2022-11-08 RX ADMIN — ATORVASTATIN CALCIUM 20 MG: 10 TABLET, FILM COATED ORAL at 20:53

## 2022-11-08 RX ADMIN — FENTANYL CITRATE 25 MCG: 50 INJECTION INTRAMUSCULAR; INTRAVENOUS at 18:36

## 2022-11-08 RX ADMIN — HYDROMORPHONE HYDROCHLORIDE 0.2 MG: 0.2 INJECTION, SOLUTION INTRAMUSCULAR; INTRAVENOUS; SUBCUTANEOUS at 19:00

## 2022-11-08 RX ADMIN — FENTANYL CITRATE 25 MCG: 50 INJECTION INTRAMUSCULAR; INTRAVENOUS at 18:46

## 2022-11-08 RX ADMIN — FENTANYL CITRATE 25 MCG: 50 INJECTION INTRAMUSCULAR; INTRAVENOUS at 18:41

## 2022-11-08 RX ADMIN — DEXAMETHASONE SODIUM PHOSPHATE 4 MG: 4 INJECTION, SOLUTION INTRA-ARTICULAR; INTRALESIONAL; INTRAMUSCULAR; INTRAVENOUS; SOFT TISSUE at 16:30

## 2022-11-08 RX ADMIN — SODIUM CHLORIDE, POTASSIUM CHLORIDE, SODIUM LACTATE AND CALCIUM CHLORIDE: 600; 310; 30; 20 INJECTION, SOLUTION INTRAVENOUS at 16:54

## 2022-11-08 RX ADMIN — SENNOSIDES AND DOCUSATE SODIUM 1 TABLET: 50; 8.6 TABLET ORAL at 20:51

## 2022-11-08 RX ADMIN — KETAMINE HYDROCHLORIDE 50 MG: 10 INJECTION, SOLUTION INTRAMUSCULAR; INTRAVENOUS at 16:29

## 2022-11-08 RX ADMIN — AMLODIPINE BESYLATE 10 MG: 10 TABLET ORAL at 20:51

## 2022-11-08 RX ADMIN — ONDANSETRON 4 MG: 2 INJECTION INTRAMUSCULAR; INTRAVENOUS at 16:51

## 2022-11-08 RX ADMIN — FENTANYL CITRATE 50 MCG: 50 INJECTION, SOLUTION INTRAMUSCULAR; INTRAVENOUS at 14:49

## 2022-11-08 RX ADMIN — LIDOCAINE HYDROCHLORIDE 40 MG: 10 INJECTION, SOLUTION INFILTRATION; PERINEURAL at 16:28

## 2022-11-08 RX ADMIN — GABAPENTIN 600 MG: 300 CAPSULE ORAL at 20:53

## 2022-11-08 RX ADMIN — FENTANYL CITRATE 25 MCG: 50 INJECTION INTRAMUSCULAR; INTRAVENOUS at 18:31

## 2022-11-08 RX ADMIN — ONDANSETRON 4 MG: 2 INJECTION INTRAMUSCULAR; INTRAVENOUS at 18:17

## 2022-11-08 RX ADMIN — ROCURONIUM BROMIDE 15 MG: 10 INJECTION, SOLUTION INTRAVENOUS at 17:17

## 2022-11-08 RX ADMIN — OXYCODONE HYDROCHLORIDE 5 MG: 5 TABLET ORAL at 22:39

## 2022-11-08 RX ADMIN — ALLOPURINOL 100 MG: 100 TABLET ORAL at 20:51

## 2022-11-08 RX ADMIN — SUGAMMADEX 200 MG: 100 INJECTION, SOLUTION INTRAVENOUS at 17:55

## 2022-11-08 RX ADMIN — METHOCARBAMOL 500 MG: 500 TABLET, FILM COATED ORAL at 18:35

## 2022-11-08 RX ADMIN — PROPOFOL 120 MG: 10 INJECTION, EMULSION INTRAVENOUS at 16:29

## 2022-11-08 RX ADMIN — FENTANYL CITRATE 50 MCG: 50 INJECTION, SOLUTION INTRAMUSCULAR; INTRAVENOUS at 16:51

## 2022-11-08 RX ADMIN — SODIUM CHLORIDE, POTASSIUM CHLORIDE, SODIUM LACTATE AND CALCIUM CHLORIDE: 600; 310; 30; 20 INJECTION, SOLUTION INTRAVENOUS at 13:41

## 2022-11-08 RX ADMIN — CLINDAMYCIN PHOSPHATE 900 MG: 900 INJECTION, SOLUTION INTRAVENOUS at 23:47

## 2022-11-08 RX ADMIN — CLINDAMYCIN PHOSPHATE 900 MG: 900 INJECTION, SOLUTION INTRAVENOUS at 16:20

## 2022-11-08 RX ADMIN — MIDAZOLAM 1 MG: 1 INJECTION INTRAMUSCULAR; INTRAVENOUS at 16:23

## 2022-11-08 RX ADMIN — ROCURONIUM BROMIDE 50 MG: 10 INJECTION, SOLUTION INTRAVENOUS at 16:29

## 2022-11-08 RX ADMIN — ACETAMINOPHEN 975 MG: 325 TABLET, FILM COATED ORAL at 20:51

## 2022-11-08 RX ADMIN — HYDROCHLOROTHIAZIDE 50 MG: 25 TABLET ORAL at 20:51

## 2022-11-08 RX ADMIN — HYDROMORPHONE HYDROCHLORIDE 0.2 MG: 0.2 INJECTION, SOLUTION INTRAMUSCULAR; INTRAVENOUS; SUBCUTANEOUS at 18:50

## 2022-11-08 RX ADMIN — MIDAZOLAM 1 MG: 1 INJECTION INTRAMUSCULAR; INTRAVENOUS at 14:48

## 2022-11-08 RX ADMIN — DEXMEDETOMIDINE HYDROCHLORIDE 0.3 MCG/KG/HR: 100 INJECTION, SOLUTION INTRAVENOUS at 16:40

## 2022-11-08 RX ADMIN — FENTANYL CITRATE 50 MCG: 50 INJECTION, SOLUTION INTRAMUSCULAR; INTRAVENOUS at 16:46

## 2022-11-08 ASSESSMENT — ACTIVITIES OF DAILY LIVING (ADL)
ADLS_ACUITY_SCORE: 24
ADLS_ACUITY_SCORE: 35
ADLS_ACUITY_SCORE: 24
ADLS_ACUITY_SCORE: 23

## 2022-11-08 ASSESSMENT — COPD QUESTIONNAIRES: COPD: 1

## 2022-11-08 NOTE — PHARMACY-ADMISSION MEDICATION HISTORY
Pharmacy Note - Admission Medication History    Pertinent Provider Information:   ______________________________________________________________________    Prior To Admission (PTA) med list completed and updated in EMR.       PTA Med List   Medication Sig Last Dose     albuterol (PROAIR HFA/PROVENTIL HFA/VENTOLIN HFA) 108 (90 Base) MCG/ACT inhaler Inhale 2 puffs into the lungs every 4 hours as needed for shortness of breath / dyspnea or wheezing prn at has with     allopurinoL (ZYLOPRIM) 100 MG tablet [ALLOPURINOL (ZYLOPRIM) 100 MG TABLET] Take 100 mg by mouth daily. 11/7/2022     amLODIPine (NORVASC) 10 MG tablet Take 10 mg by mouth daily 11/7/2022     atorvastatin (LIPITOR) 20 MG tablet [ATORVASTATIN (LIPITOR) 20 MG TABLET] Take 20 mg by mouth at bedtime. 11/7/2022     famotidine (PEPCID) 20 MG tablet Take 20 mg by mouth daily as needed More than a month     fluticasone (FLONASE) 50 MCG/ACT nasal spray SPRAY 2 SPRAYS IN NOSTRIL 2 TIMES DAILY 11/7/2022 at has with     gabapentin (NEURONTIN) 300 MG capsule Take 600 mg by mouth At Bedtime  11/7/2022     hydrochlorothiazide (HYDRODIURIL) 25 MG tablet Take 50 mg by mouth daily 11/7/2022     losartan (COZAAR) 100 MG tablet Take 100 mg by mouth daily 11/8/2022     metFORMIN (GLUCOPHAGE XR) 500 MG 24 hr tablet Take 500 mg by mouth daily (with breakfast) 11/7/2022     metFORMIN (GLUCOPHAGE XR) 500 MG 24 hr tablet Take 1,000 mg by mouth daily (with dinner) 11/7/2022     senna-docusate (SENOKOT-S/PERICOLACE) 8.6-50 MG tablet Take 1 tablet by mouth 2 times daily as needed Past Week     traMADol (ULTRAM) 50 MG tablet Take 50 mg by mouth daily as needed for severe pain Past Week     TRELEGY ELLIPTA 100-62.5-25 MCG/INH oral inhaler Inhale 1 puff into the lungs daily 11/8/2022 at has with     zolpidem (AMBIEN) 10 MG tablet Take 1 tablet (10 mg) by mouth nightly as needed for sleep prn       Information source(s): Patient and CareEverywhere/SureScripts    Method of interview  communication: in-person    Patient was asked about OTC/herbal products specifically.  PTA med list reflects this.    Based on the pharmacist's assessment, the PTA med list information appears reliable    Allergies were reviewed, assessed, and updated with the patient.      Medications available for use during hospital stay: Inhalers & Nasal Spray.      Thank you for the opportunity to participate in the care of this patient.      Anibal Sherwood [facility]     11/8/2022     1:35 PM

## 2022-11-08 NOTE — ANESTHESIA PREPROCEDURE EVALUATION
Anesthesia Pre-Procedure Evaluation    Patient: Katt Dsouza   MRN: [id number] : 1940        Procedure : Procedure(s):  LUMBAR 3 - LUMBAR 4 BILATERAL MEDIAL FACETECTOMY, DECOMPRESSION PARTIAL DISCECTOMY          Past Medical History:   Diagnosis Date     Diabetes (H)      Hypertension      Uncomplicated asthma       Past Surgical History:   Procedure Laterality Date     BACK SURGERY        Allergies   Allergen Reactions     Amoxicillin      Other reaction(s): hives     Animal Dander Unknown     Penicillin G      Penicillin V      Other reaction(s): hives     Penicillins Hives     Sulfa Drugs Hives      Social History     Tobacco Use     Smoking status: Former     Types: Cigarettes     Quit date: 1995     Years since quittin.8     Smokeless tobacco: Never   Substance Use Topics     Alcohol use: Yes     Comment: rare      Wt Readings from Last 1 Encounters:   22 91.2 kg (201 lb)        Anesthesia Evaluation            ROS/MED HX  ENT/Pulmonary:     (+) asthma COPD,     Neurologic:     (+) delerium,     Cardiovascular:     (+) hypertension-----CHF     METS/Exercise Tolerance: 4 - Raking leaves, gardening    Hematologic:       Musculoskeletal:       GI/Hepatic:     (+) GERD,     Renal/Genitourinary:     (+) renal disease,     Endo:     (+) type II DM, Obesity,     Psychiatric/Substance Use:       Infectious Disease:       Malignancy:       Other:            Physical Exam    Airway  airway exam normal      Mallampati: II   TM distance: < 3 FB   Neck ROM: limited   Mouth opening: > 3 cm    Respiratory Devices and Support         Dental  no notable dental history         Cardiovascular          Rhythm and rate: regular and normal   (+) murmur (holosystolic, sound is c/w aortic stenosis)       Pulmonary   pulmonary exam normal        breath sounds clear to auscultation           OUTSIDE LABS:  CBC:   Lab Results   Component Value Date    WBC 9.7 2022    WBC 9.9 2022    HGB 13.7 2022     HGB 12.1 01/28/2022    HCT 42.0 05/19/2022    HCT 36.4 01/28/2022     05/19/2022     01/28/2022     BMP:   Lab Results   Component Value Date     10/28/2022     05/19/2022    POTASSIUM 4.0 10/28/2022    POTASSIUM 3.4 (L) 05/19/2022    CHLORIDE 104 10/28/2022    CHLORIDE 104 05/19/2022    CO2 27 10/28/2022    CO2 30 05/19/2022    BUN 25.7 (H) 10/28/2022    BUN 21 05/19/2022    CR 1.08 (H) 10/28/2022    CR 1.12 (H) 05/19/2022     (H) 11/08/2022     (H) 10/28/2022     COAGS:   Lab Results   Component Value Date    PTT 30 01/28/2022    INR 1.01 01/28/2022     POC: No results found for: BGM, HCG, HCGS  HEPATIC:   Lab Results   Component Value Date    ALBUMIN 4.1 05/19/2022    PROTTOTAL 7.3 05/19/2022    ALT 28 05/19/2022    AST 25 05/19/2022    ALKPHOS 98 05/19/2022    BILITOTAL 0.3 05/19/2022     OTHER:   Lab Results   Component Value Date    A1C 6.4 (H) 08/28/2013    MARIO 9.7 10/28/2022    MAG 2.1 04/26/2021    TSH 2.55 05/19/2022    CRP 0.5 02/23/2022    SED 37 (H) 02/23/2022       Anesthesia Plan    ASA Status:  3      Anesthesia Type: General.     - Airway: ETT              Consents    Anesthesia Plan(s) and associated risks, benefits, and realistic alternatives discussed. Questions answered and patient/representative(s) expressed understanding.     - Discussed: Risks, Benefits and Alternatives for the PROCEDURE were discussed     - Discussed with:  Patient      - Extended Intubation/Ventilatory Support Discussed: No.      - Patient is DNR/DNI Status: No    Use of blood products discussed: No .     Postoperative Care    Pain management: Multi-modal analgesia.   PONV prophylaxis: Ondansetron (or other 5HT-3), Dexamethasone or Solumedrol     Comments:                Patt Huang MD

## 2022-11-08 NOTE — INTERVAL H&P NOTE
"I have reviewed the surgical (or preoperative) H&P that is linked to this encounter, and examined the patient. There are no significant changes    Clinical Conditions Present on Arrival:  Clinically Significant Risk Factors Present on Admission                    # Obesity: Estimated body mass index is 34.5 kg/m  as calculated from the following:    Height as of this encounter: 1.626 m (5' 4\").    Weight as of this encounter: 91.2 kg (201 lb).       "

## 2022-11-08 NOTE — ANESTHESIA PROCEDURE NOTES
Airway       Patient location during procedure: OR       Procedure Start/Stop Times: 11/8/2022 4:31 PM  Staff -        CRNA: Guera Mann APRN CRNA       Performed By: CRNA  Consent for Airway        Urgency: elective  Indications and Patient Condition       Indications for airway management: brent-procedural       Induction type:intravenous       Mask difficulty assessment: 1 - vent by mask    Final Airway Details       Final airway type: endotracheal airway       Successful airway: ETT - single  Endotracheal Airway Details        ETT size (mm): 7.0       Cuffed: yes       Successful intubation technique: direct laryngoscopy       DL Blade Type: Cisneros 2       Grade View of Cords: 1       Position: Right       Measured from: lips       Secured at (cm): 23       Bite block used: Soft    Post intubation assessment        Placement verified by: capnometry, equal breath sounds and chest rise        Number of attempts at approach: 1       Number of other approaches attempted: 0       Secured with: silk tape       Ease of procedure: easy       Dentition: Unchanged       Dental guard used and removed. Dental Guard Type: Proguard Red.    Medication(s) Administered   Medication Administration Time: 11/8/2022 4:31 PM

## 2022-11-08 NOTE — PROVIDER NOTIFICATION
Patient c/o anxiety, requesting meds. Lavender essential oil given, encouraged deep breathing. Dr. Huang notified of anxiety, will be in to assess patient soon.

## 2022-11-09 ENCOUNTER — APPOINTMENT (OUTPATIENT)
Dept: ULTRASOUND IMAGING | Facility: CLINIC | Age: 82
End: 2022-11-09
Payer: MEDICARE

## 2022-11-09 ENCOUNTER — APPOINTMENT (OUTPATIENT)
Dept: OCCUPATIONAL THERAPY | Facility: CLINIC | Age: 82
End: 2022-11-09
Attending: ORTHOPAEDIC SURGERY
Payer: MEDICARE

## 2022-11-09 ENCOUNTER — APPOINTMENT (OUTPATIENT)
Dept: PHYSICAL THERAPY | Facility: CLINIC | Age: 82
End: 2022-11-09
Attending: PHYSICIAN ASSISTANT
Payer: MEDICARE

## 2022-11-09 LAB
ALBUMIN UR-MCNC: NEGATIVE MG/DL
ANION GAP SERPL CALCULATED.3IONS-SCNC: 7 MMOL/L (ref 5–18)
APPEARANCE UR: CLEAR
BILIRUB UR QL STRIP: NEGATIVE
BUN SERPL-MCNC: 22 MG/DL (ref 8–28)
CALCIUM SERPL-MCNC: 8.7 MG/DL (ref 8.5–10.5)
CHLORIDE BLD-SCNC: 102 MMOL/L (ref 98–107)
CO2 SERPL-SCNC: 28 MMOL/L (ref 22–31)
COLOR UR AUTO: ABNORMAL
CREAT SERPL-MCNC: 1.38 MG/DL (ref 0.6–1.1)
CYSTATIN C (ROCHE): 1.5 MG/L (ref 0.6–1)
ERYTHROCYTE [DISTWIDTH] IN BLOOD BY AUTOMATED COUNT: 13.2 % (ref 10–15)
GFR SERPL CREATININE-BSD FRML MDRD: 39 ML/MIN/1.73M2
GFR SERPL CREATININE-BSD FRML MDRD: 39 ML/MIN/1.73M2
GLUCOSE BLD-MCNC: 233 MG/DL (ref 70–125)
GLUCOSE BLDC GLUCOMTR-MCNC: 147 MG/DL (ref 70–99)
GLUCOSE BLDC GLUCOMTR-MCNC: 204 MG/DL (ref 70–99)
GLUCOSE BLDC GLUCOMTR-MCNC: 209 MG/DL (ref 70–99)
GLUCOSE BLDC GLUCOMTR-MCNC: 241 MG/DL (ref 70–99)
GLUCOSE UR STRIP-MCNC: NEGATIVE MG/DL
HCT VFR BLD AUTO: 37.9 % (ref 35–47)
HGB BLD-MCNC: 12.1 G/DL (ref 11.7–15.7)
HGB UR QL STRIP: NEGATIVE
KETONES UR STRIP-MCNC: NEGATIVE MG/DL
LEUKOCYTE ESTERASE UR QL STRIP: ABNORMAL
MAGNESIUM SERPL-MCNC: 2 MG/DL (ref 1.8–2.6)
MCH RBC QN AUTO: 30.3 PG (ref 26.5–33)
MCHC RBC AUTO-ENTMCNC: 31.9 G/DL (ref 31.5–36.5)
MCV RBC AUTO: 95 FL (ref 78–100)
NITRATE UR QL: NEGATIVE
PH UR STRIP: 5 [PH] (ref 5–7)
PLATELET # BLD AUTO: 281 10E3/UL (ref 150–450)
POTASSIUM BLD-SCNC: 4.1 MMOL/L (ref 3.5–5)
RBC # BLD AUTO: 3.99 10E6/UL (ref 3.8–5.2)
SODIUM SERPL-SCNC: 137 MMOL/L (ref 136–145)
SP GR UR STRIP: 1.01 (ref 1–1.03)
UROBILINOGEN UR STRIP-MCNC: <2 MG/DL
WBC # BLD AUTO: 14.6 10E3/UL (ref 4–11)

## 2022-11-09 PROCEDURE — 250N000011 HC RX IP 250 OP 636: Performed by: PHYSICIAN ASSISTANT

## 2022-11-09 PROCEDURE — 80048 BASIC METABOLIC PNL TOTAL CA: CPT | Performed by: HOSPITALIST

## 2022-11-09 PROCEDURE — 250N000013 HC RX MED GY IP 250 OP 250 PS 637: Performed by: PHYSICIAN ASSISTANT

## 2022-11-09 PROCEDURE — 82962 GLUCOSE BLOOD TEST: CPT | Mod: 91

## 2022-11-09 PROCEDURE — 99204 OFFICE O/P NEW MOD 45 MIN: CPT

## 2022-11-09 PROCEDURE — 250N000013 HC RX MED GY IP 250 OP 250 PS 637: Performed by: HOSPITALIST

## 2022-11-09 PROCEDURE — 36415 COLL VENOUS BLD VENIPUNCTURE: CPT | Performed by: HOSPITALIST

## 2022-11-09 PROCEDURE — 82610 CYSTATIN C: CPT

## 2022-11-09 PROCEDURE — 97110 THERAPEUTIC EXERCISES: CPT | Mod: GP

## 2022-11-09 PROCEDURE — 250N000012 HC RX MED GY IP 250 OP 636 PS 637

## 2022-11-09 PROCEDURE — 99214 OFFICE O/P EST MOD 30 MIN: CPT | Performed by: HOSPITALIST

## 2022-11-09 PROCEDURE — 96372 THER/PROPH/DIAG INJ SC/IM: CPT | Performed by: HOSPITALIST

## 2022-11-09 PROCEDURE — 76770 US EXAM ABDO BACK WALL COMP: CPT

## 2022-11-09 PROCEDURE — 85027 COMPLETE CBC AUTOMATED: CPT

## 2022-11-09 PROCEDURE — 97166 OT EVAL MOD COMPLEX 45 MIN: CPT | Mod: GO

## 2022-11-09 PROCEDURE — 83735 ASSAY OF MAGNESIUM: CPT | Performed by: HOSPITALIST

## 2022-11-09 PROCEDURE — 36415 COLL VENOUS BLD VENIPUNCTURE: CPT

## 2022-11-09 PROCEDURE — 97116 GAIT TRAINING THERAPY: CPT | Mod: GP

## 2022-11-09 PROCEDURE — 97161 PT EVAL LOW COMPLEX 20 MIN: CPT | Mod: GP

## 2022-11-09 PROCEDURE — 97535 SELF CARE MNGMENT TRAINING: CPT | Mod: GO

## 2022-11-09 PROCEDURE — 81003 URINALYSIS AUTO W/O SCOPE: CPT

## 2022-11-09 PROCEDURE — 250N000012 HC RX MED GY IP 250 OP 636 PS 637: Performed by: HOSPITALIST

## 2022-11-09 RX ORDER — METHYLPREDNISOLONE 4 MG/1
4 TABLET ORAL 2 TIMES DAILY
Status: DISCONTINUED | OUTPATIENT
Start: 2022-11-09 | End: 2022-11-10 | Stop reason: HOSPADM

## 2022-11-09 RX ADMIN — METHOCARBAMOL 500 MG: 500 TABLET, FILM COATED ORAL at 14:31

## 2022-11-09 RX ADMIN — METHYLPREDNISOLONE 4 MG: 4 TABLET ORAL at 12:19

## 2022-11-09 RX ADMIN — OXYCODONE HYDROCHLORIDE 5 MG: 5 TABLET ORAL at 09:29

## 2022-11-09 RX ADMIN — Medication 2 SPRAY: at 21:56

## 2022-11-09 RX ADMIN — ACETAMINOPHEN 975 MG: 325 TABLET, FILM COATED ORAL at 20:09

## 2022-11-09 RX ADMIN — INSULIN ASPART 2 UNITS: 100 INJECTION, SOLUTION INTRAVENOUS; SUBCUTANEOUS at 06:59

## 2022-11-09 RX ADMIN — ACETAMINOPHEN 975 MG: 325 TABLET, FILM COATED ORAL at 04:00

## 2022-11-09 RX ADMIN — ALLOPURINOL 100 MG: 100 TABLET ORAL at 08:39

## 2022-11-09 RX ADMIN — BENZOCAINE AND MENTHOL 1 LOZENGE: 15; 3.6 LOZENGE ORAL at 04:00

## 2022-11-09 RX ADMIN — ACETAMINOPHEN 975 MG: 325 TABLET, FILM COATED ORAL at 12:19

## 2022-11-09 RX ADMIN — OXYCODONE HYDROCHLORIDE 2.5 MG: 5 TABLET ORAL at 04:00

## 2022-11-09 RX ADMIN — OXYCODONE HYDROCHLORIDE 5 MG: 5 TABLET ORAL at 18:55

## 2022-11-09 RX ADMIN — INSULIN ASPART 3 UNITS: 100 INJECTION, SOLUTION INTRAVENOUS; SUBCUTANEOUS at 17:43

## 2022-11-09 RX ADMIN — DIPHENHYDRAMINE HYDROCHLORIDE 12.5 MG: 25 SOLUTION ORAL at 17:44

## 2022-11-09 RX ADMIN — METHYLPREDNISOLONE 4 MG: 4 TABLET ORAL at 21:56

## 2022-11-09 RX ADMIN — SENNOSIDES AND DOCUSATE SODIUM 1 TABLET: 50; 8.6 TABLET ORAL at 21:57

## 2022-11-09 RX ADMIN — METHOCARBAMOL 500 MG: 500 TABLET, FILM COATED ORAL at 20:09

## 2022-11-09 RX ADMIN — POLYETHYLENE GLYCOL 3350 17 G: 17 POWDER, FOR SOLUTION ORAL at 08:39

## 2022-11-09 RX ADMIN — GABAPENTIN 600 MG: 300 CAPSULE ORAL at 21:56

## 2022-11-09 RX ADMIN — SENNOSIDES AND DOCUSATE SODIUM 1 TABLET: 50; 8.6 TABLET ORAL at 08:39

## 2022-11-09 RX ADMIN — AMLODIPINE BESYLATE 10 MG: 10 TABLET ORAL at 08:39

## 2022-11-09 RX ADMIN — ZOLPIDEM TARTRATE 10 MG: 5 TABLET ORAL at 22:01

## 2022-11-09 RX ADMIN — OXYCODONE HYDROCHLORIDE 5 MG: 5 TABLET ORAL at 14:31

## 2022-11-09 RX ADMIN — OXYCODONE HYDROCHLORIDE 5 MG: 5 TABLET ORAL at 23:02

## 2022-11-09 RX ADMIN — ATORVASTATIN CALCIUM 20 MG: 10 TABLET, FILM COATED ORAL at 21:56

## 2022-11-09 RX ADMIN — INSULIN ASPART 1 UNITS: 100 INJECTION, SOLUTION INTRAVENOUS; SUBCUTANEOUS at 12:18

## 2022-11-09 ASSESSMENT — ACTIVITIES OF DAILY LIVING (ADL)
ADLS_ACUITY_SCORE: 23

## 2022-11-09 NOTE — PROGRESS NOTES
11/09/22 0952   Appointment Info   Signing Clinician's Name / Credentials (OT) ZULMA Neal   Quick Adds   Quick Adds Certification   Living Environment   People in Home alone   Current Living Arrangements other (see comments)  ([facility])   Transportation Anticipated family or friend will provide   Living Environment Comments Pt can stay at her son's house. No AD. Pt has   Self-Care   Usual Activity Tolerance good   Current Activity Tolerance good   Equipment Currently Used at Home none   Fall history within last six months no   Activity/Exercise/Self-Care Comment Pt is IND w/ ADLs and IADLs at baseline   General Information   Onset of Illness/Injury or Date of Surgery 11/08/22   Referring Physician Leno Recinos MD   Additional Occupational Profile Info/Pertinent History of Current Problem lumbar decomp   Existing Precautions/Restrictions spinal   Cognitive Status Examination   Orientation Status orientation to person, place and time   Visual Perception   Visual Impairment/Limitations corrective lenses full-time   Sensory   Sensory Quick Adds sensation intact   Pain Assessment   Patient Currently in Pain Yes, see Vital Sign flowsheet   Posture   Posture not impaired   Range of Motion Comprehensive   General Range of Motion no range of motion deficits identified   Strength Comprehensive (MMT)   General Manual Muscle Testing (MMT) Assessment no strength deficits identified   Muscle Tone Assessment   Muscle Tone Quick Adds No deficits were identified   Coordination   Upper Extremity Coordination No deficits were identified   Bed Mobility   Bed Mobility scooting/bridging;supine-sit;sit-supine   Comment (Bed Mobility) SBA for bed mobility   Transfers   Transfers bed-chair transfer;toilet transfer;shower transfer   Transfer Comments SBA-CGA for transfers   Activities of Daily Living   BADL Assessment/Intervention bathing;lower body dressing;toileting;grooming   Clinical Impression   Criteria for  Skilled Therapeutic Interventions Met (OT) Yes, treatment indicated   OT Diagnosis decreased ADLs   Influenced by the following impairments lumbar decomp   OT Problem List-Impairments impacting ADL activity tolerance impaired;range of motion (ROM);post-surgical precautions   Assessment of Occupational Performance 3-5 Performance Deficits   Identified Performance Deficits LE dressing, bed mobility, transfers   Planned Therapy Interventions (OT) ADL retraining;bed mobility training;transfer training   Clinical Decision Making Complexity (OT) moderate complexity   Risk & Benefits of therapy have been explained evaluation/treatment results reviewed;patient   OT Total Evaluation Time   OT Eval, Moderate Complexity Minutes (70392) 10   OT Goals   Therapy Frequency (OT) One time eval and treatment   OT Predicted Duration/Target Date for Goal Attainment 11/09/22   OT Goals Lower Body Dressing;Bed Mobility;Transfers   OT: Lower Body Dressing Modified independent;using adaptive equipment;within precautions;Goal Met;Completed   OT: Bed Mobility Modified independent;supine to/from sitting;rolling;bridging;within precautions;Goal Met;Completed   OT: Transfer Supervision/stand-by assist;with assistive device;within precautions;Goal Met;Completed  (walkin sohwer)   Interventions   Interventions Quick Adds Self-Care/Home Management   Self-Care/Home Management   Self-Care/Home Mgmt/ADL, Compensatory, Meal Prep Minutes (68597) 28   Symptoms Noted During/After Treatment (Meal Preparation/Planning Training) increased pain   Treatment Detail/Skilled Intervention Pt edu on spinal px - verbalized understanding and able to follow throughout session. Pt edu on compensatory strategies for LE dressing w/in spinal px using reacher - pt completed Mod I w/ AE. STS w/ SBA and amb. 20 ft w/ FWW Mod I to BR. Pt edu on safe RTS/walkin shower transfers - completed SBA. Pt instructed on log roll for bed mobility - completed Mod I. Pt given handout on  completing ADLs w/in spinal px. ended sessoin in recliner   OT Discharge Planning   OT Plan DC OT   OT Discharge Recommendation (DC Rec) (S)  home with assist   OT Rationale for DC Rec Pt tolerating therapy well. Pt will be staying w/ son and DIL through the weekend to assist as needed w/ ADLs. Good setup at son's house.   OT Brief overview of current status Mod I for ADLs   Total Session Time   Timed Code Treatment Minutes 28   Total Session Time (sum of timed and untimed services) 38      [facility]  OUTPATIENT OCCUPATIONAL THERAPY  EVALUATION  PLAN OF TREATMENT FOR OUTPATIENT REHABILITATION  (COMPLETE FOR INITIAL CLAIMS ONLY)  Patient's Last Name, First Name, M.I.  YOB: 1940  Katt Dsouza                          Provider's Name  [facility] Medical Record No.  [id number]                             Onset Date:  11/08/22   Start of Care Date:      Type:     ___PT   _X_OT   ___SLP Medical Diagnosis:                       OT Diagnosis:  decreased ADLs Visits from SOC:  1     See note for plan of treatment, functional goals and certification details    I CERTIFY THE NEED FOR THESE SERVICES FURNISHED UNDER        THIS PLAN OF TREATMENT AND WHILE UNDER MY CARE     (Physician co-signature of this document indicates review and certification of the therapy plan).

## 2022-11-09 NOTE — PROGRESS NOTES
"[facility] Progress Note    POD #: 1  S/P: Procedure(s):  LUMBAR 3 - LUMBAR 4 BILATERAL MEDIAL FACETECTOMY, DECOMPRESSION PARTIAL DISCECTOMY    SUBJECTIVE:  Patient lying in bed. Appears comfortable. Incision pain that is well controlled.  Lower extremity pain noticeable improved compared with prior to surgery. Does endorse some LLE radiculopathy and paresthesias anterior thigh/calf/foot.  No new extremity pain, numbness/tingling, or weakness.  (-) N/V  (+) flatus    OBJECTIVE:  /65 (BP Location: Right arm)   Pulse 68   Temp 98  F (36.7  C) (Oral)   Resp 18   Ht 1.626 m (5' 4\")   Wt 91.2 kg (201 lb)   SpO2 96%   BMI 34.50 kg/m      Intake/Output Summary (Last 24 hours) at 11/9/2022 1009  Last data filed at 11/9/2022 0654  Gross per 24 hour   Intake 1720 ml   Output 380 ml   Net 1340 ml     Drains: output 45, 35 cc    PHYSICAL EXAM:  Incision: Covered. Gauze dressing in place. Appears clean, dry, intact.   Awake, alert, and oriented. Conversational. Non-labored breathing.    LOWER EXTREMITIES  RLE   5/5 hip flexor  5/5 Quad  5/5 Tib Ant  5/5 EHL  5/5 Gastroc Soleus  RLE L4-S1 intact to light touch    LLE   5/5 hip flexor  5/5 Quad  5/5 Tib Ant  5/5 EHL  5/5 Gastroc Soleus  LLE L4-S1 intact to light touch    Bilateral calves non-tender, non-edematous, non erythematous, no warmth.    LABS:  No components found for: HEMOGLOBIN    ASSESSMENT:  S/P Procedure(s):  LUMBAR 3 - LUMBAR 4 BILATERAL MEDIAL FACETECTOMY, DECOMPRESSION PARTIAL DISCECTOMY    PLAN:  1. Internal med following. Appreciate their recs.  2. Continue with pain management. medrol 4mg BID for 4 doses started for LLE pain.  3. PT/OT- ambulate as tolerated   4. Diet per protocol    May pull Hemovac drain when less than 2 consecutive <30cc outputs. Keep drain in for now. Likely remove tomorrow AM.    Jovana Plaza PA-C  [facility]   [phone]   November 9, 2022 at 10:10 AM      "

## 2022-11-09 NOTE — PROGRESS NOTES
Patient vital signs are at baseline: No,  Reason:  Pt requiring 1-2 LPM of O2. Pt's throat irritated. PRN Lozenges utilized and effective.   Patient able to ambulate as they were prior to admission or with assist devices provided by therapies during their stay:  Yes  Patient MUST void prior to discharge:  Yes  Patient able to tolerate oral intake:  Yes  Pain has adequate pain control using Oral analgesics:  Yes  Does patient have an identified :  Yes  Has goal D/C date and time been discussed with patient:  Yes

## 2022-11-09 NOTE — CONSULTS
"ACUPUNCTURIST TREATMENT NOTE    Name: Katt Dsouza  :  1940  MRN:  [id number]    Acupuncture Treatment  Patient Type: Orthopedic  Intervention Reason: Pain  Pain Location: low back pain worse on (L)  Pre-session Pain Ratin  Post-session Pain Ratin  Patient complaint:: low back pain worse on (L)  Initial insertions: (R) (Louise Dunn, Avtar Nieves); (L) LI 4, Si Eckert Manolo, LI 10  Number of needles inserted: 10  Number of needles removed: 10         \"Risks and benefits of acupuncture were discussed with patient. Consent for treatment was given. We thank you for the referral.\"     Glenny Fish L.Ac.     Date:  2022  Time:  4:02 PM    "

## 2022-11-09 NOTE — PROGRESS NOTES
"PRIMARY DIAGNOSIS: \"GENERIC\" NURSING  OUTPATIENT/OBSERVATION GOALS TO BE MET BEFORE DISCHARGE:  1. ADLs back to baseline: yes, minimal assistance with ADLs    2. Activity and level of assistance: assistance of stand by guard assist, walker, and gait belt for safety    3. Pain status: pain managed with oral pain medications and ice therapy    4. Return to near baseline physical activity: yes     Discharge Planner Nurse   Safe discharge environment identified: yes home with family  Barriers to discharge: yes, awaiting clearance from nephrology. Will stay for one more night to assess labs in AM.        Entered by: Michell Whipple RN 11/09/2022 12:50 PM     Please review provider order for any additional goals.   Nurse to notify provider when observation goals have been met and patient is ready for discharge.  "

## 2022-11-09 NOTE — PROGRESS NOTES
Care Management Follow Up    Length of Stay (days): 0    Expected Discharge Date: 11/10/2022     Concerns to be Addressed:       Patient plan of care discussed at interdisciplinary rounds: Yes    Anticipated Discharge Disposition:  Return home     Additional Information:  Reviewed at discharge. Pt to return home with family. No CM needs at this time.       ARMANI Galicia

## 2022-11-09 NOTE — PROGRESS NOTES
[facility] Orthopedic Brief Operative Note    Pre-operative diagnosis: Disc degeneration, lumbar [M51.36]  Spinal stenosis, lumbar [M48.061]   Post-operative diagnosis: Same   Procedure: Bilateral L3-4 medial facetectomy and decompression, Left L3-4 laminotomy and partial discecotmy   Surgeon: Leno Recinos   Assistant(s): Jarod Vergara PA-C   Anesthesia: General endotracheal anesthesia   Estimated blood loss: 5cc   Total IV fluids: (See anesthesia record)   Blood transfusion: No transfusion was given during surgery   Total urine output: (See anesthesia record)   Drains: Hemovac   Specimens: None   Implants: None   Findings: L3-4 disc herniation and spinal stenosis   Complications: None   Condition: Stable   Weight bearing status: Weight bearing as tolerated   Activity: Activity as tolerated  Patient may move about with assist as indicated or with supervision   Orthotic management: Not applicable   Comments: See dictated operative report for full details     Juice Vergara PA-C  [facility]

## 2022-11-09 NOTE — PLAN OF CARE
Patient vital signs are at baseline: No,  Reason:  Patient still requiring 1-2L O2. Has a dry cough/irritated throat. Lozenges ordered.  Patient able to ambulate as they were prior to admission or with assist devices provided by therapies during their stay:  Yes  Patient MUST void prior to discharge:  Yes  Patient able to tolerate oral intake:  Yes  Pain has adequate pain control using Oral analgesics:  Yes  Does patient have an identified :  Yes  Has goal D/C date and time been discussed with patient:  Yes

## 2022-11-09 NOTE — PROGRESS NOTES
"PRIMARY DIAGNOSIS: \"GENERIC\" NURSING  OUTPATIENT/OBSERVATION GOALS TO BE MET BEFORE DISCHARGE:  1. ADLs back to baseline: no    2. Activity and level of assistance: assistance of 1 with walker and gait belt    3. Pain status: intermittent pain managed with oral pain medication    4. Return to near baseline physical activity: yes, patient ambulating well with minimal assistance.     Discharge Planner Nurse   Safe discharge environment identified: Patient plans to discharge home with assistance from family but patient is doing well and has already had therapy sign off.  Barriers to discharge: Patient needs assessment from nephrology due to acute kidney injury and needs to have drain removed when output is within order limits.       Entered by: Michell Whipple RN 11/09/2022 9:21 AM     Please review provider order for any additional goals.   Nurse to notify provider when observation goals have been met and patient is ready for discharge.  "

## 2022-11-09 NOTE — CONSULTS
RENAL CONSULT NOTE    REQUESTING PHYSICIAN: Dr. Jerry    REASON FOR CONSULT: AFRICA/Post-OP    ASSESSMENT/PLAN:    PLAN:   -Avoid nephrotoxins/avoid hypotension/renally dose medications/strict IO/Daily wts  -HOLD PTA Losartan   -HOLD hydrochlorothiazide   -Renal US ordered, pt reports passing stones   -BMP in AM    Non-oliguric AFRICA on CKD 2/3: Baseline cr ~1.1, eGFR~55-65% likely etiology is multifactorial including Hypertensive nephrosclerosis, DMN, and age.  Cr keely to 1.3 with suspected post-operative AFRICA, +/- ARB, +/- chronic NSAID use PTA. Hx of + microalbuminuria Hx. Prior UAs bland. No prior renal imaging in chart, will get imaging today with AFRICA.    HTN: PTA on Amlodipine 10 mg daily , hydrochlorothiazide 50 mg PO daily, and losartan 100 mg Daily. HOLD ARB with AFRICA. Pain management per S.    Anemia: last Hg 13.7. CBC pending.     DDD s/p L3-4 Spinal surgery: post op cares/PT/OT/Pain management per [facility]    HFpEF    DM2: Management per [facility]. HOLD metformin if GFR <30    GERD: pepcid PTA    Gout: on allopurinol    HLD: on statin    Asthma: PTA inhalers, Nebs    Pain: Post-operative chronic pain. Was using NSAIDS daily for back pain PTA. Advised pt to avoid NSAIDS. Only use Tylenol, and prescribed medication. IF needing more aggressive pain medication once discharging I would recommend tramadol as an alternative.       HPI: Katt is an 83 y/o F with a PMH significant for HTN, HLD, Gout, DM2, HFpEF, GERD, DDD and underwent L3-4 spine surgery. Renal consulted for post-operative AFRICA.     Pt sitting up in chair  Very interactive  Denies N, V, C, D, SOB, fever, rash or CP  Pain controlled post procedure  Denies edema  Reports passing urine stones in the recent past  Tells me she has renal imaging at [facility] in the past and her PCP said she has Renal Calculi.   She denies headache or feeling dizzy  Discussed current level of renal function, AFRICA, and plan to Avoid NSAIDS for pain, HOLD ARB/HCTZ, S/P IVF, and will checK  Renal US, and BMP in AM  Answered all questions.       REVIEW OF SYSTEMS:  Complete 12 point review of systems was negative other than those noted in the HPI      Past Medical History:   Diagnosis Date     Diabetes (H)      Hypertension      Uncomplicated asthma        No current facility-administered medications on file prior to encounter.  albuterol (PROAIR HFA/PROVENTIL HFA/VENTOLIN HFA) 108 (90 Base) MCG/ACT inhaler, Inhale 2 puffs into the lungs every 4 hours as needed for shortness of breath / dyspnea or wheezing  allopurinoL (ZYLOPRIM) 100 MG tablet, [ALLOPURINOL (ZYLOPRIM) 100 MG TABLET] Take 100 mg by mouth daily.  amLODIPine (NORVASC) 10 MG tablet, Take 10 mg by mouth daily  atorvastatin (LIPITOR) 20 MG tablet, [ATORVASTATIN (LIPITOR) 20 MG TABLET] Take 20 mg by mouth at bedtime.  famotidine (PEPCID) 20 MG tablet, Take 20 mg by mouth daily as needed  fluticasone (FLONASE) 50 MCG/ACT nasal spray, SPRAY 2 SPRAYS IN NOSTRIL 2 TIMES DAILY  gabapentin (NEURONTIN) 300 MG capsule, Take 600 mg by mouth At Bedtime   hydrochlorothiazide (HYDRODIURIL) 25 MG tablet, Take 50 mg by mouth daily  losartan (COZAAR) 100 MG tablet, Take 100 mg by mouth daily  metFORMIN (GLUCOPHAGE XR) 500 MG 24 hr tablet, Take 500 mg by mouth daily (with breakfast)  metFORMIN (GLUCOPHAGE XR) 500 MG 24 hr tablet, Take 1,000 mg by mouth daily (with dinner)  traMADol (ULTRAM) 50 MG tablet, Take 50 mg by mouth daily as needed for severe pain  TRELEGY ELLIPTA 100-62.5-25 MCG/INH oral inhaler, Inhale 1 puff into the lungs daily  zolpidem (AMBIEN) 10 MG tablet, Take 1 tablet (10 mg) by mouth nightly as needed for sleep  simethicone (MYLICON) 80 MG chewable tablet, Take 80 mg by mouth every 6 hours as needed for flatulence or cramping        acetaminophen (TYLENOL) 325 MG tablet  methocarbamol (ROBAXIN) 500 MG tablet  oxyCODONE (ROXICODONE) 5 MG tablet  senna-docusate (SENOKOT-S/PERICOLACE) 8.6-50 MG tablet        ALLERGIES/SENSITIVITIES:  Allergies    Allergen Reactions     Amoxicillin      Other reaction(s): hives     Animal Dander Unknown     Penicillin G      Penicillin V      Other reaction(s): hives     Penicillins Hives     Sulfa Drugs Hives     Social History     Tobacco Use     Smoking status: Former     Types: Cigarettes     Quit date: 1995     Years since quittin.8     Smokeless tobacco: Never   Substance Use Topics     Alcohol use: Yes     Comment: rare     Drug use: Never             PHYSICAL EXAM:  Physical Exam   Temp: 98  F (36.7  C) Temp src: Oral BP: 137/65 Pulse: 68   Resp: 18 SpO2: 96 % O2 Device: None (Room air) Oxygen Delivery: 2 LPM  Vitals:    22 1302 22 1320   Weight: 91.2 kg (201 lb) 91.2 kg (201 lb)     Vital Signs with Ranges  Temp:  [97  F (36.1  C)-98.7  F (37.1  C)] 98  F (36.7  C)  Pulse:  [65-81] 68  Resp:  [12-30] 18  BP: (110-160)/(54-69) 137/65  SpO2:  [91 %-97 %] 96 %  I/O last 3 completed shifts:  In: 1720 [P.O.:720; I.V.:1000]  Out: 380 [Urine:300; Drains:80]    Patient Vitals for the past 72 hrs:   Weight   22 1320 91.2 kg (201 lb)   22 1302 91.2 kg (201 lb)     PHYSICAL EXAM:  GEN: NAD, up in chair  CV: RRR, + Murmur  Lung: clear and equal; no extra sounds  Ab: soft and NT; not distended; normal bs, drain present  Ext: + edema and well perfused  Skin: no rash    Laboratory:   No results for input(s): WBC, RBC, HGB, HCT, PLT in the last 168 hours.    Basic Metabolic Panel:  Recent Labs   Lab 22  0657 22  0532 22  2230 22  1823 22  1326   NA  --  137  --   --   --    POTASSIUM  --  4.1  --   --   --    CHLORIDE  --  102  --   --   --    CO2  --  28  --   --   --    BUN  --  22  --   --   --    CR  --  1.38*  --   --   --    * 233* 335* 160* 120*   MARIO  --  8.7  --   --   --        INRNo lab results found in last 7 days.    Recent Labs   Lab Test 22  0532 10/28/22  1414   POTASSIUM 4.1 4.0   CHLORIDE 102 104   BUN 22 25.7*      Recent Labs   Lab Test  05/19/22  1428 01/15/22  1709 05/18/20  0946   ALBUMIN 4.1  --  4.1   BILITOTAL 0.3  --  0.4   ALT 28  --  43   AST 25  --  26   PROTEIN  --  Negative  --        Personally reviewed today's laboratory studies      Thank you for involving us in the care of this patient. We will continue to follow along with you.      Celestina Quintanilla [facility]  [facility]  [phone]

## 2022-11-09 NOTE — PROGRESS NOTES
[facility] [place]    Medicine Progress Note - Hospitalist Service    Date of Admission:  11/8/2022    Assessment & Plan   Katt Dsouza is a 82 year old old female with a history of asthma, HTN, HLD, gout, DM2, HFpEF, GERD, DDD underwent L3-4 spine surgery. [facility] service was asked to evaluate patient for postoperative medical management as follows below. Please resume the home medications as reconciled and further noted below with ordered hold parameters.  Vital signs have been stable post-operatively including hemodynamically stable blood pressure and heart rate. Thank you for this consult; we will continue to follow this patient until discharge. New AFRICA for which nephrology has been consulted. Discussed with RN.     AFRICA  -Hold potentially nephrogenic or nephrotoxic medications.   -Home HCTZ, losartan, and metformin being held.  -Nephrology consultation appreciated.   -Cr baseline appears ~0.9; Cr is 1.4 today  -Judicious with fluids given HFpEF history and O2 needs at 1-2 L  -Recheck BMP and Cr tomorrow AM.   -If not improved or normalized, consider further evaluation with FeNa and renal ultrasound.    HTN  HFpEF  -Order home cardiac medications with the written tiered hold parameters given risk for post-operative hypotension. Given ACEi/ARB/diuretic medication, ordered creatine, potassium, and magnesium to evaluate renal function and electrolytes, respectively.   -AFRICA noted on labs, see above.     DM2  -Hold home oral anti-glycemics post-op given risk for acute hypoglycemia with sulfonylureas and nephrotoxicity if contrast is utilized in any emergent imaging with biguanides; utilize insulin corrections three times a day and at bedtime. Restart home oral anti-glycemics step-wise as tolerated once patient is eating as per their normal, usual diet.  -Hold metformin with AFRICA.  -Carb-controlled/diabetic diet.      HLD  -Order home statin.    GERD  -Order home antacids/medications. Utilize formulary  while inpatient.    Gout  -Order home medications.     Asthma  -Order home inhalers and medications.   -Nebulizer and RT pulmonary hygiene as needed.     S/p L3-4 Surgery of Spine  DDD  Acute Post-Op Pain  -Agree with current pain control regimen; consider acute pain team consultation if increasingly difficult to control or proves refractory.   -PT evaluation.   -OT evaluation.  -IS frequently, initially every hour while awake as tolerated. Directly encouraged and discussed.      Post-Op DVT Prophylaxis  -As per primary surgery team.      Procedure(s):  LUMBAR 3 - LUMBAR 4 BILATERAL MEDIAL FACETECTOMY, DECOMPRESSION PARTIAL DISCECTOMY  Post-operative Day: Day of Surgery  Code status:No Order     Type of Anesthesia   General    Estimated Blood Loss:  * No values recorded between 11/8/2022  4:43 PM and 11/8/2022  6:06 PM *    Hospital Problem List   No problem-specific Assessment & Plan notes found for this encounter.    Active Problems:    * No active hospital problems. *      -Reviewed the patient's preoperative H and P and updated missing elements.  -Home medication reconciliation has been reviewed.  Medications have been ordered as noted from the home list and changes are documented above          Diet: Moderate Consistent Carb (60 g CHO per Meal) Diet    DVT Prophylaxis: Defer to primary service  Gil Catheter: Not present  Central Lines: None  Cardiac Monitoring: None  Code Status: Full Code      Disposition Plan      Expected Discharge Date: 11/10/2022        Discharge Comments: pending drain removal        The patient's care was discussed with the Patient and Patient's Family.    James Jerry MD  Hospitalist Service  [facility]  Securely message with the Vocera Web Console (learn more here)  Text page via [employer] Paging/Directory         Clinically Significant Risk Factors Present on Admission                  # Hypertension: home medication list includes antihypertensive(s)     #  "Obesity: Estimated body mass index is 34.5 kg/m  as calculated from the following:    Height as of this encounter: 1.626 m (5' 4\").    Weight as of this encounter: 91.2 kg (201 lb).           ______________________________________________________________________    Interval History   No acute events overnight.    No report of chest pain, shortness of breath, or other new complaints.   O2 later weaned off to room air from 2 L.     In-room with Orthopedics on first visit.     Returned for second visit with PT/OT in-room.     Discussed POC with RN and can update patient pending nephrology eval and further w/u.     Data reviewed today: I reviewed all medications, new labs and imaging results over the last 24 hours. I personally reviewed .    Physical Exam   Vital Signs: Temp: 97.6  F (36.4  C) Temp src: Oral BP: 116/56 Pulse: 68   Resp: 18 SpO2: 97 % O2 Device: Nasal cannula Oxygen Delivery: 2 LPM  Weight: 201 lbs 0 oz  GENERAL:  Alert, appears comfortable, in no acute distress, appears stated age, now on room air   HEAD:  Normocephalic, without obvious abnormality, atraumatic   EYES:  Conjunctiva/corneas clear, no scleral icterus, EOM's appears intact grossly   NOSE: Nares normal, septum midline, mucosa normal, no drainage   THROAT: Lips, mucosa, and tongue appear normal   NECK: Symmetrical, trachea appears midline   BACK:   Symmetric, no curvature noted   LUNGS:   Symmetric chest rise on inhalation, respirations unlabored   CHEST WALL:  No apparent deformity   HEART:  No thrills or heaves visualized   ABDOMEN:   No masses or organomegaly apparent; non-scaphoid   EXTREMITIES: Extremities appear normal, atraumatic, no cyanosis   SKIN: No exanthems in the visualized areas   NEURO: Alert and oriented, moves all four extremities freely and spontaneously   PSYCH: Cooperative, behavior is appropriate     In-room with Orthopedics on first visit.     Returned for second visit with PT/OT in-room.     Will defer third visit for " more detailed exam unless clinical status changes.     Data   Recent Labs   Lab 11/09/22  0657 11/09/22  0532 11/08/22  2230   NA  --  137  --    POTASSIUM  --  4.1  --    CHLORIDE  --  102  --    CO2  --  28  --    BUN  --  22  --    CR  --  1.38*  --    ANIONGAP  --  7  --    MARIO  --  8.7  --    * 233* 335*     Recent Results (from the past 24 hour(s))   XR Surgery DAVID  Fluoro G/T 5 Min    Narrative    This exam was marked as non-reportable because it will not be read by a   radiologist or a [place] non-radiologist provider.           Medications       acetaminophen  975 mg Oral Q8H     allopurinol  100 mg Oral Daily     amLODIPine  10 mg Oral Daily     atorvastatin  20 mg Oral At Bedtime     clindamycin  900 mg Intravenous Q8H     fluticasone  2 spray Nasal BID     gabapentin  600 mg Oral At Bedtime     hydrochlorothiazide  50 mg Oral Daily     insulin aspart  1-7 Units Subcutaneous TID AC     insulin aspart  1-5 Units Subcutaneous At Bedtime     losartan  100 mg Oral Daily     polyethylene glycol  17 g Oral Daily     scopolamine   Transdermal Q8H     senna-docusate  1 tablet Oral BID     sodium chloride (PF)  3 mL Intracatheter Q8H

## 2022-11-09 NOTE — PROGRESS NOTES
11/09/22 0739   Appointment Info   Signing Clinician's Name / Credentials (PT) Yara Quiroz PT   Living Environment   People in Home alone   Current Living Arrangements other (see comments)  (town home)   Home Accessibility stairs to enter home;stairs within home   Number of Stairs, Main Entrance 1   Number of Stairs, Within Home, Primary other (see comments)  (8+8 to bedroom)   Stair Railings, Within Home, Primary railings on both sides of stairs   Living Environment Comments Pt reports she can go to her son's at discharge and he does not have stairs.   Self-Care   Usual Activity Tolerance good   Current Activity Tolerance good   Equipment Currently Used at Home none  (Has FWW at home)   Activity/Exercise/Self-Care Comment Pt reports independent with all mobility without AD at baseline. Still works as a .   General Information   Onset of Illness/Injury or Date of Surgery 11/08/22   Referring Physician Dr Leno Recinos   Patient/Family Therapy Goals Statement (PT) Go to [place].   Pertinent History of Current Problem (include personal factors and/or comorbidities that impact the POC) Admit for L3-4 facetectomy and decompression   Existing Precautions/Restrictions spinal   Weight-Bearing Status - LLE weight-bearing as tolerated   Weight-Bearing Status - RLE weight-bearing as tolerated   Pain Assessment   Patient Currently in Pain Yes, see Vital Sign flowsheet   Bed Mobility   Comment, (Bed Mobility) Pt up in chair.   Transfers   Impairments Contributing to Impaired Transfers pain;decreased strength   Comment, (Transfers) Sit<>stand recliner to FWW SBA.   Gait/Stairs (Locomotion)   [place] Level (Gait) supervision   Assistive Device (Gait) walker, front-wheeled   Distance in Feet (Required for LE Total Joints) 100'x1   Pattern (Gait) step-through   Comment, (Gait/Stairs) Up/down 4 steps w/B rails CGA.   Clinical Impression   Criteria for Skilled Therapeutic Intervention Yes, treatment  indicated   PT Diagnosis (PT) Impaired functional mobility   Influenced by the following impairments weakness, pain   Functional limitations due to impairments transfers, gait, stairs   Clinical Presentation (PT Evaluation Complexity) Stable/Uncomplicated   Clinical Presentation Rationale Presents as medically diagnosed.   Clinical Decision Making (Complexity) low complexity   Planned Therapy Interventions (PT) gait training;home exercise program;patient/family education;stair training;transfer training   Anticipated Equipment Needs at Discharge (PT) walker, rolling  (Has one at home)   Risk & Benefits of therapy have been explained evaluation/treatment results reviewed;care plan/treatment goals reviewed;risks/benefits reviewed;participants voiced agreement with care plan;participants included;patient   Plan of Care Review   Plan of Care Reviewed With patient   Physical Therapy Goals   PT Frequency One time eval and treatment only   PT Predicted Duration/Target Date for Goal Attainment 11/09/22   PT Goals Transfers;Gait;Stairs;PT Goal 1   PT: Transfers Modified independent;Sit to/from stand;Assistive device;Within precautions;Goal Met;Completed   PT: Gait Modified independent;Assistive device;Rolling walker;Within precautions;100 feet;Goal Met;Completed   PT: Stairs Supervision/stand-by assist;8 stairs;Within precautions;Rail on both sides;Goal Met;Completed   PT: Goal 1 Independent with post-op spine HEP following written handout. Goal met. Completed.   PT Discharge Planning   PT Plan D/C PT due to goals met.   PT Discharge Recommendation (DC Rec) (S)  home with assist   PT Rationale for DC Rec Pt doing well with all mobility. Plans to discharge to son's home for a few days as he does not have any stairs.   PT Brief overview of current status Mod I with gait and transfers.

## 2022-11-09 NOTE — PLAN OF CARE
Occupational Therapy Discharge Summary    Reason for therapy discharge:    All goals and outcomes met, no further needs identified.    Progress towards therapy goal(s). See goals on Care Plan in [facility] electronic health record for goal details.  Goals met    Therapy recommendation(s):    No further therapy is recommended.

## 2022-11-09 NOTE — PROCEDURES
PREOPERATIVE DIAGNOSES:  1. 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  2. Left L3-4 disc herniation  3.         Failure of conservative management.     POSTOPERATIVE DIAGNOSES:  1. L3-4   spinal stenosis with neurogenic claudication.  2. Left L3-4 disc herniation.  3.         Failure of conservative management.     PROCEDURES PERFORMED:  1. Left L3-4  laminotomy.  2. Bilateral L3-4  medial facetectomies and bilateral lateral recess decompression.   3.         Left L3-4 partial discectomy    ATTENDING SURGEON:  Leno Recinos M.D.     FIRST ASSISTANT:  Jarod Vergara P.A.-C. - Assistance of JADA Morales, was particularly important in the performance of the procedure including patient positioning, soft tissue retraction, and successful performance of the decompression procedure as well as closure.     ESTIMATED BLOOD LOSS:  5 cc.    COMPLICATIONS:  None.    DETAILS OF PROCEDURE/INTRODUCTION:  The patient is a very pleasant female patient who came to the clinic with a longstanding history of low back pain and left greater than right bilateral thigh pain consistent with imaging studies showing severe L3-4  spinal stenosis. The patient had exhausted nonoperative measures and continued to have debilitating symptoms. Therefore, I had a discussion with her regarding surgery and specifically discussed the risks including but not limited to death, infection, dural tear, nerve injury, and nonresolution of symptoms, among others, and she accepted and wished to proceed.     The patient was brought to the operating room and placed under general endotracheal anesthetic without complications. Antibiotics were given intravenously within 1 hour of incision. She was then placed prone on the Boston table ensuring that all nerve areas and bony areas were padded and free of pressure. The low back area was then prepped and draped in routine sterile manner. After appropriate timeout, I placed a marking needle into  the skin and subcutaneous tissue and took intraoperative imaging to plan my incision. Thereafter, I made an incision over the L3 and L4  spinous processes and carried out a subperiosteal dissection on the left side to expose the interlaminar spaces. I took intraoperative imaging to confirm my levels. I then used a amauri to amauri down the medial descending facet of L3. I used a Kerrison rongeur to perform a laminotomy and medial facetectomy all the way to the medial border of the L3 and L4 pedicles on the left side. I then, through the same laminotomy, performed a contralateral lateral recess decompression and medial facetectomy all the way to the medial border of the L3 and L4 pedicles on the right side. I then retracted the dura medially which revealed a large disc herniation.  I used a 15 blade to perform an annulotomy, and used pituitary rongeurs to perform a partial discectomy. At the end of the procedure, there was a capacious canal with no neural impingement.  I then performed copious irrigation and hemostasis. I performed closure of the fascia utilizing Vicryl 0 in a continuous fashion.  I then closed the subcutaneous tissue layer utilizing Vicryl 2-0. Vicryl 3-0 was used for the skin. Marcaine 0.5% with epinephrine was then injected into the skin and subcutaneous tissues. Steri-Strips were applied followed by a sterile dressing. The patient was then placed supine on her bed and subsequently extubated without complications and brought to the recovery room in satisfactory condition.     POSTOPERATIVE PLAN:  The patient is to mobilize as tolerated.  Oxycodone for pain.  After discharge, the patient will be seen back in clinic in 6 weeks.

## 2022-11-09 NOTE — ANESTHESIA POSTPROCEDURE EVALUATION
Patient: Katt Dsouza    Procedure: Procedure(s):  LUMBAR 3 - LUMBAR 4 BILATERAL MEDIAL FACETECTOMY, DECOMPRESSION PARTIAL DISCECTOMY       Anesthesia Type:  General    Note:  Disposition: Admission   Postop Pain Control: Uneventful            Sign Out: Well controlled pain   PONV: No   Neuro/Psych: Uneventful            Sign Out: Acceptable/Baseline neuro status   Airway/Respiratory: Uneventful            Sign Out: Acceptable/Baseline resp. status   CV/Hemodynamics: Uneventful            Sign Out: Acceptable CV status; No obvious hypovolemia; No obvious fluid overload   Other NRE: NONE   DID A NON-ROUTINE EVENT OCCUR? No           Last vitals:  Vitals Value Taken Time   /55 11/08/22 1900   Temp     Pulse 66 11/08/22 1904   Resp 12 11/08/22 1904   SpO2 93 % 11/08/22 1904   Vitals shown include unvalidated device data.    Electronically Signed By: SALVADOR DIAMOND MD  November 8, 2022  7:05 PM

## 2022-11-09 NOTE — CONSULTS
[facility]  Consult Note - Hospitalist Service  Date of Admission:  11/8/2022  Consult Requested by: Orthopedics  Reason for Consult: Postop medical cares, DM2    Assessment & Plan   Katt Dsouza is a 82 year old old female with a history of asthma, HTN, HLD, gout, DM2, HFpEF, GERD, DDD underwent L3-4 spine surgery. [facility] [place] to evaluate patient for postoperative medical management as follows below. Please resume the home medications as reconciled and further noted below with ordered hold parameters.  Vital signs have been stable post-operatively including hemodynamically stable blood pressure and heart rate. Thank you for this consult; we will continue to follow this patient until discharge.    HTN  HFpEF  -Order home cardiac medications with the written tiered hold parameters given risk for post-operative hypotension. Given ACEi/ARB/diuretic medication, ordered creatine, potassium, and magnesium to evaluate renal function and electrolytes, respectively.      DM2  -Hold home oral anti-glycemics post-op given risk for acute hypoglycemia with sulfonylureas and nephrotoxicity if contrast is utilized in any emergent imaging with biguanides; utilize insulin corrections three times a day and at bedtime. Restart home oral anti-glycemics step-wise as tolerated once patient is eating as per their normal, usual diet.  -Carb-controlled/diabetic diet.      HLD  -Order home statin.    GERD  -Order home antacids/medications. Utilize formulary while inpatient.    Gout  -Order home medications.     Asthma  -Order home inhalers and medications.   -Nebulizer and RT pulmonary hygiene as needed.     S/p L3-4 Surgery of Spine  DDD  Acute Post-Op Pain  -Agree with current pain control regimen; consider acute pain team consultation if increasingly difficult to control or proves refractory.   -PT evaluation.   -OT evaluation.  -IS frequently, initially every hour while awake as tolerated. Directly  "encouraged and discussed.      Post-Op DVT Prophylaxis  -As per primary surgery team.      Procedure(s):  LUMBAR 3 - LUMBAR 4 BILATERAL MEDIAL FACETECTOMY, DECOMPRESSION PARTIAL DISCECTOMY  Post-operative Day: Day of Surgery  Code status:No Order     Type of Anesthesia   General    Estimated Blood Loss:  * No values recorded between 11/8/2022  4:43 PM and 11/8/2022  6:06 PM *    Hospital Problem List   No problem-specific Assessment & Plan notes found for this encounter.    Active Problems:    * No active hospital problems. *      -Reviewed the patient's preoperative H and P and updated missing elements.  -Home medication reconciliation has been reviewed.  Medications have been ordered as noted from the home list and changes are documented above        The patient's care was discussed with the Patient and Patient's Family.    James Jerry MD  [facility]  Securely message with the Vocera Web Console (learn more here)  Text page via [employer]       Hospitalist Service    Clinically Significant Risk Factors Present on Admission                  # Hypertension: home medication list includes antihypertensive(s)     # Obesity: Estimated body mass index is 34.5 kg/m  as calculated from the following:    Height as of this encounter: 1.626 m (5' 4\").    Weight as of this encounter: 91.2 kg (201 lb).           ______________________________________________________________________    Chief Complaint   Postop medical cares, HTN, DM2, Asthma     History is obtained from the patient    History of Present Illness   Katt Dsouza is a 82 year old old female with a history of asthma, HTN, HLD, gout, DM2, HFpEF, GERD, DDD underwent L3-4 spine surgery.    She has been in her usual state of health prior to presenting for this elective surgery.  She denies any associated symptoms prior to coming in today, and also denies any recent travel domestically or internationally, and also denies any " recent sick contacts around her including any family or friends who have been sick.  When asked, she denies any fevers, rigors, chest pain or shortness of breath, nausea or vomiting or abdominal pain, changes in bowel movements/pattern, urinary symptoms, focal weakness, or any other new and associated symptoms at this time.  She has been compliant with her medications.  14 point review of systems performed with the patient without any other pertinent positives at this time.    Presently, she states her pain is currently well-tolerated.  She denies any new complaints or symptoms at this time.    Her social history, family history, and past medical history were directly reviewed with her and corroborated to be accurate as documented below. All questions she had at this time were answered to her verbalized and stated satisfaction and understanding.     Review of Systems   The 10 point Review of Systems is negative other than noted in the HPI or here.     Past Medical History    I have reviewed this patient's medical history and updated it with pertinent information if needed.   Past Medical History:   Diagnosis Date     Diabetes (H)      Hypertension      Uncomplicated asthma        Past Surgical History   I have reviewed this patient's surgical history and updated it with pertinent information if needed.  Past Surgical History:   Procedure Laterality Date     BACK SURGERY         Social History   I have reviewed this patient's social history and updated it with pertinent information if needed.  Social History     Tobacco Use     Smoking status: Former     Types: Cigarettes     Quit date: 1995     Years since quittin.8     Smokeless tobacco: Never   Substance Use Topics     Alcohol use: Yes     Comment: rare     Drug use: Never       Family History   Reports father with CHF and mother with DM2; no other family hx.     Medications   I have reviewed this patient's current medications  Medications Prior to  Admission   Medication Sig Dispense Refill Last Dose     albuterol (PROAIR HFA/PROVENTIL HFA/VENTOLIN HFA) 108 (90 Base) MCG/ACT inhaler Inhale 2 puffs into the lungs every 4 hours as needed for shortness of breath / dyspnea or wheezing 8.5 g 1 prn at has with     allopurinoL (ZYLOPRIM) 100 MG tablet [ALLOPURINOL (ZYLOPRIM) 100 MG TABLET] Take 100 mg by mouth daily.   11/7/2022     amLODIPine (NORVASC) 10 MG tablet Take 10 mg by mouth daily   11/7/2022     atorvastatin (LIPITOR) 20 MG tablet [ATORVASTATIN (LIPITOR) 20 MG TABLET] Take 20 mg by mouth at bedtime.   11/7/2022     famotidine (PEPCID) 20 MG tablet Take 20 mg by mouth daily as needed   More than a month     fluticasone (FLONASE) 50 MCG/ACT nasal spray SPRAY 2 SPRAYS IN NOSTRIL 2 TIMES DAILY 16 g 0 11/7/2022 at has with     gabapentin (NEURONTIN) 300 MG capsule Take 600 mg by mouth At Bedtime    11/7/2022     hydrochlorothiazide (HYDRODIURIL) 25 MG tablet Take 50 mg by mouth daily   11/7/2022     losartan (COZAAR) 100 MG tablet Take 100 mg by mouth daily   11/8/2022     metFORMIN (GLUCOPHAGE XR) 500 MG 24 hr tablet Take 500 mg by mouth daily (with breakfast)   11/7/2022     metFORMIN (GLUCOPHAGE XR) 500 MG 24 hr tablet Take 1,000 mg by mouth daily (with dinner)   11/7/2022     senna-docusate (SENOKOT-S/PERICOLACE) 8.6-50 MG tablet Take 1 tablet by mouth 2 times daily as needed   Past Week     traMADol (ULTRAM) 50 MG tablet Take 50 mg by mouth daily as needed for severe pain   Past Week     TRELEGY ELLIPTA 100-62.5-25 MCG/INH oral inhaler Inhale 1 puff into the lungs daily 90 each 11 11/8/2022 at has with     zolpidem (AMBIEN) 10 MG tablet Take 1 tablet (10 mg) by mouth nightly as needed for sleep 30 tablet 0 prn     methocarbamol (ROBAXIN) 500 MG tablet Take 500 mg by mouth 4 times daily as needed for muscle spasms   prn     simethicone (MYLICON) 80 MG chewable tablet Take 80 mg by mouth every 6 hours as needed for flatulence or cramping (Patient not  taking: Reported on 8/12/2022)          Allergies   Allergies   Allergen Reactions     Amoxicillin      Other reaction(s): hives     Animal Dander Unknown     Penicillin G      Penicillin V      Other reaction(s): hives     Penicillins Hives     Sulfa Drugs Hives       Physical Exam   Vital Signs: Temp: 97.3  F (36.3  C) Temp src: Temporal BP: 120/55 Pulse: 67   Resp: 15 SpO2: 92 % O2 Device: Nasal cannula Oxygen Delivery: 3 LPM  Weight: 201 lbs 0 oz    GENERAL:  Alert, appears comfortable, in no acute distress, appears stated age   HEAD:  Normocephalic, without obvious abnormality, atraumatic   EYES:  PERRL, conjunctiva/corneas clear, no scleral icterus, EOM's intact   NOSE: Nares normal, septum midline, mucosa normal, no drainage   THROAT: Lips, mucosa, and tongue normal; teeth and gums normal, mouth moist   NECK: Supple, symmetrical, trachea midline   BACK:   Symmetric, no curvature, ROM normal   LUNGS:   Clear to auscultation bilaterally, no rales, rhonchi, or wheezing, symmetric chest rise on inhalation, respirations unlabored   CHEST WALL:  No tenderness or deformity   HEART:  Regular rate and rhythm, S1 and S2 normal, no murmur, rub, or gallop    ABDOMEN:   Soft, non-tender, bowel sounds active all four quadrants, no masses, no organomegaly, no rebound or guarding   EXTREMITIES: Extremities normal, atraumatic, no cyanosis or edema    SKIN: Dry to touch, no exanthems in the visualized areas   NEURO: Alert, oriented x 4, moves all four extremities freely/spontaneously   PSYCH: Cooperative, behavior is appropriate      Data   Results for orders placed or performed during the hospital encounter of 11/08/22 (from the past 24 hour(s))   Glucose by meter   Result Value Ref Range    GLUCOSE BY METER POCT 120 (H) 70 - 99 mg/dL   Glucose by meter   Result Value Ref Range    GLUCOSE BY METER POCT 160 (H) 70 - 99 mg/dL   XR Surgery DAVID  Fluoro G/T 5 Min    Narrative    This exam was marked as non-reportable because it  will not be read by a   radiologist or a [place] non-radiologist provider.           Most Recent 3 CBC's:Recent Labs   Lab Test 05/19/22  1428 01/28/22  1831 01/13/22  0642   WBC 9.7 9.9 9.4   HGB 13.7 12.1 10.4*   MCV 92 91 95    356 320     Most Recent 3 BMP's:Recent Labs   Lab Test 11/08/22  1823 11/08/22  1326 10/28/22  1414 05/19/22  1428 01/28/22  1846 01/28/22  1831   NA  --   --  143 145  --  138   POTASSIUM  --   --  4.0 3.4*  --  3.2*   CHLORIDE  --   --  104 104  --  104   CO2  --   --  27 30  --  23   BUN  --   --  25.7* 21  --  15   CR  --   --  1.08* 1.12* 0.9 0.89   ANIONGAP  --   --  12 11  --  11   MARIO  --   --  9.7 9.5  --  9.6   * 120* 109* 142*  --  129*     Most Recent 2 LFT's:Recent Labs   Lab Test 05/19/22  1428 05/18/20  0946   AST 25 26   ALT 28 43   ALKPHOS 98 80   BILITOTAL 0.3 0.4

## 2022-11-10 VITALS
DIASTOLIC BLOOD PRESSURE: 65 MMHG | TEMPERATURE: 97.5 F | SYSTOLIC BLOOD PRESSURE: 143 MMHG | RESPIRATION RATE: 18 BRPM | OXYGEN SATURATION: 91 % | BODY MASS INDEX: 34.81 KG/M2 | WEIGHT: 203.9 LBS | HEIGHT: 64 IN | HEART RATE: 63 BPM

## 2022-11-10 LAB
ANION GAP SERPL CALCULATED.3IONS-SCNC: 10 MMOL/L (ref 5–18)
BUN SERPL-MCNC: 26 MG/DL (ref 8–28)
CALCIUM SERPL-MCNC: 8.6 MG/DL (ref 8.5–10.5)
CHLORIDE BLD-SCNC: 102 MMOL/L (ref 98–107)
CO2 SERPL-SCNC: 28 MMOL/L (ref 22–31)
CREAT SERPL-MCNC: 0.99 MG/DL (ref 0.6–1.1)
GFR SERPL CREATININE-BSD FRML MDRD: 57 ML/MIN/1.73M2
GLUCOSE BLD-MCNC: 174 MG/DL (ref 70–125)
GLUCOSE BLDC GLUCOMTR-MCNC: 132 MG/DL (ref 70–99)
GLUCOSE BLDC GLUCOMTR-MCNC: 198 MG/DL (ref 70–99)
POTASSIUM BLD-SCNC: 4.1 MMOL/L (ref 3.5–5)
SODIUM SERPL-SCNC: 140 MMOL/L (ref 136–145)

## 2022-11-10 PROCEDURE — 80048 BASIC METABOLIC PNL TOTAL CA: CPT

## 2022-11-10 PROCEDURE — 82962 GLUCOSE BLOOD TEST: CPT

## 2022-11-10 PROCEDURE — 250N000012 HC RX MED GY IP 250 OP 636 PS 637

## 2022-11-10 PROCEDURE — 250N000013 HC RX MED GY IP 250 OP 250 PS 637: Performed by: PHYSICIAN ASSISTANT

## 2022-11-10 PROCEDURE — 99213 OFFICE O/P EST LOW 20 MIN: CPT

## 2022-11-10 PROCEDURE — 250N000013 HC RX MED GY IP 250 OP 250 PS 637: Performed by: HOSPITALIST

## 2022-11-10 PROCEDURE — 36415 COLL VENOUS BLD VENIPUNCTURE: CPT

## 2022-11-10 PROCEDURE — 99215 OFFICE O/P EST HI 40 MIN: CPT | Performed by: HOSPITALIST

## 2022-11-10 RX ORDER — METHYLPREDNISOLONE 4 MG/1
4 TABLET ORAL ONCE
Qty: 1 TABLET | Refills: 0 | Status: SHIPPED | OUTPATIENT
Start: 2022-11-10 | End: 2022-11-10

## 2022-11-10 RX ORDER — OXYCODONE HYDROCHLORIDE 5 MG/1
5 TABLET ORAL ONCE
Status: COMPLETED | OUTPATIENT
Start: 2022-11-10 | End: 2022-11-10

## 2022-11-10 RX ORDER — OXYCODONE HYDROCHLORIDE 5 MG/1
5-10 TABLET ORAL EVERY 4 HOURS PRN
Status: DISCONTINUED | OUTPATIENT
Start: 2022-11-10 | End: 2022-11-10 | Stop reason: HOSPADM

## 2022-11-10 RX ORDER — LANOLIN ALCOHOL/MO/W.PET/CERES
3 CREAM (GRAM) TOPICAL
Status: DISCONTINUED | OUTPATIENT
Start: 2022-11-10 | End: 2022-11-10 | Stop reason: HOSPADM

## 2022-11-10 RX ADMIN — Medication 3 MG: at 01:18

## 2022-11-10 RX ADMIN — ALLOPURINOL 100 MG: 100 TABLET ORAL at 07:56

## 2022-11-10 RX ADMIN — SENNOSIDES AND DOCUSATE SODIUM 1 TABLET: 50; 8.6 TABLET ORAL at 07:55

## 2022-11-10 RX ADMIN — OXYCODONE HYDROCHLORIDE 10 MG: 5 TABLET ORAL at 05:23

## 2022-11-10 RX ADMIN — POLYETHYLENE GLYCOL 3350 17 G: 17 POWDER, FOR SOLUTION ORAL at 09:06

## 2022-11-10 RX ADMIN — METHOCARBAMOL 500 MG: 500 TABLET, FILM COATED ORAL at 05:46

## 2022-11-10 RX ADMIN — INSULIN ASPART 2 UNITS: 100 INJECTION, SOLUTION INTRAVENOUS; SUBCUTANEOUS at 11:06

## 2022-11-10 RX ADMIN — AMLODIPINE BESYLATE 10 MG: 10 TABLET ORAL at 07:55

## 2022-11-10 RX ADMIN — MAGNESIUM HYDROXIDE 30 ML: 400 SUSPENSION ORAL at 12:34

## 2022-11-10 RX ADMIN — OXYCODONE HYDROCHLORIDE 5 MG: 5 TABLET ORAL at 12:29

## 2022-11-10 RX ADMIN — ACETAMINOPHEN 975 MG: 325 TABLET, FILM COATED ORAL at 12:28

## 2022-11-10 RX ADMIN — METHYLPREDNISOLONE 4 MG: 4 TABLET ORAL at 10:20

## 2022-11-10 RX ADMIN — ACETAMINOPHEN 975 MG: 325 TABLET, FILM COATED ORAL at 04:01

## 2022-11-10 RX ADMIN — DIPHENHYDRAMINE HYDROCHLORIDE 12.5 MG: 25 SOLUTION ORAL at 07:56

## 2022-11-10 RX ADMIN — HYDROCHLOROTHIAZIDE 50 MG: 25 TABLET ORAL at 10:56

## 2022-11-10 RX ADMIN — OXYCODONE HYDROCHLORIDE 5 MG: 5 TABLET ORAL at 01:18

## 2022-11-10 RX ADMIN — Medication 2 SPRAY: at 09:10

## 2022-11-10 ASSESSMENT — ACTIVITIES OF DAILY LIVING (ADL)
ADLS_ACUITY_SCORE: 23

## 2022-11-10 NOTE — PROGRESS NOTES
[facility]    Medicine Progress Note - Hospitalist Service    Date of Admission:  11/8/2022    Assessment & Plan   Katt Dsouza is a 82 year old old female with a history of asthma, HTN, HLD, gout, DM2, HFpEF, GERD, DDD underwent L3-4 spine surgery. [facility] [place] was asked to evaluate patient for postoperative medical management as follows below. Please resume the home medications as reconciled and further noted below with ordered hold parameters.  Vital signs have been stable post-operatively including hemodynamically stable blood pressure and heart rate. Thank you for this consult; we will continue to follow this patient until discharge. AFRICA for which nephrology was consulted on 11/9. AFRICA has now resolved, Cr back to baseline 0.9.  No medical objections to discharge at the discretion of the primary surgery team. Please page and update [facility] with any clinical status changes or questions.       AFRICA, resolved  -Hold potentially nephrogenic or nephrotoxic medications.   -Home HCTZ, losartan, and metformin being held.  -Nephrology consultation appreciated.   -Cr baseline appears ~0.9; Cr is 1.4 on 11/10  -Back to baseline now,  Cr 0.9    HTN  HFpEF  -Order home cardiac medications with the written tiered hold parameters given risk for post-operative hypotension. Given ACEi/ARB/diuretic medication, ordered creatine, potassium, and magnesium to evaluate renal function and electrolytes, respectively.   -AFRICA noted on labs, see above.  -Resume home medications on discharge.     DM2  -Hold home oral anti-glycemics post-op given risk for acute hypoglycemia with sulfonylureas and nephrotoxicity if contrast is utilized in any emergent imaging with biguanides; utilize insulin corrections three times a day and at bedtime. Restart home oral anti-glycemics step-wise as tolerated once patient is eating as per their normal, usual diet.  -Hold metformin with AFRICA.  -Carb-controlled/diabetic diet.   -Resume home  medications on discharge.     HLD  -Order home statin.    GERD  -Order home antacids/medications. Utilize formulary while inpatient.    Gout  -Order home medications.     Asthma  -Order home inhalers and medications.   -Nebulizer and RT pulmonary hygiene as needed.     S/p L3-4 Surgery of Spine  DDD  Acute Post-Op Pain  -Agree with current pain control regimen; consider acute pain team consultation if increasingly difficult to control or proves refractory.   -PT evaluation.   -OT evaluation.  -IS frequently, initially every hour while awake as tolerated. Directly encouraged and discussed.      Post-Op DVT Prophylaxis  -As per primary surgery team.      Procedure(s):  LUMBAR 3 - LUMBAR 4 BILATERAL MEDIAL FACETECTOMY, DECOMPRESSION PARTIAL DISCECTOMY  Post-operative Day: Day of Surgery  Code status:No Order     Type of Anesthesia   General    Estimated Blood Loss:  * No values recorded between 11/8/2022  4:43 PM and 11/8/2022  6:06 PM *    Hospital Problem List   No problem-specific Assessment & Plan notes found for this encounter.    Active Problems:    * No active hospital problems. *      -Reviewed the patient's preoperative H and P and updated missing elements.  -Home medication reconciliation has been reviewed.  Medications have been ordered as noted from the home list and changes are documented above          Diet: Moderate Consistent Carb (60 g CHO per Meal) Diet    DVT Prophylaxis: Defer to primary service  Gil Catheter: Not present  Central Lines: None  Cardiac Monitoring: None  Code Status: Full Code      Disposition Plan      Expected Discharge Date: 11/10/2022        Discharge Comments: pending drain removal  nephrology consult        The patient's care was discussed with the Patient and Patient's Family.    >45 mins with 50% of the floor time (for inpatient hospital services) spent providing counseling or coordination of care (C/CC).  This includes evaluating and managing acute kidney injury,  "stabilizing the patient s glucose levels and, stabilizing the patient's hemodynamics including blood pressure and heart rate, formulating the appropriate care plan for today, and also extensive counseling regarding disease management, lifestyle modifications, and medication regimen with the patient and/or caregivers. Coordination of care for outpatient programs and resources is also crucial and discussed with patient and/or caregivers and completed updated medication reconciliation for safe discharge.      James Jerry MD  Hospitalist Service  [facility]  Securely message with the Vocera Web Console (learn more here)  Text page via [employer] Paging/Directory         Clinically Significant Risk Factors Present on Admission                  # Hypertension: home medication list includes antihypertensive(s)     # Obesity: Estimated body mass index is 35 kg/m  as calculated from the following:    Height as of this encounter: 1.626 m (5' 4\").    Weight as of this encounter: 92.5 kg (203 lb 14.4 oz).           ______________________________________________________________________    Interval History   No acute events overnight.    No report of chest pain, shortness of breath, or other new complaints. I discussed her renal US and labs with her in detail. I discussed her renal functioning returning to her normal and baseline. Discussed plan of care with patient. Answered all questions to patient's verbalized understanding and satisfaction. Medication reconciliation completed for anticipated discharge and discussed with patient as well as counseling regarding lifestyle modifications and behaviors in setting of post-operative recovery in the coming weeks, outpatient rehabilitative follow-up plan pending final PT/OT recommendations, and follow-up in clinic with the primary care provider and with surgery as scheduled. All questions were answered to stated and verbalized satisfaction.       Data reviewed " today: I reviewed all medications, new labs and imaging results over the last 24 hours. I personally reviewed .    Physical Exam   Vital Signs: Temp: 97.5  F (36.4  C) Temp src: Oral BP: 134/62 Pulse: 63   Resp: 18 SpO2: 91 % O2 Device: None (Room air)    Weight: 203 lbs 14.4 oz  GENERAL:  Alert, appears comfortable, in no acute distress, appears stated age, now on room air   HEAD:  Normocephalic, without obvious abnormality, atraumatic   EYES:  Conjunctiva/corneas clear, no scleral icterus, EOM's appears intact grossly   NOSE: Nares normal, septum midline, mucosa normal, no drainage   THROAT: Lips, mucosa, and tongue appear normal   NECK: Symmetrical, trachea appears midline   BACK:   Symmetric, no curvature noted   LUNGS:   Symmetric chest rise on inhalation, respirations unlabored   CHEST WALL:  No apparent deformity   HEART:  No thrills or heaves visualized   ABDOMEN:   No masses or organomegaly apparent; non-scaphoid   EXTREMITIES: Extremities appear normal, atraumatic, no cyanosis   SKIN: No exanthems in the visualized areas   NEURO: Alert and oriented, moves all four extremities freely and spontaneously   PSYCH: Cooperative, behavior is appropriate     In-room with Orthopedics on first visit.     Returned for second visit with PT/OT in-room.     Will defer third visit for more detailed exam unless clinical status changes.     Data   Recent Labs   Lab 11/10/22  0745 11/10/22  0522 11/09/22  2212 11/09/22  1218 11/09/22  1038 11/09/22  0657 11/09/22  0532   WBC  --   --   --   --  14.6*  --   --    HGB  --   --   --   --  12.1  --   --    MCV  --   --   --   --  95  --   --    PLT  --   --   --   --  281  --   --    NA  --  140  --   --   --   --  137   POTASSIUM  --  4.1  --   --   --   --  4.1   CHLORIDE  --  102  --   --   --   --  102   CO2  --  28  --   --   --   --  28   BUN  --  26  --   --   --   --  22   CR  --  0.99  --   --   --   --  1.38*   ANIONGAP  --  10  --   --   --   --  7   MARIO  --  8.6  --    --   --   --  8.7   * 174* 209*   < >  --    < > 233*    < > = values in this interval not displayed.     Recent Results (from the past 24 hour(s))   US Renal Complete Non-Vascular    Narrative    EXAM: US RENAL COMPLETE NON-VASCULAR  LOCATION: [facility]  DATE/TIME: 11/9/2022 2:16 PM    INDICATION: Acute renal insufficiency. Known calculi.  COMPARISON: CT 11/26/2019  TECHNIQUE: Routine Bilateral Renal and Bladder Ultrasound.    FINDINGS:    RIGHT KIDNEY: 10 x 5.1 x 5.4 cm. Cortical medullary differentiation is preserved. No hydronephrosis or visible calculi.     LEFT KIDNEY: 11.2 x 4.7 x 5.3 cm. Normal cortical medullary differentiation. There is a 5.6 cm simple cyst projecting from the upper pole, unchanged. No hydronephrosis.     BLADDER: Collapsed.    No ascites.      Impression    IMPRESSION:  1.  No findings to explain recent renal insufficiency.  2.  Incidental left renal cyst again noted.     Medications       acetaminophen  975 mg Oral Q8H     allopurinol  100 mg Oral Daily     amLODIPine  10 mg Oral Daily     atorvastatin  20 mg Oral At Bedtime     fluticasone  2 spray Nasal BID     gabapentin  600 mg Oral At Bedtime     [Held by provider] hydrochlorothiazide  50 mg Oral Daily     insulin aspart  1-7 Units Subcutaneous TID AC     insulin aspart  1-5 Units Subcutaneous At Bedtime     [Held by provider] losartan  100 mg Oral Daily     methylPREDNISolone  4 mg Oral BID     polyethylene glycol  17 g Oral Daily     senna-docusate  1 tablet Oral BID     sodium chloride (PF)  3 mL Intracatheter Q8H

## 2022-11-10 NOTE — PLAN OF CARE
Goal Outcome Evaluation:           Patient vital signs are at baseline: Yes  Patient able to ambulate as they were prior to admission or with assist devices provided by therapies during their stay:  Yes  Patient MUST void prior to discharge:  Yes  Patient able to tolerate oral intake:  Yes  Pain has adequate pain control using Oral analgesics:  Yes  Does patient have an identified :  Yes  Has goal D/C date and time been discussed with patient:  Yes       Creatinine within standard limits, 0.99.    Dominique Petersen RN

## 2022-11-10 NOTE — PROGRESS NOTES
"Addendum: Drain with 5 ml out. Okay to remove. Okay to discharge.    Pam Simon PA-C on 11/10/2022 at 2:18 PM      [facility] Progress Note    POD #: 2  S/P: Procedure(s):  LUMBAR 3 - LUMBAR 4 BILATERAL MEDIAL FACETECTOMY, DECOMPRESSION PARTIAL DISCECTOMY    SUBJECTIVE:  Feeling better than yesterday. Left LE paresthesias improved from yesterday. Sitting up at bedside. Incision pain that is well controlled.    OBJECTIVE:  /62 (BP Location: Left arm)   Pulse 63   Temp 97.5  F (36.4  C) (Oral)   Resp 18   Ht 1.626 m (5' 4\")   Wt 92.5 kg (203 lb 14.4 oz)   SpO2 91%   BMI 35.00 kg/m      Intake/Output Summary (Last 24 hours) at 11/9/2022 1009  Last data filed at 11/9/2022 0654  Gross per 24 hour   Intake 1720 ml   Output 380 ml   Net 1340 ml     Drains: output 10 out overnight, awaiting next output check 1400    PHYSICAL EXAM:  Incision: Covered. Gauze dressing in place. Appears clean, dry, intact.   Awake, alert, and oriented. Conversational. Non-labored breathing.    LOWER EXTREMITIES  RLE   5/5 hip flexor  5/5 Quad  5/5 Tib Ant  5/5 EHL  5/5 Gastroc Soleus  RLE L4-S1 intact to light touch    LLE   5/5 hip flexor  5/5 Quad  5/5 Tib Ant  5/5 EHL  5/5 Gastroc Soleus  LLE L4-S1 intact to light touch    Bilateral calves non-tender, non-edematous, non erythematous, no warmth.    LABS:  No components found for: HEMOGLOBIN    ASSESSMENT:  S/P Procedure(s):  LUMBAR 3 - LUMBAR 4 BILATERAL MEDIAL FACETECTOMY, DECOMPRESSION PARTIAL DISCECTOMY    PLAN:  1. Internal med following. Appreciate their recs.  2. Continue with pain management. medrol 4mg BID for 4 doses started for LLE pain- has received 3 doses so far.  3. PT/OT- ambulate as tolerated   4. Diet per protocol  5. Drain with 10 out overnight. Per nurse, prior shift output not documented. Will await today's shift to see if <30, then okay to pull.  5. Discharge to home today if drain output <30 ml, cleared by nephrology/hospitalist. Patient's son " will be transporting her.      Pam Simon PA-C on 11/10/2022 at 10:22 AM  [facility]

## 2022-11-10 NOTE — DISCHARGE SUMMARY
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 Recinos on 2022. There were no intraoperative complications and the patient was transferred to the recovery room and later the orthopedic unit in stable condition. Once the patient reached the orthopedic floor our orthopedic pain protocol was implemented along with the following:    Anticoagulation Medications: None  Therapy: PT and OT  Activity: No lifting, bending or twisting x6 weeks.  Bracing: None    Consultations during Admission: Hospitalist service for medical management and nephrology. 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 Baseline cr ~1.1, eGFR~55-65% likely etiology is multifactorial including Hypertensive nephrosclerosis, DMN, and age.  Cr keely to 1.3 with suspected post-operative AFRICA, +/- ARB, +/- chronic NSAID use PTA. Hx of + microalbuminuria Hx. Prior UAs bland. Renal US WNL 11 no hydro or calculi. On POD #2, AFRICA resolved and nephrology signed off. Patient is advised to avoid NSAIDS.     COMPLICATIONS/SIGNIFICANT FINDINGS    None    DISCHARGE INFORMATION   Condition at discharge: Good  Discharge destination: Home  Patient was seen by myself on the date of discharge.    FOLLOW UP CARE   Follow up with orthopedics in 6 weeks or sooner should the need arise. Ortho will continue to manage pain control, post op anticoagulation and incision care.     Follow up with your PCP for management of chronic medical  "problems and to evaluate for post op medical complications including constipation, nausea/vomiting, DVT/PE, anemia, changes in blood pressure, fevers/chills, urinary retention and atelectasis/pneumonia.     Subjective   Patient is doing well on POD #2. Pain is well controlled with oral medications. Ambulating. Tolerating oral intake. Voiding.    Physical Exam   /62 (BP Location: Left arm)   Pulse 63   Temp 97.5  F (36.4  C) (Oral)   Resp 18   Ht 1.626 m (5' 4\")   Wt 92.5 kg (203 lb 14.4 oz)   SpO2 91%   BMI 35.00 kg/m    Incision: Covered. Gauze dressing in place. Appears clean, dry, intact.   Awake, alert, and oriented. Conversational. Non-labored breathing.     LOWER EXTREMITIES  RLE   5/5 hip flexor  5/5 Quad  5/5 Tib Ant  5/5 EHL  5/5 Gastroc Soleus  RLE L4-S1 intact to light touch     LLE   5/5 hip flexor  5/5 Quad  5/5 Tib Ant  5/5 EHL  5/5 Gastroc Soleus  LLE L4-S1 intact to light touch     Bilateral calves non-tender, non-edematous, non erythematous, no warmth.    Pertinent Results at Discharge     Hemoglobin   Date/Time Value Ref Range Status   11/09/2022 10:38 AM 12.1 11.7 - 15.7 g/dL Final   05/19/2022 02:28 PM 13.7 11.7 - 15.7 g/dL Final   01/28/2022 06:31 PM 12.1 11.7 - 15.7 g/dL Final     INR   Date/Time Value Ref Range Status   01/28/2022 06:31 PM 1.01 0.90 - 1.15 Final     Platelet Count   Date/Time Value Ref Range Status   11/09/2022 10:38  150 - 450 10e3/uL Final   05/19/2022 02:28  150 - 450 10e3/uL Final   01/28/2022 06:31  150 - 450 10e3/uL Final       Problem List   Active Problems:    Asthma    Essential hypertension    Articular gout    Gastroesophageal reflux disease without esophagitis    Chronic diastolic (congestive) heart failure (H)      Pam Simon PA-C/Dr. Recinos  [facility]  [phone]  Date: 11/10/2022  Time: 10:43 AM    "

## 2022-11-10 NOTE — PLAN OF CARE
Patient vital signs are at baseline: Yes  Patient able to ambulate as they were prior to admission or with assist devices provided by therapies during their stay:  Yes  Patient MUST void prior to discharge:  Yes  Patient able to tolerate oral intake:  Yes  Pain has adequate pain control using Oral analgesics:  Yes, however pt very anxious and affects her understanding of pain.  Much therapeutic communication done.  Does patient have an identified :  Yes  Has goal D/C date and time been discussed with patient:  Yes  Goal Outcome Evaluation:      Plan of Care Reviewed With: patient    Overall Patient Progress: improvingOverall Patient Progress: improving

## 2022-11-10 NOTE — PLAN OF CARE
Goal Outcome Evaluation:         Patient vital signs are at baseline: Yes  Patient able to ambulate as they were prior to admission or with assist devices provided by therapies during their stay:  Yes  Patient MUST void prior to discharge:  Yes  Patient able to tolerate oral intake:  Yes  Pain has adequate pain control using Oral analgesics:  Yes  Does patient have an identified :  Yes  Has goal D/C date and time been discussed with patient:  Yes     Patient reports Oxycodone was not effective. Paged  MD regarding adding a one time 5 mg Oxycodone and changing PRN Oxycodone from 2.5/5 mg to 5/10 mg. Also paged to add PRN Melatonin per patient request. MD added one time order of 5 mg Oxycodone, updated PRN Oxycodone and PRN Melatonin. Second 5 mg dose was effective.       Dominique Petersen RN

## 2022-11-10 NOTE — CONSULTS
"ACUPUNCTURIST TREATMENT NOTE    Name: Katt Dsouza  :  1940  MRN:  [id number]    Acupuncture Treatment  Patient Type: Orthopedic  Intervention Reason: Pain  Pain Location: low back paiin  Pre-session Pain Ratin  Post-session Pain Ratin  Patient complaint:: low back pain  Initial insertions: (R) (SI 3, BL 62); (L) (TW 5, GB 41, Da Lizbeth); Du 19, St 40, Ki 3, Ki 7  Number of needles inserted: 12  Number of needles removed: 12         \"Risks and benefits of acupuncture were discussed with patient. Consent for treatment was given. We thank you for the referral.\"     Glenny Fish L.Ac.     Date:  11/10/2022  Time:  11:46 AM    "

## 2022-11-10 NOTE — PROGRESS NOTES
RENAL PROGRESS NOTE    CC:  AFRICA    ASSESSMENT & PLAN:     PLAN:   -Avoid nephrotoxins/avoid hypotension/renally dose medications/strict IO/Daily wts  -resume PTA Losartan if needed  -resume hydrochlorothiazide   -With resolved AFRICA renal will sign off, re-consult if need arises.      Non-oliguric AFRICA on CKD 2/3: Baseline cr ~1.1, eGFR~55-65% likely etiology is multifactorial including Hypertensive nephrosclerosis, DMN, and age.  Cr keely to 1.3 with suspected post-operative AFRICA, +/- ARB, +/- chronic NSAID use PTA. Hx of + microalbuminuria Hx. Prior UAs bland. No prior renal imaging in chart, will get imaging today with AFRICA. Renal US WNL 11/9 no hydro or calculi.    HTN: PTA on Amlodipine 10 mg daily , hydrochlorothiazide 50 mg PO daily, and losartan 100 mg Daily. HOLD ARB with AFRICA. Pain management per S.    Anemia: last Hg 13.7. CBC pending.      DDD s/p L3-4 Spinal surgery: post op cares/PT/OT/Pain management per [facility]     HFpEF     DM2: Management per S. HOLD metformin if GFR <30     GERD: pepcid PTA     Gout: on allopurinol     HLD: on statin     Asthma: PTA inhalers, Nebs     Pain: Post-operative chronic pain. Was using NSAIDS daily for back pain PTA. Advised pt to avoid NSAIDS. Only use Tylenol, and prescribed medication. IF needing more aggressive pain medication once discharging I would recommend tramadol as an alternative.     SUBJECTIVE:    Saw at bedside  Discussed renal imaging WNL ho hydro/no stones, renal function at baseline. Resume losartan and hydrochlorothiazide   Denies n, v, constipation, diarrhea, SOB, fever, rash or CP  Pain controlled.   Answered all questions    OBJECTIVE:  Physical Exam   Temp: 97.5  F (36.4  C) Temp src: Oral BP: 134/62 Pulse: 63   Resp: 18 SpO2: 91 % O2 Device: None (Room air)    Vitals:    11/08/22 1302 11/08/22 1320 11/10/22 0541   Weight: 91.2 kg (201 lb) 91.2 kg (201 lb) 92.5 kg (203 lb 14.4 oz)     Vital Signs with Ranges  Temp:  [97.3  F (36.3  C)-97.8  F (36.6   C)] 97.5  F (36.4  C)  Pulse:  [63-70] 63  Resp:  [16-18] 18  BP: (111-149)/(56-64) 134/62  SpO2:  [88 %-96 %] 91 %  I/O last 3 completed shifts:  In: 480 [P.O.:480]  Out: 270 [Urine:200; Drains:70]    Patient Vitals for the past 72 hrs:   Weight   11/10/22 0541 92.5 kg (203 lb 14.4 oz)   11/08/22 1320 91.2 kg (201 lb)   11/08/22 1302 91.2 kg (201 lb)       Intake/Output Summary (Last 24 hours) at 11/10/2022 0921  Last data filed at 11/10/2022 0800  Gross per 24 hour   Intake 600 ml   Output 250 ml   Net 350 ml       PHYSICAL EXAM:  GEN: NAD  CV: RRR, + Murmur  Lung: clear and equal; no extra sounds  Ab: soft and NT; not distended; normal bs, drain present  Ext: no edema and well perfused  Skin: no rash    LABORATORY STUDIES:     Recent Labs   Lab 11/09/22  1038   WBC 14.6*   RBC 3.99   HGB 12.1   HCT 37.9          Basic Metabolic Panel:  Recent Labs   Lab 11/10/22  0745 11/10/22  0522 11/09/22  2212 11/09/22  1742 11/09/22  1218 11/09/22  0657 11/09/22  0532   NA  --  140  --   --   --   --  137   POTASSIUM  --  4.1  --   --   --   --  4.1   CHLORIDE  --  102  --   --   --   --  102   CO2  --  28  --   --   --   --  28   BUN  --  26  --   --   --   --  22   CR  --  0.99  --   --   --   --  1.38*   * 174* 209* 241* 147* 204* 233*   MARIO  --  8.6  --   --   --   --  8.7       INRNo lab results found in last 7 days.     Recent Labs   Lab Test 11/09/22  1038 05/19/22  1428 01/28/22  1831   INR  --   --  1.01   WBC 14.6* 9.7 9.9   HGB 12.1 13.7 12.1    299 356   EXAM: US RENAL COMPLETE NON-VASCULAR  LOCATION: [facility]  DATE/TIME: 11/9/2022 2:16 PM     INDICATION: Acute renal insufficiency. Known calculi.  COMPARISON: CT 11/26/2019  TECHNIQUE: Routine Bilateral Renal and Bladder Ultrasound.     FINDINGS:     RIGHT KIDNEY: 10 x 5.1 x 5.4 cm. Cortical medullary differentiation is preserved. No hydronephrosis or visible calculi.      LEFT KIDNEY: 11.2 x 4.7 x 5.3 cm. Normal  cortical medullary differentiation. There is a 5.6 cm simple cyst projecting from the upper pole, unchanged. No hydronephrosis.      BLADDER: Collapsed.     No ascites.                                                                      IMPRESSION:  1.  No findings to explain recent renal insufficiency.  2.  Incidental left renal cyst again noted.    Personally reviewed current labs    Celestina Quintanilla [facility]  [facility]  [phone]

## 2022-11-17 ENCOUNTER — LAB REQUISITION (OUTPATIENT)
Dept: LAB | Facility: CLINIC | Age: 82
End: 2022-11-17
Payer: MEDICARE

## 2022-11-17 DIAGNOSIS — R10.9 UNSPECIFIED ABDOMINAL PAIN: ICD-10-CM

## 2022-11-17 LAB — PHQ9 SCORE: 1

## 2022-11-17 PROCEDURE — 80053 COMPREHEN METABOLIC PANEL: CPT | Mod: ORL | Performed by: PHYSICIAN ASSISTANT

## 2022-11-18 LAB
ALBUMIN SERPL BCG-MCNC: 3.9 G/DL (ref 3.5–5.2)
ALP SERPL-CCNC: 81 U/L (ref 35–104)
ALT SERPL W P-5'-P-CCNC: 27 U/L (ref 10–35)
ANION GAP SERPL CALCULATED.3IONS-SCNC: 17 MMOL/L (ref 7–15)
AST SERPL W P-5'-P-CCNC: 22 U/L (ref 10–35)
BILIRUB SERPL-MCNC: 0.3 MG/DL
BUN SERPL-MCNC: 18.9 MG/DL (ref 8–23)
CALCIUM SERPL-MCNC: 8.8 MG/DL (ref 8.8–10.2)
CHLORIDE SERPL-SCNC: 93 MMOL/L (ref 98–107)
CREAT SERPL-MCNC: 1.69 MG/DL (ref 0.51–0.95)
DEPRECATED HCO3 PLAS-SCNC: 23 MMOL/L (ref 22–29)
GFR SERPL CREATININE-BSD FRML MDRD: 30 ML/MIN/1.73M2
GLUCOSE SERPL-MCNC: 120 MG/DL (ref 70–99)
POTASSIUM SERPL-SCNC: 3.8 MMOL/L (ref 3.4–5.3)
PROT SERPL-MCNC: 6.1 G/DL (ref 6.4–8.3)
SODIUM SERPL-SCNC: 133 MMOL/L (ref 136–145)

## 2022-11-21 NOTE — PROGRESS NOTES
[facility]      OUTPATIENT PHYSICAL THERAPY EVALUATION  PLAN OF TREATMENT FOR OUTPATIENT REHABILITATION  (COMPLETE FOR INITIAL CLAIMS ONLY)  Patient's Last Name, First Name, M.I.  YOB: 1940  Katt Dsouza                        Provider's Name  [facility] Medical Record No.  [id number]                               Onset Date:  11/08/22   Start of Care Date:  11/09/22      Type:     _X_PT   ___OT   ___SLP Medical Diagnosis:                           PT Diagnosis:  Impaired functional mobility   Visits from SOC:  1   _________________________________________________________________________________  Plan of Treatment/Functional Goals    Planned Interventions: gait training, home exercise program, patient/family education, stair training, transfer training     Goals: See Physical Therapy Goals on Care Plan in [email or web address] electronic health record.    Therapy Frequency: One time eval and treatment only  Predicted Duration of Therapy Intervention: 11/09/22  _________________________________________________________________________________    I CERTIFY THE NEED FOR THESE SERVICES FURNISHED UNDER        THIS PLAN OF TREATMENT AND WHILE UNDER MY CARE     (Physician co-signature of this document indicates review and certification of the therapy plan).              Certification date from: 11/09/22, Certification date to: 11/30/22    Referring Physician: Dr Leno Recinos            Initial Assessment        See Physical Therapy evaluation dated 11/09/22 in Epic electronic health record.

## 2022-12-03 NOTE — PROGRESS NOTES
Katt is a 81 year old who is being evaluated via a billable video visit.      How would you like to obtain your AVS? [employer]  If the video visit is dropped, the invitation should be resent by: Text to cell phone: [phone]  Will anyone else be joining your video visit? No       No

## 2022-12-12 ENCOUNTER — LAB REQUISITION (OUTPATIENT)
Dept: LAB | Facility: CLINIC | Age: 82
End: 2022-12-12
Payer: MEDICARE

## 2022-12-12 DIAGNOSIS — R10.9 UNSPECIFIED ABDOMINAL PAIN: ICD-10-CM

## 2022-12-12 LAB
ANION GAP SERPL CALCULATED.3IONS-SCNC: 16 MMOL/L (ref 7–15)
BUN SERPL-MCNC: 29.5 MG/DL (ref 8–23)
CALCIUM SERPL-MCNC: 9.4 MG/DL (ref 8.8–10.2)
CHLORIDE SERPL-SCNC: 103 MMOL/L (ref 98–107)
CREAT SERPL-MCNC: 1.04 MG/DL (ref 0.51–0.95)
DEPRECATED HCO3 PLAS-SCNC: 22 MMOL/L (ref 22–29)
GFR SERPL CREATININE-BSD FRML MDRD: 54 ML/MIN/1.73M2
GLUCOSE SERPL-MCNC: 122 MG/DL (ref 70–99)
POTASSIUM SERPL-SCNC: 3.9 MMOL/L (ref 3.4–5.3)
SODIUM SERPL-SCNC: 141 MMOL/L (ref 136–145)

## 2022-12-12 PROCEDURE — 80048 BASIC METABOLIC PNL TOTAL CA: CPT | Mod: ORL | Performed by: PHYSICIAN ASSISTANT

## 2022-12-24 NOTE — ANESTHESIA CARE TRANSFER NOTE
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    Anesthesia Type:   General     Note:    Oropharynx: oropharynx clear of all foreign objects  Level of Consciousness: awake  Oxygen Supplementation: face mask    Independent Airway: airway patency satisfactory and stable        Patient transferred to: PACU    Handoff Report: Identifed the Patient, Identified the Reponsible Provider, Reviewed the pertinent medical history, Discussed the surgical course, Reviewed Intra-OP anesthesia mangement and issues during anesthesia, Set expectations for post-procedure period and Allowed opportunity for questions and acknowledgement of understanding      Vitals:  Vitals Value Taken Time   /59 11/08/22 1910   Temp 36.2  C (97.2  F) 11/08/22 1910   Pulse 68 11/08/22 1910   Resp 22 11/08/22 1910   SpO2 95 % 11/08/22 1910       Electronically Signed By: MELECIO RICCI MD  December 24, 2022  6:55 AM

## 2022-12-29 ENCOUNTER — TRANSFERRED RECORDS (OUTPATIENT)
Dept: HEALTH INFORMATION MANAGEMENT | Facility: CLINIC | Age: 82
End: 2022-12-29

## 2023-04-23 ENCOUNTER — HEALTH MAINTENANCE LETTER (OUTPATIENT)
Age: 83
End: 2023-04-23

## 2023-04-27 DIAGNOSIS — J45.40 MODERATE PERSISTENT ASTHMA WITHOUT COMPLICATION: ICD-10-CM

## 2023-05-16 ENCOUNTER — OFFICE VISIT (OUTPATIENT)
Dept: PULMONOLOGY | Facility: CLINIC | Age: 83
End: 2023-05-16
Payer: MEDICARE

## 2023-05-16 VITALS
WEIGHT: 200.2 LBS | SYSTOLIC BLOOD PRESSURE: 146 MMHG | OXYGEN SATURATION: 95 % | HEART RATE: 68 BPM | DIASTOLIC BLOOD PRESSURE: 80 MMHG | BODY MASS INDEX: 34.36 KG/M2

## 2023-05-16 DIAGNOSIS — J30.89 SEASONAL ALLERGIC RHINITIS DUE TO OTHER ALLERGIC TRIGGER: ICD-10-CM

## 2023-05-16 DIAGNOSIS — J45.40 MODERATE PERSISTENT ASTHMA WITHOUT COMPLICATION: Primary | ICD-10-CM

## 2023-05-16 DIAGNOSIS — J31.0 CHRONIC RHINITIS: ICD-10-CM

## 2023-05-16 PROCEDURE — 99214 OFFICE O/P EST MOD 30 MIN: CPT | Performed by: NURSE PRACTITIONER

## 2023-05-16 RX ORDER — PREDNISONE 20 MG/1
40 TABLET ORAL DAILY
Qty: 14 TABLET | Refills: 0 | Status: SHIPPED | OUTPATIENT
Start: 2023-05-16 | End: 2023-05-23

## 2023-05-16 RX ORDER — FLUTICASONE PROPIONATE 50 MCG
SPRAY, SUSPENSION (ML) NASAL
Qty: 16 G | Refills: 11 | Status: SHIPPED | OUTPATIENT
Start: 2023-05-16 | End: 2024-09-24

## 2023-05-16 ASSESSMENT — ASTHMA QUESTIONNAIRES
ACT_TOTALSCORE: 15
QUESTION_2 LAST FOUR WEEKS HOW OFTEN HAVE YOU HAD SHORTNESS OF BREATH: ONCE A DAY
QUESTION_4 LAST FOUR WEEKS HOW OFTEN HAVE YOU USED YOUR RESCUE INHALER OR NEBULIZER MEDICATION (SUCH AS ALBUTEROL): TWO OR THREE TIMES PER WEEK
QUESTION_1 LAST FOUR WEEKS HOW MUCH OF THE TIME DID YOUR ASTHMA KEEP YOU FROM GETTING AS MUCH DONE AT WORK, SCHOOL OR AT HOME: SOME OF THE TIME
QUESTION_3 LAST FOUR WEEKS HOW OFTEN DID YOUR ASTHMA SYMPTOMS (WHEEZING, COUGHING, SHORTNESS OF BREATH, CHEST TIGHTNESS OR PAIN) WAKE YOU UP AT NIGHT OR EARLIER THAN USUAL IN THE MORNING: ONCE OR TWICE
QUESTION_5 LAST FOUR WEEKS HOW WOULD YOU RATE YOUR ASTHMA CONTROL: SOMEWHAT CONTROLLED
ACT_TOTALSCORE: 15

## 2023-05-16 NOTE — PATIENT INSTRUCTIONS
It was a pleasure to see you in clinic today.   Here is what we discussed:    Continue Trelegy one puff daily, rinse/gargle after use.  Continue Albuterol every 6 hours as needed for shortness of breath or wheezing.  Okay to start the prednisone 40mg x7 days, if you have an exacerbation, and please let us know if you start this.  Call us with any change or worsening of your breathing.  Follow-up annually    Batsheva Huerta, CNP  Pulmonary Medicine  [facility]  [phone]

## 2023-05-16 NOTE — PROGRESS NOTES
Pulmonary Clinic Follow-Up        Assessment/Plan:     Katt Dsouza is a 82 year old former smoker with history of asthma, diastolic heart failure, obesity, presenting today for annual follow-up.    Moderate persistent asthma  Seasonal allergies  Allergic rhinitis  Spirometry ratio is borderline reduced with reduced FEV1 which may suggest early obstruction, not consistent with reversibility.  Also with air-trapping and diffusion capacity is mildly reduced  Flow volume loops demonstrate mild scooping of expiratory limb suggestive of obstruction.  No URIs or exacerbations since last visit.  Her allergies are flaring right now with slight increase in SOB, rhinitis, watery eyes.  But symptoms are not limiting her ADLs and she does not appear to be having exacerbation today.    She is wondering if there is alternative to Trelegy that is cheaper.  Plan:  - I sent script to pharmacy for Breztri, to see if this is cheaper alternative.  Two puffs BID, rinse/gargle.  - If the above is not cheaper, continue Trelegy Ellipta (fluticasone/umeclidinium/vilanterol) 100/62.5/25, one puff daily, rinse/gargle after use.    - continue Albuterol inhaler PRN  - she is UTD with COVID vaccines, annual influenza vaccine, and pneumococcal vaccines  - Action plan: prednisone 40mg x7 days.  I gave her script for this today and let her know to contact us if she needs to start.      Follow-up  - annually, sooner if issues arise    Batsheva Huerta, CNP  Pulmonary Medicine  [facility]  [phone]           CC:     Annual asthma follow-up     HPI:     Katt Dsouza is a 82 year old former smoker with history of asthma, diastolic heart failure, obesity, presenting today for annual visit.    Since last visit (8/2022, Dr Sommers), her breathing has been stable.  Lives in [place] during winter -no issues with breathing or allergies down there.  Came back here in April, now having more SOB, sinus congestion, watery eyes -  consistent with her seasonal allergies.  She is able to do all of her ADLs and continue to work as a .  She walked 2 miles yesterday at work.  On Trelegy daily, rare Albuterol use in [place], now using twice daily here in [place].  On flonase BID, which helps symptoms.  Sinutab oral PRN, as recommended by pharmacist.  No URIs or exacerbations since last visit.     Medical history:     Past Medical History:   Diagnosis Date     Diabetes (H)      Hypertension      Uncomplicated asthma      Past Surgical History:   Procedure Laterality Date     BACK SURGERY       LAMINECTOMY LUMBAR ONE LEVEL Bilateral 2022    Procedure: LUMBAR 3 - LUMBAR 4 BILATERAL MEDIAL FACETECTOMY, DECOMPRESSION PARTIAL DISCECTOMY;  Surgeon: Leno Recinos MD;  Location: [facility] Main OR     Social History     Tobacco Use     Smoking status: Former     Types: Cigarettes     Quit date: 1995     Years since quittin.3     Smokeless tobacco: Never   Vaping Use     Vaping status: Never Used   Substance Use Topics     Alcohol use: Yes     Comment: rare     Drug use: Never     Current Outpatient Medications   Medication     acetaminophen (TYLENOL) 325 MG tablet     albuterol (PROAIR HFA/PROVENTIL HFA/VENTOLIN HFA) 108 (90 Base) MCG/ACT inhaler     allopurinoL (ZYLOPRIM) 100 MG tablet     amLODIPine (NORVASC) 10 MG tablet     atorvastatin (LIPITOR) 20 MG tablet     budeson-glycopyrrol-formoterol (BREZTRI AEROSPHERE) 160-9-4.8 MCG/ACT AERO inhaler     famotidine (PEPCID) 20 MG tablet     fluticasone (FLONASE) 50 MCG/ACT nasal spray     Fluticasone-Umeclidin-Vilant (TRELEGY ELLIPTA) 100-62.5-25 MCG/ACT oral inhaler     gabapentin (NEURONTIN) 300 MG capsule     hydrochlorothiazide (HYDRODIURIL) 25 MG tablet     losartan (COZAAR) 100 MG tablet     metFORMIN (GLUCOPHAGE XR) 500 MG 24 hr tablet     methocarbamol (ROBAXIN) 500 MG tablet     predniSONE (DELTASONE) 20 MG tablet     senna-docusate (SENOKOT-S/PERICOLACE) 8.6-50  MG tablet     zolpidem (AMBIEN) 10 MG tablet     No current facility-administered medications for this visit.      Physical Exam:     BP (!) 146/80 (BP Location: Left arm, Patient Position: Chair, Cuff Size: Adult Large)   Pulse 68   Wt 90.8 kg (200 lb 3.2 oz)   SpO2 95%   BMI 34.36 kg/m    Gen: elderly female , appears in NAD  HEENT: clear conjunctivae, moist mucous membranes  CV: RRR, + murmur  Resp: CTAB, no focal crackles or wheezes.  Respirations even and unlabored.  On RA.  No cough.  Skin: no apparent rashes on visible skin  Ext: no cyanosis, clubbing or edema  Neuro: alert and answering questions appropriately     Data:     EXAM: XR CHEST 2 VW  LOCATION: [facility]  DATE/TIME: 1/28/2022   INDICATION: Shortness of breath  COMPARISON: 5/3/2021                                                 IMPRESSION: No change. Heart size upper limits of normal. Lungs hyperinflated suggesting COPD. No focal pneumonia.      PFTs 2019:  Testing met ATS standards with the exception of lung volumes/washout - did not meet repeatability standards.  FEV1/FVC is 68 and is normal per LLN of 64.  FEV1 is 69% predicted and is reduced.  FVC is 78% predicted and reduced.  There was no improvement in spirometry after a single inhaled does of bronchodilator.  TLC is 108% predicted and is normal.  RV is 145% predicted and is increased.  DLCO is 62% predicted and is reduced when it   is corrected for hemoglobin.  Impression:  Full Pulmonary Function Test is abnormal.    Spirometry ratio is borderline reduced (less than 70 but normal per LLN for age) with reduced FEV1 which may suggest early obstruction. It is not consistent with reversibility.  There is no hyperinflation.  There is air-trapping.  Diffusion capacity is mildly reduced  Flow volume loops demonstrate mild scooping of expiratory limb suggestive of obstruction.

## 2023-05-18 LAB — PHQ9 SCORE: 0

## 2023-06-07 ENCOUNTER — TRANSFERRED RECORDS (OUTPATIENT)
Dept: HEALTH INFORMATION MANAGEMENT | Facility: CLINIC | Age: 83
End: 2023-06-07

## 2023-06-08 ENCOUNTER — TELEPHONE (OUTPATIENT)
Dept: PULMONOLOGY | Facility: CLINIC | Age: 83
End: 2023-06-08
Payer: MEDICARE

## 2023-06-08 NOTE — TELEPHONE ENCOUNTER
Prior Authorization Retail Medication Request    Medication/Dose: fluticasone (FLONASE) 50 MCG/ACT nasal spray  ICD code (if different than what is on RX):    Previously Tried and Failed:    Rationale: now having more SOB, sinus congestion, watery eyes - consistent with her seasonal allergies      Insurance Name:  Medicare and [facility] [place]  Insurance ID:  [id number]      Pharmacy Information (if different than what is on RX)  Name:  [facility]  Phone:  [phone]    Cover  MY meds key : [id number]

## 2023-06-08 NOTE — TELEPHONE ENCOUNTER
Please send all additional information such as, questions, approvals, or denial information to: Central Prior Authorization Team Phone: [phone] Fax: [phone] Thank you!  PA Initiation    Medication: FLUTICASONE PROPIONATE 50 MCG/ACT NA SUSP  Insurance Company: [employer] - Phone [phone] Fax [phone]  Pharmacy Filling the Rx: [id number] - [place], [place] - [facility] [address]  Filling Pharmacy Phone: [phone]  Filling Pharmacy Fax:    Start Date: 6/8/2023

## 2023-06-13 ENCOUNTER — HOSPITAL ENCOUNTER (EMERGENCY)
Facility: HOSPITAL | Age: 83
Discharge: HOME | End: 2023-06-13
Attending: STUDENT IN AN ORGANIZED HEALTH CARE EDUCATION/TRAINING PROGRAM | Admitting: STUDENT IN AN ORGANIZED HEALTH CARE EDUCATION/TRAINING PROGRAM
Payer: MEDICARE

## 2023-06-13 VITALS
WEIGHT: 195 LBS | HEART RATE: 73 BPM | DIASTOLIC BLOOD PRESSURE: 67 MMHG | HEIGHT: 64 IN | RESPIRATION RATE: 29 BRPM | SYSTOLIC BLOOD PRESSURE: 157 MMHG | BODY MASS INDEX: 33.29 KG/M2 | TEMPERATURE: 97 F | OXYGEN SATURATION: 96 %

## 2023-06-13 DIAGNOSIS — J04.0 LARYNGITIS: ICD-10-CM

## 2023-06-13 DIAGNOSIS — R05.2 SUBACUTE COUGH: ICD-10-CM

## 2023-06-13 LAB
FLUAV RNA SPEC QL NAA+PROBE: NEGATIVE
FLUBV RNA RESP QL NAA+PROBE: NEGATIVE
GLUCOSE BLDC GLUCOMTR-MCNC: 105 MG/DL (ref 70–99)
HOLD SPECIMEN: NORMAL
RSV RNA SPEC NAA+PROBE: NEGATIVE
SARS-COV-2 RNA RESP QL NAA+PROBE: NEGATIVE

## 2023-06-13 PROCEDURE — 87637 SARSCOV2&INF A&B&RSV AMP PRB: CPT | Performed by: STUDENT IN AN ORGANIZED HEALTH CARE EDUCATION/TRAINING PROGRAM

## 2023-06-13 PROCEDURE — 82962 GLUCOSE BLOOD TEST: CPT | Mod: GZ

## 2023-06-13 PROCEDURE — C9803 HOPD COVID-19 SPEC COLLECT: HCPCS

## 2023-06-13 PROCEDURE — 99283 EMERGENCY DEPT VISIT LOW MDM: CPT

## 2023-06-13 RX ORDER — PREDNISONE 20 MG/1
50 TABLET ORAL DAILY
Qty: 13 TABLET | Refills: 0 | Status: SHIPPED | OUTPATIENT
Start: 2023-06-13 | End: 2023-06-18

## 2023-06-13 ASSESSMENT — ACTIVITIES OF DAILY LIVING (ADL): ADLS_ACUITY_SCORE: 35

## 2023-06-13 NOTE — ED NOTES
The pt reported tot he triage RN that her breathing is getting more labored and she is feeling much more SOB. She reported she felt like she was going to faint. The pt was assisted back into her wheelchair and brought to a room.

## 2023-06-13 NOTE — ED TRIAGE NOTES
"The pt presents for evalaution of SOB for approximately 6 days. She has a hx of asthma and a \"little bit\" of COPD. She was recently on a course of antibiotics for potential laryngitis. Speaking in complete sentences, breathing unlabored.       "

## 2023-06-13 NOTE — ED PROVIDER NOTES
EMERGENCY DEPARTMENT ENCOUNTER       ED Course & Medical Decision Making     1:13 PM I met with the patient, obtained history, performed an initial exam, and discussed options and plan for diagnostics and treatment here in the ED.    1:54 PM We discussed plans for discharge.     Final Impression  82 year old female presents for evaluation of hoarseness of voice, feeling tightness in her throat, somewhat short of breath and coughing.  Patient recently diagnosed with pneumonia on 6/7, has been on doxycycline since that time though still feels short of breath.  On discussion with patient she does sound like she has some laryngitis, some hoarseness of voice.  Patient saturations 96% on room air, lungs clear to auscultation bilaterally, is already completed a recent Z-Jose few weeks ago, followed by doxycycline course 100 mg twice daily x10 days that she is about senior living through currently in addition to a dose of Rocephin, thus do not suspect worsening or recurrent pneumonia as this is fairly appropriate treatment and patient's clinical picture at bedside is very reassuring.  Appears primarily bothered by the cough as well as the hoarseness and raspiness in her voice.  Discussed that I suspect she has some component of laryngitis, good oral fluid hydration, potentially some warm water with honey may help with that to some degree, though would seem reasonable to treat with a course of steroids to help with laryngitis as well as may be some underlying asthma/COPD flare in the setting of pneumonia.  Will be written course of prednisone 50 mg x 5 days, did discuss that this will raise her blood sugars in the short-term.  All questions answered, will be discharged home.    Prior to making a final disposition on this patient the results of patient's tests and other diagnostic studies were discussed with the patient. All questions were answered. Patient expressed understanding of the plan and was amenable to it.    Medical  "Decision Making    History:    Supplemental history from: Patient     External Record(s) reviewed as documented below;  o 6/7/2023 [facility] office note, seen for chest congestion and cough, chest x-ray showed opacity in the right lower lobe concerning for possible pneumonia, white count of 14.5 with left shift, started on oral doxycycline and some IM Rocephin.  Also prescribed guaifenesin with codeine for cough suppressant.    Work Up:    Chart documentation includes differential considered and any EKGs or imaging independently interpreted by provider, where specified.    In additional to work up documented, I considered the following work up: Chest x-ray, however had a chest x-ray 5 days ago, clinically medication for repeat chest x-ray today    DDx considered but not limited to: Pneumonia, laryngitis, COPD exacerbation, asthma exacerbation    Considered administering antibiotics for: Pneumonia, the lung sounds clear, no respiratory distress, is already 6 days into her course of outpatient oral doxycycline    Systemic symptoms of their presenting illness: Shortness of breath      Complicating factors:    Care impacted by chronic illness: asthma, hypertension, obesity, anxiety, CKD stage III    Care affected by social determinants of health: N/A    Disposition considerations: Discharge. I prescribed additional prescription strength medication(s) as charted. I considered admission, but discharged the patient after share decision making conversation.      Medications - No data to display    New Prescriptions    PREDNISONE (DELTASONE) 20 MG TABLET    Take 2.5 tablets (50 mg) by mouth daily for 5 days       Final Impression     1. Laryngitis    2. Subacute cough        Chief Complaint     Chief Complaint   Patient presents with     Breathing Problem     The pt presents for evalaution of SOB for approximately 6 days. She has a hx of asthma and a \"little bit\" of COPD. She was recently on a course of antibiotics for " "potential laryngitis. Speaking in complete sentences, breathing unlabored.     HPI     Katt Dsouza is a 82 year old female who presents for evaluation of breathing problems.     The patient has been having trouble breathing for about 2 weeks. She reports having shortness of breath and coughing. She was diagnosis with meningitis and prescribed zpack which did not help with shortness of breath. She then was prescribe doxycycline for 10 days and cough syrup. Started on the doxycycline on 06/07/23. She notes since taking she has had relief. She has also been taking inhalers at home with relief. Today, she presents with shortness of breath. Currently, shortness of breath has subsided. She denies any other medical concerns.     I, Pamella Her am serving as a scribe to document services personally performed by Dr. Reymundo Calvin MD, based on my observation and the provider's statements to me. I, Dr. Reymundo Calvin MD attest that Pamella Her is acting in a scribe capacity, has observed my performance of the services and has documented them in accordance with my direction.    Physical Exam     BP (!) 157/67   Pulse 73   Temp 97  F (36.1  C) (Temporal)   Resp 29   Ht 1.626 m (5' 4\")   Wt 88.5 kg (195 lb)   SpO2 96%   BMI 33.47 kg/m    Constitutional: Awake, alert, in no acute distress.  Head: Normocephalic, atraumatic.  ENT: Mucous membranes moist.  Mild hoarseness of voice.  Eyes: Conjunctiva normal.  Respiratory: Respirations even, unlabored, in no acute respiratory distress.  Lungs clear to auscultation bilaterally, no coarseness or wheezing.  Cardiovascular: Regular rate and rhythm. Good peripheral perfusion.  GI: Abdomen soft, non-tender.  Musculoskeletal: Moves all 4 extremities equally.  Integument: Warm, dry.  Neurologic: Alert & oriented x 3. Normal speech. Grossly normal motor and sensory function. No focal deficits noted.  Psychiatric: Normal mood    Labs & Imaging       Results for orders placed or " performed during the hospital encounter of 06/13/23   Symptomatic Influenza A/B, RSV, & SARS-CoV2 PCR (COVID-19) Nasopharyngeal    Specimen: Nasopharyngeal; Swab   Result Value Ref Range    Influenza A PCR Negative Negative    Influenza B PCR Negative Negative    RSV PCR Negative Negative    SARS CoV2 PCR Negative Negative   Glucose by meter   Result Value Ref Range    GLUCOSE BY METER POCT 105 (H) 70 - 99 mg/dL        Reymundo Calvin MD  06/13/23 [id number]

## 2023-06-29 ENCOUNTER — LAB REQUISITION (OUTPATIENT)
Dept: LAB | Facility: CLINIC | Age: 83
End: 2023-06-29
Payer: MEDICARE

## 2023-06-29 ENCOUNTER — TRANSFERRED RECORDS (OUTPATIENT)
Dept: HEALTH INFORMATION MANAGEMENT | Facility: CLINIC | Age: 83
End: 2023-06-29

## 2023-06-29 DIAGNOSIS — R05.9 COUGH, UNSPECIFIED: ICD-10-CM

## 2023-06-29 PROCEDURE — 80053 COMPREHEN METABOLIC PANEL: CPT | Mod: ORL | Performed by: PHYSICIAN ASSISTANT

## 2023-06-30 LAB
ALBUMIN SERPL BCG-MCNC: 4.1 G/DL (ref 3.5–5.2)
ALP SERPL-CCNC: 72 U/L (ref 35–104)
ALT SERPL W P-5'-P-CCNC: 21 U/L (ref 0–50)
ANION GAP SERPL CALCULATED.3IONS-SCNC: 13 MMOL/L (ref 7–15)
AST SERPL W P-5'-P-CCNC: 19 U/L (ref 0–45)
BILIRUB SERPL-MCNC: 0.4 MG/DL
BUN SERPL-MCNC: 20.5 MG/DL (ref 8–23)
CALCIUM SERPL-MCNC: 9 MG/DL (ref 8.8–10.2)
CHLORIDE SERPL-SCNC: 107 MMOL/L (ref 98–107)
CREAT SERPL-MCNC: 1.19 MG/DL (ref 0.51–0.95)
DEPRECATED HCO3 PLAS-SCNC: 25 MMOL/L (ref 22–29)
GFR SERPL CREATININE-BSD FRML MDRD: 46 ML/MIN/1.73M2
GLUCOSE SERPL-MCNC: 129 MG/DL (ref 70–99)
POTASSIUM SERPL-SCNC: 4 MMOL/L (ref 3.4–5.3)
PROT SERPL-MCNC: 6.2 G/DL (ref 6.4–8.3)
SODIUM SERPL-SCNC: 145 MMOL/L (ref 136–145)

## 2023-07-17 ENCOUNTER — TRANSFERRED RECORDS (OUTPATIENT)
Dept: HEALTH INFORMATION MANAGEMENT | Facility: CLINIC | Age: 83
End: 2023-07-17
Payer: MEDICARE

## 2023-07-17 LAB
EJECTION FRACTION: NORMAL %
EJECTION FRACTION: NORMAL %

## 2023-09-10 NOTE — PROGRESS NOTES
HEART CARE ENCOUNTER CONSULTATON NOTE      [facility]  [phone]      Assessment/Recommendations   Assessment/Plan:  1.  Chronic dyspnea on exertion which is more related to pulmonary issues and chronic asthma.  Recently she is describing a recent evaluation for sinus infection and we are going to plan a chest x-ray and a proBNP.  She has a follow-up with her primary care provider and will be in touch pending the results of the testing today.  Her oxygen saturation upon arrival was 90-91 but after being seated was in the 96 to 97% range we will await the plans as outlined.  He does not feel significantly dyspneic does have mild dyspnea with exertion.    2.  Aortic stenosis.  Most recent echocardiogram was completed through [facility].  The mean grain across the aortic valve was stated to be 18 mmHg.  Compares to 10 mmHg in April 2021.  Given that the echocardiogram was completed also I am not able to review the images at this time but can request the CD.  The exam would suggest more moderate aortic stenosis and will need going follow-up.    3.  Mild lower extremity edema.  Reportedly did have some intolerance to furosemide previously discussed.  Plan to check a proBNP today.  Possibly dated to the amlodipine.    4.  Hypertension.  Blood pressure mildly elevated upon arrival but improved during my examination and similar to the numbers that she gets at home.    5.  Dyslipidemia on atorvastatin.  Results in October are reviewed with a total cholesterol was 143 and LDL of 80.    Plan 1.  Laboratory studies  2.  Chest x-ray  3.  Additional comments pending the above with follow-up with her primary care provider and follow-up with nurse practitioner in 2 to 3 weeks.       History of Present Illness/Subjective    HPI: Katt Dsouza is a 83 year old female who is seen in follow-up.  We visited May 2021 with symptoms of dyspnea which I thought was perhaps most related to underlying lung issues and  deconditioning.  BNP at that time was within normal limits.  It was findings of mild aortic stenosis.  Notes are reviewed.  Patient was in the ER September 4 with left-sided face discomfort in June for symptoms of shortness of breath and cough.  She was also admitted to the hospital in 2022 for lumbar stenosis.  She didundergo echocardiogram at the outside clinic July 2023 at which time ejection fraction was normal, mild left ventricular hypertrophy, grade 1 diastolic relaxation abnormality, mild aortic stenosis with a mean gradient of 18 mmHg.  Notes from June 2023 are reviewed.  Most recently she tells me that she was seen in urgent care over Labor Day weekend for a sinus infection and was given doxycycline.  Symptoms have been persistent.  She has some sinus discomfort and a cough productive of some yellowish sputum but no fever.  I have also reviewed pulmonary notes from a few months ago where she was reported to have moderate persistent asthma seasonal allergies.  She was given prednisone at that time.  She has a follow-up with her primary care provider this Thursday.  She does endorse some mild shortness of breath and mild lower extremity edema but no clear-cut orthopnea symptoms.    Recent Echocardiogram Results:  Reading Physician Reading Date Result Priority   Kenna Han MD  [phone] 4/14/2021 Routine   Provider, Historical 4/14/2021      Narrative & Impression    Moderate left atrial enlargement    Left ventricle ejection fraction is normal. The calculated left ventricular ejection fraction is 65% without wall motion abnormality.    Left ventricular diastolic dysfunction    Normal right ventricular size and systolic function.    Borderline to mild aortic valve stenosis with mild regurgitation.    Trace mitral and tricuspid insufficiency. No evidence of pulmonary hypertension.    When compared to the previous study dated 7/12/2019, no significant interval change.       Recent Coronary Angiogram  Results:  Exam Date Exam Time Exam Date Exam Time Accession # Performing Department Results    8/1/19  9:39 AM 8/1/19 10:28 AM [id number] [facility]        [id number]       PACS Images     Show images for CTA Angiogram coronary artery     Study Result    Narrative & Impression     The total Agatston calcium score is 0. A calcium score of zero places the individual in the lowest quartile when compared to an age and gender matched control group and implies a low risk of cardiac events in the next 10 years. (less than 1% per   year).    No significant obstructive plaque or stenosis within the visualized portions of the coronary tree.    Mitral annular calcification. Moderate calcification of the ascending and descending thoracic aorta. In the descending thoracic aorta there is soft and hardened plaque.    Please see separate report per radiology for additional findings             Physical Examination  Review of Systems   Vitals: 140/54 initially, 110/70 during my examination, weight 202 pounds, 74 and regular  Wt Readings from Last 3 Encounters:   06/13/23 88.5 kg (195 lb)   05/16/23 90.8 kg (200 lb 3.2 oz)   11/10/22 92.5 kg (203 lb 14.4 oz)       General Appearance:   no distress, normal body habitus   ENT/Mouth: membranes moist, no oral lesions or bleeding gums.      EYES:  no scleral icterus, normal conjunctivae   Neck: no carotid bruits    Chest/Lungs:   lungs are essentially clear to auscultation, no rales or wheezing, equal chest wall expansion    Cardiovascular:   Regular. Normal first and second heart sounds with 3/6 systolic murmur radiating to the carotids.  Rubs, or gallops; the carotid, radial and posterior tibial pulses are intact, Jugular venous pressure not obviously increased, trace edema bilaterally    Abdomen:  no bruits, or tenderness; bowel sounds are present   Extremities: no cyanosis or clubbing   Skin: no xanthelasma, warm.    Neurologic: no tremors      Psychiatric: alert and oriented x3, calm        Please refer above for cardiac ROS details.        Medical History  Surgical History Family History Social History   Past Medical History:   Diagnosis Date     Diabetes (H)      Hypertension      Uncomplicated asthma      Past Surgical History:   Procedure Laterality Date     BACK SURGERY       LAMINECTOMY LUMBAR ONE LEVEL Bilateral 2022    Procedure: LUMBAR 3 - LUMBAR 4 BILATERAL MEDIAL FACETECTOMY, DECOMPRESSION PARTIAL DISCECTOMY;  Surgeon: Leno Recinos MD;  Location: [facility] Main OR     No family history on file.     Social History     Socioeconomic History     Marital status:      Spouse name: Not on file     Number of children: Not on file     Years of education: Not on file     Highest education level: Not on file   Occupational History     Not on file   Tobacco Use     Smoking status: Former     Types: Cigarettes     Quit date: 1995     Years since quittin.7     Smokeless tobacco: Never   Vaping Use     Vaping Use: Never used   Substance and Sexual Activity     Alcohol use: Yes     Comment: rare     Drug use: Never     Sexual activity: Not on file   Other Topics Concern     Not on file   Social History Narrative     Not on file     Social Determinants of Health     Financial Resource Strain: Not on file   Food Insecurity: Not on file   Transportation Needs: Not on file   Physical Activity: Not on file   Stress: Not on file   Social Connections: Not on file   Intimate Partner Violence: Not on file   Housing Stability: Not on file           Medications  Allergies   Current Outpatient Medications   Medication Sig Dispense Refill     acetaminophen (TYLENOL) 325 MG tablet Take 1-2 tablets (325-650 mg) by mouth every 4 hours as needed for mild pain 50 tablet 0     albuterol (PROAIR HFA/PROVENTIL HFA/VENTOLIN HFA) 108 (90 Base) MCG/ACT inhaler Inhale 2 puffs into the lungs every 4 hours as needed for shortness of breath /  dyspnea or wheezing 8.5 g 1     allopurinoL (ZYLOPRIM) 100 MG tablet [ALLOPURINOL (ZYLOPRIM) 100 MG TABLET] Take 100 mg by mouth daily.       amLODIPine (NORVASC) 10 MG tablet Take 10 mg by mouth daily       atorvastatin (LIPITOR) 20 MG tablet [ATORVASTATIN (LIPITOR) 20 MG TABLET] Take 20 mg by mouth at bedtime.       budeson-glycopyrrol-formoterol (BREZTRI AEROSPHERE) 160-9-4.8 MCG/ACT AERO inhaler Inhale 2 puffs into the lungs 2 times daily 5.9 g 4     famotidine (PEPCID) 20 MG tablet Take 20 mg by mouth daily as needed       fluticasone (FLONASE) 50 MCG/ACT nasal spray spray 2 sprays into each nostril 2 (two) times a day. 16 g 11     Fluticasone-Umeclidin-Vilant (TRELEGY ELLIPTA) 100-62.5-25 MCG/ACT oral inhaler Inhale 1 puff into the lungs daily 60 each 6     gabapentin (NEURONTIN) 300 MG capsule Take 600 mg by mouth At Bedtime        hydrochlorothiazide (HYDRODIURIL) 25 MG tablet Take 50 mg by mouth daily As needed       losartan (COZAAR) 100 MG tablet Take 100 mg by mouth daily       metFORMIN (GLUCOPHAGE XR) 500 MG 24 hr tablet Take 500 mg by mouth daily (with breakfast)       methocarbamol (ROBAXIN) 500 MG tablet Take 1 tablet (500 mg) by mouth every 6 hours as needed for muscle spasms (muscle spasm) 40 tablet 0     senna-docusate (SENOKOT-S/PERICOLACE) 8.6-50 MG tablet Take 1 tablet by mouth daily as needed for constipation 30 tablet 0     zolpidem (AMBIEN) 10 MG tablet Take 1 tablet (10 mg) by mouth nightly as needed for sleep 30 tablet 0       Allergies   Allergen Reactions     Amoxicillin Hives     Other reaction(s): hives     Animal Dander Unknown     Penicillin G      Penicillin V Hives     Other reaction(s): hives     Penicillins Hives     Sulfa Antibiotics Hives          Lab Results    Chemistry/lipid CBC Cardiac Enzymes/BNP/TSH/INR   Recent Labs   Lab Test 10/03/22  1127   CHOL 143   HDL 39*   LDL 80   TRIG 122     Recent Labs   Lab Test 10/03/22  1127 03/03/21  1335 10/14/19  1732   LDL 80 51 74      Recent Labs   Lab Test 06/29/23  1336      POTASSIUM 4.0   CHLORIDE 107   CO2 25   *   BUN 20.5   CR 1.19*   GFRESTIMATED 46*   MARIO 9.0     Recent Labs   Lab Test 06/29/23  1336 12/12/22  1143 11/17/22  1212   CR 1.19* 1.04* 1.69*     No results for input(s): A1C in the last 69678 hours.       Recent Labs   Lab Test 11/09/22  1038   WBC 14.6*   HGB 12.1   HCT 37.9   MCV 95        Recent Labs   Lab Test 11/09/22  1038 05/19/22  1428 01/28/22  1831   HGB 12.1 13.7 12.1    Recent Labs   Lab Test 01/28/22  2151 01/28/22  1831   TROPONINI 0.03 0.01     Recent Labs   Lab Test 05/19/22  1428 04/26/21  1455 03/24/21  1219 07/05/19  1037   BNP 78 48  --  40   NTBNP  --   --  113  --      Recent Labs   Lab Test 05/19/22  1428   TSH 2.55     Recent Labs   Lab Test 01/28/22  1831   INR 1.01        Reymundo Francisco MD

## 2023-09-11 ENCOUNTER — HOSPITAL ENCOUNTER (OUTPATIENT)
Dept: RADIOLOGY | Facility: HOSPITAL | Age: 83
Discharge: HOME | End: 2023-09-11
Attending: INTERNAL MEDICINE | Admitting: INTERNAL MEDICINE
Payer: MEDICARE

## 2023-09-11 ENCOUNTER — OFFICE VISIT (OUTPATIENT)
Dept: CARDIOLOGY | Facility: CLINIC | Age: 83
End: 2023-09-11
Payer: MEDICARE

## 2023-09-11 VITALS
HEART RATE: 74 BPM | RESPIRATION RATE: 14 BRPM | OXYGEN SATURATION: 96 % | WEIGHT: 202 LBS | SYSTOLIC BLOOD PRESSURE: 110 MMHG | DIASTOLIC BLOOD PRESSURE: 70 MMHG | BODY MASS INDEX: 34.49 KG/M2 | HEIGHT: 64 IN

## 2023-09-11 DIAGNOSIS — R06.02 SHORTNESS OF BREATH: Primary | ICD-10-CM

## 2023-09-11 DIAGNOSIS — R06.02 SHORTNESS OF BREATH: ICD-10-CM

## 2023-09-11 LAB
ANION GAP SERPL CALCULATED.3IONS-SCNC: 12 MMOL/L (ref 7–15)
BUN SERPL-MCNC: 18 MG/DL (ref 8–23)
CALCIUM SERPL-MCNC: 10 MG/DL (ref 8.8–10.2)
CHLORIDE SERPL-SCNC: 103 MMOL/L (ref 98–107)
CREAT SERPL-MCNC: 1.07 MG/DL (ref 0.51–0.95)
DEPRECATED HCO3 PLAS-SCNC: 26 MMOL/L (ref 22–29)
EGFRCR SERPLBLD CKD-EPI 2021: 52 ML/MIN/1.73M2
ERYTHROCYTE [DISTWIDTH] IN BLOOD BY AUTOMATED COUNT: 13.9 % (ref 10–15)
GLUCOSE SERPL-MCNC: 147 MG/DL (ref 70–99)
HCT VFR BLD AUTO: 40.1 % (ref 35–47)
HGB BLD-MCNC: 13.3 G/DL (ref 11.7–15.7)
MAGNESIUM SERPL-MCNC: 1.9 MG/DL (ref 1.7–2.3)
MCH RBC QN AUTO: 30.5 PG (ref 26.5–33)
MCHC RBC AUTO-ENTMCNC: 33.2 G/DL (ref 31.5–36.5)
MCV RBC AUTO: 92 FL (ref 78–100)
NT-PROBNP SERPL-MCNC: 302 PG/ML (ref 0–1800)
PLATELET # BLD AUTO: 267 10E3/UL (ref 150–450)
POTASSIUM SERPL-SCNC: 3.9 MMOL/L (ref 3.4–5.3)
RBC # BLD AUTO: 4.36 10E6/UL (ref 3.8–5.2)
SODIUM SERPL-SCNC: 141 MMOL/L (ref 136–145)
WBC # BLD AUTO: 7.8 10E3/UL (ref 4–11)

## 2023-09-11 PROCEDURE — 83880 ASSAY OF NATRIURETIC PEPTIDE: CPT | Performed by: INTERNAL MEDICINE

## 2023-09-11 PROCEDURE — 36415 COLL VENOUS BLD VENIPUNCTURE: CPT | Performed by: INTERNAL MEDICINE

## 2023-09-11 PROCEDURE — 83735 ASSAY OF MAGNESIUM: CPT | Performed by: INTERNAL MEDICINE

## 2023-09-11 PROCEDURE — 80048 BASIC METABOLIC PNL TOTAL CA: CPT | Performed by: INTERNAL MEDICINE

## 2023-09-11 PROCEDURE — 71046 X-RAY EXAM CHEST 2 VIEWS: CPT

## 2023-09-11 PROCEDURE — 99214 OFFICE O/P EST MOD 30 MIN: CPT | Performed by: INTERNAL MEDICINE

## 2023-09-11 PROCEDURE — 85027 COMPLETE CBC AUTOMATED: CPT | Performed by: INTERNAL MEDICINE

## 2023-09-11 NOTE — LETTER
9/11/2023    TONY Lara  [address] [place] [address]    RE: Katt Dsouza       Dear Colleague,     I had the pleasure of seeing Katt BURNETT Dsouza in the [facility].    HEART CARE ENCOUNTER CONSULTATON NOTE      [facility]  [phone]      Assessment/Recommendations   Assessment/Plan:  1.  Chronic dyspnea on exertion which is more related to pulmonary issues and chronic asthma.  Recently she is describing a recent evaluation for sinus infection and we are going to plan a chest x-ray and a proBNP.  She has a follow-up with her primary care provider and will be in touch pending the results of the testing today.  Her oxygen saturation upon arrival was 90-91 but after being seated was in the 96 to 97% range we will await the plans as outlined.  He does not feel significantly dyspneic does have mild dyspnea with exertion.    2.  Aortic stenosis.  Most recent echocardiogram was completed through [employer].  The mean grain across the aortic valve was stated to be 18 mmHg.  Compares to 10 mmHg in April 2021.  Given that the echocardiogram was completed also I am not able to review the images at this time but can request the CD.  The exam would suggest more moderate aortic stenosis and will need going follow-up.    3.  Mild lower extremity edema.  Reportedly did have some intolerance to furosemide previously discussed.  Plan to check a proBNP today.  Possibly dated to the amlodipine.    4.  Hypertension.  Blood pressure mildly elevated upon arrival but improved during my examination and similar to the numbers that she gets at home.    5.  Dyslipidemia on atorvastatin.  Results in October are reviewed with a total cholesterol was 143 and LDL of 80.    Plan 1.  Laboratory studies  2.  Chest x-ray  3.  Additional comments pending the above with follow-up with her primary care provider and follow-up with nurse practitioner in 2 to 3 weeks.       History of Present Illness/Subjective     HPI: Katt Dsouza is a 83 year old female who is seen in follow-up.  We visited May 2021 with symptoms of dyspnea which I thought was perhaps most related to underlying lung issues and deconditioning.  BNP at that time was within normal limits.  It was findings of mild aortic stenosis.  Notes are reviewed.  Patient was in the ER September 4 with left-sided face discomfort in June for symptoms of shortness of breath and cough.  She was also admitted to the hospital in 2022 for lumbar stenosis.  She didundergo echocardiogram at the outside clinic July 2023 at which time ejection fraction was normal, mild left ventricular hypertrophy, grade 1 diastolic relaxation abnormality, mild aortic stenosis with a mean gradient of 18 mmHg.  Notes from June 2023 are reviewed.  Most recently she tells me that she was seen in urgent care over Labor Day weekend for a sinus infection and was given doxycycline.  Symptoms have been persistent.  She has some sinus discomfort and a cough productive of some yellowish sputum but no fever.  I have also reviewed pulmonary notes from a few months ago where she was reported to have moderate persistent asthma seasonal allergies.  She was given prednisone at that time.  She has a follow-up with her primary care provider this Thursday.  She does endorse some mild shortness of breath and mild lower extremity edema but no clear-cut orthopnea symptoms.    Recent Echocardiogram Results:  Reading Physician Reading Date Result Priority   Kenna Han MD  [phone] 4/14/2021 Routine   Provider, Historical 4/14/2021      Narrative & Impression    Moderate left atrial enlargement    Left ventricle ejection fraction is normal. The calculated left ventricular ejection fraction is 65% without wall motion abnormality.    Left ventricular diastolic dysfunction    Normal right ventricular size and systolic function.    Borderline to mild aortic valve stenosis with mild regurgitation.    Trace mitral and  tricuspid insufficiency. No evidence of pulmonary hypertension.    When compared to the previous study dated 7/12/2019, no significant interval change.       Recent Coronary Angiogram Results:  Exam Date Exam Time Exam Date Exam Time Accession # Performing Department Results    8/1/19  9:39 AM 8/1/19 10:28 AM [id number] [facility]        [id number]       PACS Images     Show images for CTA Angiogram coronary artery     Study Result    Narrative & Impression     The total Agatston calcium score is 0. A calcium score of zero places the individual in the lowest quartile when compared to an age and gender matched control group and implies a low risk of cardiac events in the next 10 years. (less than 1% per   year).    No significant obstructive plaque or stenosis within the visualized portions of the coronary tree.    Mitral annular calcification. Moderate calcification of the ascending and descending thoracic aorta. In the descending thoracic aorta there is soft and hardened plaque.    Please see separate report per radiology for additional findings             Physical Examination  Review of Systems   Vitals: 140/54 initially, 110/70 during my examination, weight 202 pounds, 74 and regular  Wt Readings from Last 3 Encounters:   06/13/23 88.5 kg (195 lb)   05/16/23 90.8 kg (200 lb 3.2 oz)   11/10/22 92.5 kg (203 lb 14.4 oz)       General Appearance:   no distress, normal body habitus   ENT/Mouth: membranes moist, no oral lesions or bleeding gums.      EYES:  no scleral icterus, normal conjunctivae   Neck: no carotid bruits    Chest/Lungs:   lungs are essentially clear to auscultation, no rales or wheezing, equal chest wall expansion    Cardiovascular:   Regular. Normal first and second heart sounds with 3/6 systolic murmur radiating to the carotids.  Rubs, or gallops; the carotid, radial and posterior tibial pulses are intact, Jugular venous pressure not obviously increased,  trace edema bilaterally    Abdomen:  no bruits, or tenderness; bowel sounds are present   Extremities: no cyanosis or clubbing   Skin: no xanthelasma, warm.    Neurologic: no tremors     Psychiatric: alert and oriented x3, calm        Please refer above for cardiac ROS details.        Medical History  Surgical History Family History Social History   Past Medical History:   Diagnosis Date    Diabetes (H)     Hypertension     Uncomplicated asthma      Past Surgical History:   Procedure Laterality Date    BACK SURGERY      LAMINECTOMY LUMBAR ONE LEVEL Bilateral 2022    Procedure: LUMBAR 3 - LUMBAR 4 BILATERAL MEDIAL FACETECTOMY, DECOMPRESSION PARTIAL DISCECTOMY;  Surgeon: Leno Recinos MD;  Location: [facility] Main OR     No family history on file.     Social History     Socioeconomic History    Marital status:      Spouse name: Not on file    Number of children: Not on file    Years of education: Not on file    Highest education level: Not on file   Occupational History    Not on file   Tobacco Use    Smoking status: Former     Types: Cigarettes     Quit date: 1995     Years since quittin.7    Smokeless tobacco: Never   Vaping Use    Vaping Use: Never used   Substance and Sexual Activity    Alcohol use: Yes     Comment: rare    Drug use: Never    Sexual activity: Not on file   Other Topics Concern    Not on file   Social History Narrative    Not on file     Social Determinants of Health     Financial Resource Strain: Not on file   Food Insecurity: Not on file   Transportation Needs: Not on file   Physical Activity: Not on file   Stress: Not on file   Social Connections: Not on file   Intimate Partner Violence: Not on file   Housing Stability: Not on file           Medications  Allergies   Current Outpatient Medications   Medication Sig Dispense Refill    acetaminophen (TYLENOL) 325 MG tablet Take 1-2 tablets (325-650 mg) by mouth every 4 hours as needed for mild pain 50 tablet 0     albuterol (PROAIR HFA/PROVENTIL HFA/VENTOLIN HFA) 108 (90 Base) MCG/ACT inhaler Inhale 2 puffs into the lungs every 4 hours as needed for shortness of breath / dyspnea or wheezing 8.5 g 1    allopurinoL (ZYLOPRIM) 100 MG tablet [ALLOPURINOL (ZYLOPRIM) 100 MG TABLET] Take 100 mg by mouth daily.      amLODIPine (NORVASC) 10 MG tablet Take 10 mg by mouth daily      atorvastatin (LIPITOR) 20 MG tablet [ATORVASTATIN (LIPITOR) 20 MG TABLET] Take 20 mg by mouth at bedtime.      budeson-glycopyrrol-formoterol (BREZTRI AEROSPHERE) 160-9-4.8 MCG/ACT AERO inhaler Inhale 2 puffs into the lungs 2 times daily 5.9 g 4    famotidine (PEPCID) 20 MG tablet Take 20 mg by mouth daily as needed      fluticasone (FLONASE) 50 MCG/ACT nasal spray spray 2 sprays into each nostril 2 (two) times a day. 16 g 11    Fluticasone-Umeclidin-Vilant (TRELEGY ELLIPTA) 100-62.5-25 MCG/ACT oral inhaler Inhale 1 puff into the lungs daily 60 each 6    gabapentin (NEURONTIN) 300 MG capsule Take 600 mg by mouth At Bedtime       hydrochlorothiazide (HYDRODIURIL) 25 MG tablet Take 50 mg by mouth daily As needed      losartan (COZAAR) 100 MG tablet Take 100 mg by mouth daily      metFORMIN (GLUCOPHAGE XR) 500 MG 24 hr tablet Take 500 mg by mouth daily (with breakfast)      methocarbamol (ROBAXIN) 500 MG tablet Take 1 tablet (500 mg) by mouth every 6 hours as needed for muscle spasms (muscle spasm) 40 tablet 0    senna-docusate (SENOKOT-S/PERICOLACE) 8.6-50 MG tablet Take 1 tablet by mouth daily as needed for constipation 30 tablet 0    zolpidem (AMBIEN) 10 MG tablet Take 1 tablet (10 mg) by mouth nightly as needed for sleep 30 tablet 0       Allergies   Allergen Reactions    Amoxicillin Hives     Other reaction(s): hives    Animal Dander Unknown    Penicillin G     Penicillin V Hives     Other reaction(s): hives    Penicillins Hives    Sulfa Antibiotics Hives          Lab Results    Chemistry/lipid CBC Cardiac Enzymes/BNP/TSH/INR   Recent Labs   Lab Test  10/03/22  1127   CHOL 143   HDL 39*   LDL 80   TRIG 122     Recent Labs   Lab Test 10/03/22  1127 03/03/21  1335 10/14/19  1732   LDL 80 51 74     Recent Labs   Lab Test 06/29/23  1336      POTASSIUM 4.0   CHLORIDE 107   CO2 25   *   BUN 20.5   CR 1.19*   GFRESTIMATED 46*   MARIO 9.0     Recent Labs   Lab Test 06/29/23  1336 12/12/22  1143 11/17/22  1212   CR 1.19* 1.04* 1.69*     No results for input(s): A1C in the last 87933 hours.       Recent Labs   Lab Test 11/09/22  1038   WBC 14.6*   HGB 12.1   HCT 37.9   MCV 95        Recent Labs   Lab Test 11/09/22  1038 05/19/22  1428 01/28/22  1831   HGB 12.1 13.7 12.1    Recent Labs   Lab Test 01/28/22  2151 01/28/22  1831   TROPONINI 0.03 0.01     Recent Labs   Lab Test 05/19/22  1428 04/26/21  1455 03/24/21  1219 07/05/19  1037   BNP 78 48  --  40   NTBNP  --   --  113  --      Recent Labs   Lab Test 05/19/22  1428   TSH 2.55     Recent Labs   Lab Test 01/28/22  1831   INR 1.01        Reymundo Francisco MD      Thank you for allowing me to participate in the care of your patient.      Sincerely,     Reymundo Francisco MD     [facility]  cc:   No referring provider defined for this encounter.

## 2023-09-11 NOTE — RESULT ENCOUNTER NOTE
Chest x-ray does not show any evidence for infiltrate or heart failure.  I am not certain she utilizes MyChart.  Awaiting blood test results. JULIO Francisco

## 2023-09-11 NOTE — PATIENT INSTRUCTIONS
We are going to plan blood work and an xray.I will be in touch with results and we can discuss with your primary as well.My nurse is Farhana and her number is [phone]

## 2023-11-07 ENCOUNTER — OFFICE VISIT (OUTPATIENT)
Dept: PULMONOLOGY | Facility: CLINIC | Age: 83
End: 2023-11-07
Payer: MEDICARE

## 2023-11-07 VITALS
WEIGHT: 190 LBS | SYSTOLIC BLOOD PRESSURE: 157 MMHG | OXYGEN SATURATION: 95 % | DIASTOLIC BLOOD PRESSURE: 77 MMHG | HEART RATE: 65 BPM | BODY MASS INDEX: 32.61 KG/M2

## 2023-11-07 DIAGNOSIS — J45.41 MODERATE PERSISTENT ASTHMA WITH EXACERBATION: ICD-10-CM

## 2023-11-07 DIAGNOSIS — J45.40 MODERATE PERSISTENT ASTHMA WITHOUT COMPLICATION: Primary | ICD-10-CM

## 2023-11-07 PROCEDURE — 99214 OFFICE O/P EST MOD 30 MIN: CPT | Performed by: NURSE PRACTITIONER

## 2023-11-07 RX ORDER — ALBUTEROL SULFATE 90 UG/1
2 AEROSOL, METERED RESPIRATORY (INHALATION) EVERY 4 HOURS PRN
Qty: 8.5 G | Refills: 11 | Status: SHIPPED | OUTPATIENT
Start: 2023-11-07

## 2023-11-07 RX ORDER — PREDNISONE 20 MG/1
20 TABLET ORAL DAILY
Qty: 7 TABLET | Refills: 0 | Status: SHIPPED | OUTPATIENT
Start: 2023-11-07 | End: 2023-11-14

## 2023-11-07 RX ORDER — BUDESONIDE AND FORMOTEROL FUMARATE DIHYDRATE 160; 4.5 UG/1; UG/1
2 AEROSOL RESPIRATORY (INHALATION) 2 TIMES DAILY
Qty: 10.2 G | Refills: 11 | Status: SHIPPED | OUTPATIENT
Start: 2023-11-07 | End: 2024-05-01 | Stop reason: ALTCHOICE

## 2023-11-07 ASSESSMENT — ASTHMA QUESTIONNAIRES: ACT_TOTALSCORE: 20

## 2023-11-07 NOTE — PATIENT INSTRUCTIONS
It was a pleasure to see you in clinic today.   Here is what we discussed:    Switch from Trelegy to Symbicort, two puffs twice daily, rinse/gargle after use.  Continue Albuterol every 4-6 hours as needed for shortness of breath or wheezing.  Call my nurse, Brenden ([phone]) with any change or worsening of your breathing.  Follow-up annually.    Batsheva Huerta, CNP  Pulmonary Medicine  [facility]  [phone]

## 2023-11-07 NOTE — PROGRESS NOTES
Pulmonary Clinic Follow-Up        Assessment/Plan:     Katt Dsouza is a 82 year old former smoker with history of asthma, diastolic heart failure, obesity, presenting today for asthma follow-up.    Moderate persistent asthma  Seasonal allergies  Allergic rhinitis  Spirometry ratio is borderline reduced with reduced FEV1 which may suggest early obstruction, not consistent with reversibility.  Also with air-trapping and diffusion capacity is mildly reduced  Flow volume loops demonstrate mild scooping of expiratory limb suggestive of obstruction.  No URIs or exacerbations since last visit.  She is getting ready to spend 6 months in [place] and is looking for a cheaper regimen.  Plan:  - stop Trelegy, start Symbicort two puffs twice daily, rinse/gargle after use.  If respiratory symptoms worsen or if this is not cheaper, she will stick with Trelegy.  - continue Albuterol inhaler PRN  - she is UTD with COVID vaccines, annual influenza vaccine, and pneumococcal vaccines  - Action plan: prednisone 40mg x7 days.  I gave her script for this today and let her know to contact us if she needs to start.      Follow-up  - annually    Batsheva Huerta CNP  Pulmonary Medicine  [facility]  [phone]           CC:     Asthma follow-up     HPI:     Katt Dsouza is a 82 year old former smoker with history of asthma, diastolic heart failure, obesity, presenting today for asthma follow-up.    Since last visit, she was see in  for sinusitis, in September.  Treated with doxycycline.  Going to [place] for the winter x6 months.  Trelegy continues to be quite expensive, she is looking for cheaper alternative.     Medical history:     Past Medical History:   Diagnosis Date    Diabetes (H)     Hypertension     Uncomplicated asthma      Past Surgical History:   Procedure Laterality Date    BACK SURGERY      LAMINECTOMY LUMBAR ONE LEVEL Bilateral 11/8/2022    Procedure: LUMBAR 3 - LUMBAR 4 BILATERAL MEDIAL  FACETECTOMY, DECOMPRESSION PARTIAL DISCECTOMY;  Surgeon: Leno Recinos MD;  Location: [facility] Main OR     Social History     Tobacco Use    Smoking status: Former     Types: Cigarettes     Quit date: 1995     Years since quittin.8    Smokeless tobacco: Never   Vaping Use    Vaping Use: Never used   Substance Use Topics    Alcohol use: Yes     Comment: rare    Drug use: Never     Current Outpatient Medications   Medication    acetaminophen (TYLENOL) 325 MG tablet    albuterol (PROAIR HFA/PROVENTIL HFA/VENTOLIN HFA) 108 (90 Base) MCG/ACT inhaler    allopurinoL (ZYLOPRIM) 100 MG tablet    amLODIPine (NORVASC) 10 MG tablet    atorvastatin (LIPITOR) 20 MG tablet    budesonide-formoterol (SYMBICORT) 160-4.5 MCG/ACT Inhaler    fluticasone (FLONASE) 50 MCG/ACT nasal spray    Fluticasone-Umeclidin-Vilant (TRELEGY ELLIPTA) 100-62.5-25 MCG/ACT oral inhaler    gabapentin (NEURONTIN) 300 MG capsule    hydrochlorothiazide (HYDRODIURIL) 25 MG tablet    losartan (COZAAR) 100 MG tablet    metFORMIN (GLUCOPHAGE XR) 500 MG 24 hr tablet    methocarbamol (ROBAXIN) 500 MG tablet    predniSONE (DELTASONE) 20 MG tablet    zolpidem (AMBIEN) 10 MG tablet    famotidine (PEPCID) 20 MG tablet     No current facility-administered medications for this visit.      Physical Exam:     BP (!) 157/77 (BP Location: Left arm, Patient Position: Sitting, Cuff Size: Adult Regular)   Pulse 65   Wt 86.2 kg (190 lb)   SpO2 95%   BMI 32.61 kg/m    Gen: elderly female , appears in NAD  HEENT: clear conjunctivae, moist mucous membranes  CV: RRR, + murmur  Resp: CTAB, no focal crackles or wheezes.  Respirations even and unlabored.  On RA.  No cough.  Skin: no apparent rashes on visible skin  Ext: no cyanosis, clubbing or edema  Neuro: alert and answering questions appropriately     Data:     EXAM: XR CHEST 2 VW  LOCATION: [facility]  DATE/TIME: 2022   INDICATION: Shortness of breath  COMPARISON:  5/3/2021                                                 IMPRESSION: No change. Heart size upper limits of normal. Lungs hyperinflated suggesting COPD. No focal pneumonia.      PFTs 2019:  Testing met ATS standards with the exception of lung volumes/washout - did not meet repeatability standards.  FEV1/FVC is 68 and is normal per LLN of 64.  FEV1 is 69% predicted and is reduced.  FVC is 78% predicted and reduced.  There was no improvement in spirometry after a single inhaled does of bronchodilator.  TLC is 108% predicted and is normal.  RV is 145% predicted and is increased.  DLCO is 62% predicted and is reduced when it   is corrected for hemoglobin.  Impression:  Full Pulmonary Function Test is abnormal.    Spirometry ratio is borderline reduced (less than 70 but normal per LLN for age) with reduced FEV1 which may suggest early obstruction. It is not consistent with reversibility.  There is no hyperinflation.  There is air-trapping.  Diffusion capacity is mildly reduced  Flow volume loops demonstrate mild scooping of expiratory limb suggestive of obstruction.

## 2023-11-08 ENCOUNTER — TRANSFERRED RECORDS (OUTPATIENT)
Dept: HEALTH INFORMATION MANAGEMENT | Facility: CLINIC | Age: 83
End: 2023-11-08

## 2023-11-08 ENCOUNTER — LAB REQUISITION (OUTPATIENT)
Dept: LAB | Facility: CLINIC | Age: 83
End: 2023-11-08
Payer: MEDICARE

## 2023-11-08 DIAGNOSIS — E78.2 MIXED HYPERLIPIDEMIA: ICD-10-CM

## 2023-11-08 DIAGNOSIS — E11.9 TYPE 2 DIABETES MELLITUS WITHOUT COMPLICATIONS (H): ICD-10-CM

## 2023-11-08 DIAGNOSIS — I13.0 HYPERTENSIVE HEART AND CHRONIC KIDNEY DISEASE WITH HEART FAILURE AND STAGE 1 THROUGH STAGE 4 CHRONIC KIDNEY DISEASE, OR UNSPECIFIED CHRONIC KIDNEY DISEASE (H): ICD-10-CM

## 2023-11-08 LAB
HBA1C MFR BLD: 6.5 %
PHQ9 SCORE: 0

## 2023-11-08 PROCEDURE — 80053 COMPREHEN METABOLIC PANEL: CPT | Mod: ORL | Performed by: PHYSICIAN ASSISTANT

## 2023-11-08 PROCEDURE — 80061 LIPID PANEL: CPT | Mod: ORL | Performed by: PHYSICIAN ASSISTANT

## 2023-11-08 PROCEDURE — 83036 HEMOGLOBIN GLYCOSYLATED A1C: CPT | Mod: ORL | Performed by: PHYSICIAN ASSISTANT

## 2023-11-09 LAB
ALBUMIN SERPL BCG-MCNC: 3.9 G/DL (ref 3.5–5.2)
ALP SERPL-CCNC: 77 U/L (ref 35–104)
ALT SERPL W P-5'-P-CCNC: 15 U/L (ref 0–50)
ANION GAP SERPL CALCULATED.3IONS-SCNC: 12 MMOL/L (ref 7–15)
AST SERPL W P-5'-P-CCNC: 17 U/L (ref 0–45)
BILIRUB SERPL-MCNC: 0.3 MG/DL
BUN SERPL-MCNC: 32.8 MG/DL (ref 8–23)
CALCIUM SERPL-MCNC: 9.2 MG/DL (ref 8.8–10.2)
CHLORIDE SERPL-SCNC: 109 MMOL/L (ref 98–107)
CHOLEST SERPL-MCNC: 158 MG/DL
CREAT SERPL-MCNC: 1.22 MG/DL (ref 0.51–0.95)
DEPRECATED HCO3 PLAS-SCNC: 23 MMOL/L (ref 22–29)
EGFRCR SERPLBLD CKD-EPI 2021: 44 ML/MIN/1.73M2
GLUCOSE SERPL-MCNC: 127 MG/DL (ref 70–99)
HDLC SERPL-MCNC: 53 MG/DL
LDLC SERPL CALC-MCNC: 85 MG/DL
NONHDLC SERPL-MCNC: 105 MG/DL
POTASSIUM SERPL-SCNC: 4.3 MMOL/L (ref 3.4–5.3)
PROT SERPL-MCNC: 6.4 G/DL (ref 6.4–8.3)
SODIUM SERPL-SCNC: 144 MMOL/L (ref 135–145)
TRIGL SERPL-MCNC: 101 MG/DL

## 2023-11-27 ENCOUNTER — TRANSFERRED RECORDS (OUTPATIENT)
Dept: HEALTH INFORMATION MANAGEMENT | Facility: CLINIC | Age: 83
End: 2023-11-27

## 2023-12-11 ENCOUNTER — TELEPHONE (OUTPATIENT)
Dept: CARDIOLOGY | Facility: CLINIC | Age: 83
End: 2023-12-11
Payer: MEDICARE

## 2023-12-11 NOTE — TELEPHONE ENCOUNTER
Pt c/o worsening leg swelling.  Pt does not have a scale or weigh herself daily, but will get one.  Asked about sodium in her diet - pt said she doesn't eat salt, but does eat a lot of processed and canned foods.  Suggested pt cut back on sodium in her diet, elevate her feet throughout the day, start weighing herself daily, make sure she is drinking enough water, get some exercise, and contact PCP as she is the prescribing provider.    Pt said all good advice.  She is currently in [place] for the winter.

## 2023-12-11 NOTE — TELEPHONE ENCOUNTER
[facility] Call Center    Phone Message    May a detailed message be left on voicemail: yes     Reason for Call: Other: Pt is in [place] for the last week and she has swollen legs and ankles.  She take 20 mg of Furosimide each day w/no relief.  How much can she take and what else can she do to reduce the swelling?  Please call to advise     Action Taken: Message routed to:  [facility] cardio    Travel Screening: Not Applicable    Thank you!  [facility]

## 2023-12-12 NOTE — TELEPHONE ENCOUNTER
Recommendations discussed with pt.  Pt does not have PCP in [place], she said she usually just calls her PCP from [place].  Suggested pt establish care in [place] if she is going to be wintering there every year.  Pt verbalized understanding and had no questions.  -ral    ----- Message -----  From: Reymundo Francisco MD  Sent: 12/11/2023   5:40 PM CST  To: Farhana Bob RN    This could also be made worse by her amlodipine.  I would favor following the recommendations that you may just persist that she should see her primary doctor in [place] and discuss whether amlodipine should be decreased or changed.  Thank you MARY Francisco

## 2024-01-04 ENCOUNTER — TRANSFERRED RECORDS (OUTPATIENT)
Dept: HEALTH INFORMATION MANAGEMENT | Facility: CLINIC | Age: 84
End: 2024-01-04

## 2024-01-16 ENCOUNTER — TRANSFERRED RECORDS (OUTPATIENT)
Dept: HEALTH INFORMATION MANAGEMENT | Facility: CLINIC | Age: 84
End: 2024-01-16

## 2024-01-16 LAB — EJECTION FRACTION: NORMAL %

## 2024-01-17 ENCOUNTER — TRANSFERRED RECORDS (OUTPATIENT)
Dept: HEALTH INFORMATION MANAGEMENT | Facility: CLINIC | Age: 84
End: 2024-01-17

## 2024-01-17 LAB — EJECTION FRACTION: 56 %

## 2024-01-24 ENCOUNTER — TRANSFERRED RECORDS (OUTPATIENT)
Dept: HEALTH INFORMATION MANAGEMENT | Facility: CLINIC | Age: 84
End: 2024-01-24

## 2024-03-26 DIAGNOSIS — J45.40 MODERATE PERSISTENT ASTHMA WITHOUT COMPLICATION: ICD-10-CM

## 2024-04-04 ENCOUNTER — TRANSFERRED RECORDS (OUTPATIENT)
Dept: HEALTH INFORMATION MANAGEMENT | Facility: CLINIC | Age: 84
End: 2024-04-04
Payer: MEDICARE

## 2024-05-01 ENCOUNTER — OFFICE VISIT (OUTPATIENT)
Dept: CARDIOLOGY | Facility: CLINIC | Age: 84
End: 2024-05-01
Payer: MEDICARE

## 2024-05-01 VITALS
RESPIRATION RATE: 14 BRPM | BODY MASS INDEX: 30.9 KG/M2 | WEIGHT: 180 LBS | SYSTOLIC BLOOD PRESSURE: 168 MMHG | HEART RATE: 57 BPM | DIASTOLIC BLOOD PRESSURE: 62 MMHG | OXYGEN SATURATION: 96 %

## 2024-05-01 DIAGNOSIS — I50.32 CHRONIC DIASTOLIC CONGESTIVE HEART FAILURE (H): Primary | ICD-10-CM

## 2024-05-01 DIAGNOSIS — I35.0 NONRHEUMATIC AORTIC VALVE STENOSIS: ICD-10-CM

## 2024-05-01 DIAGNOSIS — I11.0 LVH (LEFT VENTRICULAR HYPERTROPHY) DUE TO HYPERTENSIVE DISEASE, WITH HEART FAILURE (H): ICD-10-CM

## 2024-05-01 DIAGNOSIS — I10 BENIGN ESSENTIAL HYPERTENSION: ICD-10-CM

## 2024-05-01 PROCEDURE — 99214 OFFICE O/P EST MOD 30 MIN: CPT | Performed by: INTERNAL MEDICINE

## 2024-05-01 RX ORDER — LEVOTHYROXINE SODIUM 50 MCG
1 TABLET ORAL DAILY
COMMUNITY
Start: 2024-03-30 | End: 2024-09-24

## 2024-05-01 RX ORDER — AMLODIPINE BESYLATE 10 MG/1
10 TABLET ORAL DAILY
Qty: 90 TABLET | Refills: 3 | Status: SHIPPED | OUTPATIENT
Start: 2024-05-01 | End: 2024-06-12

## 2024-05-01 RX ORDER — FUROSEMIDE 40 MG/1
1 TABLET ORAL DAILY
COMMUNITY
Start: 2024-04-04

## 2024-05-01 NOTE — PATIENT INSTRUCTIONS
Please contact direct nurses line Monday through Friday 8 AM to 5 PM @ [phone]    After-hours contact cardiology office at [phone].    Plan:    Increase amlodipine to 10 mg daily  Could consider Jardiance 10 mg daily

## 2024-05-01 NOTE — LETTER
5/1/2024    TONY Lara  [address] [place] [address]    RE: Katt Dsouza       Dear Colleague,     I had the pleasure of seeing Katt Dsouza in the [facility].    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         History of Present Illness/Subjective    HPI: Katt Dsouza is a 83 year old female congestive heart failure, hypertension, left hypertrophy, aortic stenosis, diabetes mellitus type 2 presents to cardiology clinic to establish care with myself.    Previously followed by Dr. Francisco    Patient was hospitalized in January 2024 for congestive heart failure.  There she was diuresed effectively with Lasix therapy.  She underwent echocardiogram which demonstrated preserved left ventricular ejection fraction with moderate left-ventricular appear to be in diastolic dysfunction of the left ventricle.  Aortic stenosis quantified as mild.  Patient also underwent stress testing with demonstrated preserved left ventricular ejection fraction with fixed perfusion defect in the inferior and apical segments no clear evidence of ischemia.    Currently she is doing well.  She notes improvement in her breathing.  She denies any significant pitting edema.  Energy levels improved with changing from hydrochlorothiazide to Lasix.  Blood pressure is elevated today.  Will titrate up amlodipine.    We discussed SGLT 2 inhibitors.    Echocardiogram  Results:  Outside records from [place] reviewed.  Under media tab     Physical Examination  Review of Systems   Vitals: BP (!) 168/62 (BP Location: Left arm, Patient Position: Sitting, Cuff Size: Adult Regular)   Pulse 57   Resp 14   Wt 81.6 kg (180 lb)   SpO2 96%   BMI 30.90 kg/m    BMI= Body mass index is 30.9 kg/m .  Wt Readings from Last 3 Encounters:   05/01/24 81.6 kg (180 lb)   11/07/23 86.2 kg (190 lb)   09/11/23 91.6 kg (202 lb)        Pleasant   ENT/Mouth: membranes moist, no oral lesions or bleeding gums.      EYES:  no scleral icterus, normal conjunctivae       Chest/Lungs:   lungs are clear to auscultation, no rales or wheezing, no sternal scar, equal chest wall expansion    Cardiovascular:   Regular. Normal first and second heart sounds with mid peaking systolic murmurs, rubs, or gallops; the carotid, radial and posterior tibial pulses are intact, Jugular venous pressure , trace edema bilaterally    Abdomen:  no tenderness; bowel sounds are present   Extremities: no cyanosis or clubbing   Skin: no xanthelasma, warm.    Neurologic: normal  bilateral, no tremors     Psychiatric: alert and oriented x3, calm        Please refer above for cardiac ROS details.        Medical History  Surgical History Family History Social History   Past Medical History:   Diagnosis Date    Diabetes (H)     Hypertension     Uncomplicated asthma      Past Surgical History:   Procedure Laterality Date    BACK SURGERY      LAMINECTOMY LUMBAR ONE LEVEL Bilateral 11/8/2022    Procedure: LUMBAR 3 - LUMBAR 4 BILATERAL MEDIAL FACETECTOMY, DECOMPRESSION PARTIAL DISCECTOMY;  Surgeon: Leno Recinos MD;  Location: [facility] Main OR     No family history on file.     Social History     Socioeconomic History    Marital status:      Spouse name: Not on file    Number of children: Not on file    Years of education: Not on file    Highest education level: Not on file   Occupational History    Not on file    Tobacco Use    Smoking status: Former     Current packs/day: 0.00     Types: Cigarettes     Quit date: 1995     Years since quittin.3    Smokeless tobacco: Never   Vaping Use    Vaping status: Never Used   Substance and Sexual Activity    Alcohol use: Yes     Comment: rare    Drug use: Never    Sexual activity: Not on file   Other Topics Concern    Not on file   Social History Narrative    Not on file     Social Determinants of Health     Financial Resource Strain: Not on file   Food Insecurity: Not on file   Transportation Needs: Not on file   Physical Activity: Not on file   Stress: Not on file   Social Connections: Not on file   Interpersonal Safety: Not on file   Housing Stability: Not on file           Medications  Allergies   Current Outpatient Medications   Medication Sig Dispense Refill    acetaminophen (TYLENOL) 325 MG tablet Take 1-2 tablets (325-650 mg) by mouth every 4 hours as needed for mild pain 50 tablet 0    albuterol (PROAIR HFA/PROVENTIL HFA/VENTOLIN HFA) 108 (90 Base) MCG/ACT inhaler Inhale 2 puffs into the lungs every 4 hours as needed for shortness of breath or wheezing 8.5 g 11    allopurinoL (ZYLOPRIM) 100 MG tablet [ALLOPURINOL (ZYLOPRIM) 100 MG TABLET] Take 100 mg by mouth daily.      amLODIPine (NORVASC) 10 MG tablet Take 5 mg by mouth daily      atorvastatin (LIPITOR) 20 MG tablet [ATORVASTATIN (LIPITOR) 20 MG TABLET] Take 20 mg by mouth at bedtime.      fluticasone (FLONASE) 50 MCG/ACT nasal spray spray 2 sprays into each nostril 2 (two) times a day. 16 g 11    Fluticasone-Umeclidin-Vilant (TRELEGY ELLIPTA) 100-62.5-25 MCG/ACT oral inhaler Inhale 1 puff into the lungs daily 60 each 11    gabapentin (NEURONTIN) 300 MG capsule Take 600 mg by mouth At Bedtime       LASIX 40 MG tablet Take 1 tablet by mouth daily      losartan (COZAAR) 100 MG tablet Take 100 mg by mouth daily      metFORMIN (GLUCOPHAGE XR) 500 MG 24 hr tablet Take 500 mg by mouth 2 times daily (with meals)       methocarbamol (ROBAXIN) 500 MG tablet Take 1 tablet (500 mg) by mouth every 6 hours as needed for muscle spasms (muscle spasm) 40 tablet 0    SYNTHROID 50 MCG tablet Take 1 tablet by mouth daily      zolpidem (AMBIEN) 10 MG tablet Take 1 tablet (10 mg) by mouth nightly as needed for sleep 30 tablet 0       Allergies   Allergen Reactions    Animal Dander Unknown    Penicillins Hives    Sulfa Antibiotics Hives          Lab Results    Chemistry/lipid CBC Cardiac Enzymes/BNP/TSH/INR   Recent Labs   Lab Test 11/08/23  1215   CHOL 158   HDL 53   LDL 85   TRIG 101     Recent Labs   Lab Test 11/08/23  1215 10/03/22  1127 03/03/21  1335   LDL 85 80 51     Recent Labs   Lab Test 11/08/23  1215      POTASSIUM 4.3   CHLORIDE 109*   CO2 23   *   BUN 32.8*   CR 1.22*   GFRESTIMATED 44*   MARIO 9.2     Recent Labs   Lab Test 11/08/23  1215 09/11/23  1113 06/29/23  1336   CR 1.22* 1.07* 1.19*     Recent Labs   Lab Test 11/08/23  1215   A1C 6.5*          Recent Labs   Lab Test 09/11/23  1142   WBC 7.8   HGB 13.3   HCT 40.1   MCV 92        Recent Labs   Lab Test 09/11/23  1142 11/09/22  1038 05/19/22  1428   HGB 13.3 12.1 13.7    Recent Labs   Lab Test 01/28/22  2151 01/28/22  1831   TROPONINI 0.03 0.01     Recent Labs   Lab Test 09/11/23  1113 05/19/22  1428 04/26/21  1455 03/24/21  1219 07/05/19  1037   BNP  --  78 48  --  40   NTBNP 302  --   --  113  --      Recent Labs   Lab Test 05/19/22  1428   TSH 2.55     Recent Labs   Lab Test 01/28/22  1831   INR 1.01        David Holman DO  Cardiologist     33 minutes spent by me on the date of the encounter doing chart review, history and exam, documentation and further activities per the note      Thank you for allowing me to participate in the care of your patient.      Sincerely,     David Holman DO     [facility]  cc:   Reymundo Francisco MD  [facility]  [address]  [place] [address]

## 2024-05-23 ENCOUNTER — NURSE TRIAGE (OUTPATIENT)
Dept: CARDIOLOGY | Facility: CLINIC | Age: 84
End: 2024-05-23
Payer: MEDICARE

## 2024-05-23 NOTE — TELEPHONE ENCOUNTER
"Received a call from the call center to discuss a tingling concern.  Spoke to Katt, who says she woke up in the night with left neck and shoulder tingling and has been constant all morning, rating it #3-4. She describes it as \"above the heart, going from the top of the shoulder to the armpit area\". She has had the shoulder tingling in the past, but not the neck tingling, which is new. She says she has \"a little bit\" of shortness of breath. She denies chest pain.   She is unable to check VS as she left her BP monitor cuff in [place]. She has a pulse oximeter showing 94, but she does not know if it is oxygen level or heart rate.  She has taken amlodipine and furosemide as prescribed today.  She thought she should notify her cardiologist.  Advised a message will be sent to [place] [facility] for follow up.      1. REASON FOR CALL or QUESTION: \"What is your reason for calling today?\" or \"How can I best help you?\" or \"What question do you have that I can help answer?\"  "

## 2024-05-24 ENCOUNTER — LAB REQUISITION (OUTPATIENT)
Dept: LAB | Facility: CLINIC | Age: 84
End: 2024-05-24
Payer: MEDICARE

## 2024-05-24 DIAGNOSIS — E03.9 HYPOTHYROIDISM, UNSPECIFIED: ICD-10-CM

## 2024-05-24 DIAGNOSIS — I50.32 CHRONIC DIASTOLIC (CONGESTIVE) HEART FAILURE (H): ICD-10-CM

## 2024-05-24 DIAGNOSIS — E78.2 MIXED HYPERLIPIDEMIA: ICD-10-CM

## 2024-05-24 DIAGNOSIS — R10.32 LEFT LOWER QUADRANT PAIN: ICD-10-CM

## 2024-05-24 NOTE — TELEPHONE ENCOUNTER
"Dr Garcia, please review. Neck arthritis/stiffness cause of tingling? Cardiac related? Recommendations? KATYA,RN   _____________________________________________________________________________  PC with patient. Wednesday, 5/22/24, patient felt fine all day. No concerns or symptoms throughout the day. Went to bed feeling no different. Awoke in the early hours, Thursday of the night/next morning \"a tingling ache\" left side of neck down into the left shoulder, and down to her left elbow. Maybe a little shortness of breath noted upon arising. Denies dizziness, lightheadedness, palpitations, chest pain, pressure or tightness. Denies back pain, or jaw pain. No heartburn. No sweating or clammy. Resolved some time in the early part of the day. No interventions or medications utilized, just resolved. Noting also that her neck had felt stiff.     Since office visit with MAT feeling good. Furosemide increase, down 18 lbs. Reporting, \" I must of looked like a watermelon before\". Currently denies any abdominal bloat, lightheadedness or dizziness. Mild LE edema. Last night no return of symptoms. Today, her neck is still stiff on the left side. Just noticeable with palpation. Feels good otherwise. No return of symptoms. Denies any acute illness or fevers. Appetite normal. No other questions or concerns. No blood pressure or HR readings to share. Left her BP cuff in [place]. May purchase one today.      Office visit with PCP today at 1130. Cardiology follow-up arranged for 6/12/24 with . Encouraged pt to call writer if PCP feels any concerns, and/or to attempt for sooner appointment.     Review of when to present to the ER with concerns of chest pain. Discussion of not presenting to an Urgent care as would not have level of evaluation needed to fully assess. [facility] closest to home, encouraged to present, IF needed in the future for thorough evaluation. Will route to [facility] for review, and return call. Given direct office " number to call if needed after OV, or in the future. CMM,RN

## 2024-05-24 NOTE — TELEPHONE ENCOUNTER
===View-only below this line===  ----- Message -----  From: David Holman DO  Sent: 5/24/2024   2:56 PM CDT  To: Mikal Crandall RN    Agree with PCP visit and MH visit.

## 2024-05-24 NOTE — TELEPHONE ENCOUNTER
"PC with patient and discussion. Review of provider response. Had OV with PCP, whom thought it could be related to her Trapezius muscle. Encouraged to keep upcoming OV as arranged with MH. Call in between office visits if the pain returns to report. Pt states at time of call she feels \"fine\" Denies new symptoms or concerns. Has direct office number and clinic number to call, if needed. KATYARn   "

## 2024-05-28 LAB
ALBUMIN SERPL BCG-MCNC: 4 G/DL (ref 3.5–5.2)
ALP SERPL-CCNC: 92 U/L (ref 40–150)
ALT SERPL W P-5'-P-CCNC: 18 U/L (ref 0–50)
ANION GAP SERPL CALCULATED.3IONS-SCNC: 21 MMOL/L (ref 7–15)
AST SERPL W P-5'-P-CCNC: 24 U/L (ref 0–45)
BILIRUB SERPL-MCNC: 0.2 MG/DL
BUN SERPL-MCNC: 34.2 MG/DL (ref 8–23)
CALCIUM SERPL-MCNC: 9.2 MG/DL (ref 8.8–10.2)
CHLORIDE SERPL-SCNC: 100 MMOL/L (ref 98–107)
CHOLEST SERPL-MCNC: 166 MG/DL
CREAT SERPL-MCNC: 1.41 MG/DL (ref 0.51–0.95)
DEPRECATED HCO3 PLAS-SCNC: 29 MMOL/L (ref 22–29)
EGFRCR SERPLBLD CKD-EPI 2021: 37 ML/MIN/1.73M2
ERYTHROCYTE [DISTWIDTH] IN BLOOD BY AUTOMATED COUNT: 15.4 % (ref 10–15)
FASTING STATUS PATIENT QL REPORTED: ABNORMAL
FASTING STATUS PATIENT QL REPORTED: ABNORMAL
GLUCOSE SERPL-MCNC: 114 MG/DL (ref 70–99)
HCT VFR BLD AUTO: 39 % (ref 35–47)
HDLC SERPL-MCNC: 59 MG/DL
HGB BLD-MCNC: 12.6 G/DL (ref 11.7–15.7)
LDLC SERPL CALC-MCNC: 74 MG/DL
MCH RBC QN AUTO: 29.4 PG (ref 26.5–33)
MCHC RBC AUTO-ENTMCNC: 32.3 G/DL (ref 31.5–36.5)
MCV RBC AUTO: 91 FL (ref 78–100)
NONHDLC SERPL-MCNC: 107 MG/DL
PLATELET # BLD AUTO: 312 10E3/UL (ref 150–450)
POTASSIUM SERPL-SCNC: 3.8 MMOL/L (ref 3.4–5.3)
PROT SERPL-MCNC: 7 G/DL (ref 6.4–8.3)
RBC # BLD AUTO: 4.29 10E6/UL (ref 3.8–5.2)
SODIUM SERPL-SCNC: 150 MMOL/L (ref 135–145)
T4 FREE SERPL-MCNC: 1.28 NG/DL (ref 0.9–1.7)
TRIGL SERPL-MCNC: 165 MG/DL
TSH SERPL DL<=0.005 MIU/L-ACNC: 1.11 UIU/ML (ref 0.3–4.2)
WBC # BLD AUTO: 9.4 10E3/UL (ref 4–11)

## 2024-05-28 PROCEDURE — 84443 ASSAY THYROID STIM HORMONE: CPT | Mod: ORL | Performed by: PHYSICIAN ASSISTANT

## 2024-05-28 PROCEDURE — 80061 LIPID PANEL: CPT | Mod: ORL | Performed by: PHYSICIAN ASSISTANT

## 2024-05-28 PROCEDURE — 84439 ASSAY OF FREE THYROXINE: CPT | Mod: ORL | Performed by: PHYSICIAN ASSISTANT

## 2024-05-28 PROCEDURE — 85027 COMPLETE CBC AUTOMATED: CPT | Mod: ORL | Performed by: PHYSICIAN ASSISTANT

## 2024-05-28 PROCEDURE — 80053 COMPREHEN METABOLIC PANEL: CPT | Mod: ORL | Performed by: PHYSICIAN ASSISTANT

## 2024-06-03 ENCOUNTER — TRANSFERRED RECORDS (OUTPATIENT)
Dept: HEALTH INFORMATION MANAGEMENT | Facility: CLINIC | Age: 84
End: 2024-06-03
Payer: MEDICARE

## 2024-06-12 ENCOUNTER — TELEPHONE (OUTPATIENT)
Dept: CARDIOLOGY | Facility: CLINIC | Age: 84
End: 2024-06-12

## 2024-06-12 ENCOUNTER — OFFICE VISIT (OUTPATIENT)
Dept: CARDIOLOGY | Facility: CLINIC | Age: 84
End: 2024-06-12
Attending: INTERNAL MEDICINE
Payer: MEDICARE

## 2024-06-12 VITALS
HEIGHT: 64 IN | DIASTOLIC BLOOD PRESSURE: 48 MMHG | RESPIRATION RATE: 16 BRPM | HEART RATE: 74 BPM | BODY MASS INDEX: 33.29 KG/M2 | WEIGHT: 195 LBS | SYSTOLIC BLOOD PRESSURE: 152 MMHG

## 2024-06-12 DIAGNOSIS — I50.32 CHRONIC DIASTOLIC CONGESTIVE HEART FAILURE (H): ICD-10-CM

## 2024-06-12 DIAGNOSIS — I11.0 LVH (LEFT VENTRICULAR HYPERTROPHY) DUE TO HYPERTENSIVE DISEASE, WITH HEART FAILURE (H): ICD-10-CM

## 2024-06-12 DIAGNOSIS — I10 BENIGN ESSENTIAL HYPERTENSION: Primary | ICD-10-CM

## 2024-06-12 DIAGNOSIS — I35.0 NONRHEUMATIC AORTIC VALVE STENOSIS: ICD-10-CM

## 2024-06-12 DIAGNOSIS — I10 BENIGN ESSENTIAL HYPERTENSION: ICD-10-CM

## 2024-06-12 PROCEDURE — 99214 OFFICE O/P EST MOD 30 MIN: CPT

## 2024-06-12 PROCEDURE — G2211 COMPLEX E/M VISIT ADD ON: HCPCS

## 2024-06-12 RX ORDER — AMLODIPINE BESYLATE 10 MG/1
10 TABLET ORAL DAILY
Qty: 90 TABLET | Refills: 3 | Status: SHIPPED | OUTPATIENT
Start: 2024-06-12

## 2024-06-12 RX ORDER — CARVEDILOL 3.12 MG/1
3.12 TABLET ORAL 2 TIMES DAILY WITH MEALS
Qty: 60 TABLET | Refills: 11 | Status: ON HOLD | OUTPATIENT
Start: 2024-06-12 | End: 2024-09-25

## 2024-06-12 RX ORDER — CARVEDILOL 3.12 MG/1
3.12 TABLET ORAL 2 TIMES DAILY WITH MEALS
Qty: 30 TABLET | Refills: 11 | Status: SHIPPED | OUTPATIENT
Start: 2024-06-12 | End: 2024-06-12

## 2024-06-12 NOTE — PROGRESS NOTES
HEART CARE ENCOUNTER CONSULTATON NOTE      [facility]  [phone]      Assessment/Recommendations   Assessment:   Chronic congestive heart failure, preserved EF: Compensated, although noticing some increased ankle swelling following increase in amlodipine  Moderate LVH, grade 1 diastolic dysfunction   Hypertension: Elevated and similar on repeat on amlodipine 10 mg, losartan 100 mg   - Systolic consistently closer ro 140 at home, although recently well-controlled measurement at PCP visit 6/3  - Noted history of reduced energy on HCTZ  Mild aortic stenosis, and moderate regurgitation: Echo 1/2024  T2DM: A1c 5.8       Plan:   Start carvedilol 3.125 mg twice daily, goal less than 130/80 consistently with HFpEF/LVH.  Discussed side effects to monitor for and report intolerance.  Continue furosemide 40 mg once daily  Continue losartan and amlodpine  Monitor weight, blood pressure, swelling closely.  Report changes.      Follow up in 2-3 months with me for hypertension follow-up     History of Present Illness/Subjective    HPI: Katt Dsouza is a 83 year old female with PMHx of HFpEF, HTN, mild aortic stenosis/regurgitation, T2DM presents for follow-up.  Hospitalization with heart failure 1/2024 with preserved EF on echo and no evidence of ischemia on stress test.    There is some evidence of weight gain upon chart review, but patient notes home.  Her blood pressure more consistently 130-140s recent PCP visit was well-controlled.  Swelling is maybe a little worse after increase in amlodipine, but she denies any other, shortness of breath.  PCP noted some evidence of dehydration on recent BMP, recommended hydration.    She denies lightheadedness, shortness of breath, dyspnea on exertion, orthopnea, PND, palpitations, and chest pain.      Echocardiogram and NM stress test 1/2024 Results scanned in chart       Physical Examination  Review of Systems   Vitals: BP (!) 152/48 (BP Location: Right arm,  "Patient Position: Sitting, Cuff Size: Adult Large)   Pulse 74   Resp 16   Ht 1.626 m (5' 4\")   Wt 88.5 kg (195 lb)   BMI 33.47 kg/m    BMI= Body mass index is 33.47 kg/m .  Wt Readings from Last 3 Encounters:   24 88.5 kg (195 lb)   24 81.6 kg (180 lb)   23 86.2 kg (190 lb)           ENT/Mouth: membranes moist, no oral lesions or bleeding gums.      EYES:  no scleral icterus, normal conjunctivae       Chest/Lungs:   lungs are clear to auscultation, no rales or wheezing, , equal chest wall expansion    Cardiovascular:   Regular. Normal first and second heart sounds with no murmurs, rubs, or gallops; the radial and posterior tibial pulses are intact, Jugular venous pressure normal, absent edema bilaterally        Extremities: no cyanosis or clubbing   Skin: no xanthelasma, warm.    Neurologic: no tremors     Psychiatric: alert and oriented x3, calm        Please refer above for cardiac ROS details.        Medical History  Surgical History Family History Social History   Past Medical History:   Diagnosis Date    Diabetes (H)     Hypertension     Uncomplicated asthma      Past Surgical History:   Procedure Laterality Date    BACK SURGERY      LAMINECTOMY LUMBAR ONE LEVEL Bilateral 2022    Procedure: LUMBAR 3 - LUMBAR 4 BILATERAL MEDIAL FACETECTOMY, DECOMPRESSION PARTIAL DISCECTOMY;  Surgeon: Leno Recinos MD;  Location: [facility] Main OR     No family history on file.     Social History     Socioeconomic History    Marital status:      Spouse name: Not on file    Number of children: Not on file    Years of education: Not on file    Highest education level: Not on file   Occupational History    Not on file   Tobacco Use    Smoking status: Former     Current packs/day: 0.00     Types: Cigarettes     Quit date: 1995     Years since quittin.4    Smokeless tobacco: Never   Vaping Use    Vaping status: Never Used   Substance and Sexual Activity    Alcohol use: Yes "     Comment: rare    Drug use: Never    Sexual activity: Not on file   Other Topics Concern    Not on file   Social History Narrative    Not on file     Social Determinants of Health     Financial Resource Strain: Not on file   Food Insecurity: Not on file   Transportation Needs: Not on file   Physical Activity: Not on file   Stress: Not on file   Social Connections: Not on file   Interpersonal Safety: Not on file   Housing Stability: Not on file           Medications  Allergies   Current Outpatient Medications   Medication Sig Dispense Refill    acetaminophen (TYLENOL) 325 MG tablet Take 1-2 tablets (325-650 mg) by mouth every 4 hours as needed for mild pain 50 tablet 0    albuterol (PROAIR HFA/PROVENTIL HFA/VENTOLIN HFA) 108 (90 Base) MCG/ACT inhaler Inhale 2 puffs into the lungs every 4 hours as needed for shortness of breath or wheezing 8.5 g 11    allopurinoL (ZYLOPRIM) 100 MG tablet [ALLOPURINOL (ZYLOPRIM) 100 MG TABLET] Take 100 mg by mouth daily.      amLODIPine (NORVASC) 10 MG tablet Take 1 tablet (10 mg) by mouth daily 90 tablet 3    atorvastatin (LIPITOR) 20 MG tablet [ATORVASTATIN (LIPITOR) 20 MG TABLET] Take 20 mg by mouth at bedtime.      fluticasone (FLONASE) 50 MCG/ACT nasal spray spray 2 sprays into each nostril 2 (two) times a day. 16 g 11    Fluticasone-Umeclidin-Vilant (TRELEGY ELLIPTA) 100-62.5-25 MCG/ACT oral inhaler Inhale 1 puff into the lungs daily 60 each 11    gabapentin (NEURONTIN) 300 MG capsule Take 600 mg by mouth At Bedtime       LASIX 40 MG tablet Take 1 tablet by mouth daily      losartan (COZAAR) 100 MG tablet Take 100 mg by mouth daily      metFORMIN (GLUCOPHAGE XR) 500 MG 24 hr tablet Take 500 mg by mouth 2 times daily (with meals)      methocarbamol (ROBAXIN) 500 MG tablet Take 1 tablet (500 mg) by mouth every 6 hours as needed for muscle spasms (muscle spasm) 40 tablet 0    SYNTHROID 50 MCG tablet Take 1 tablet by mouth daily      zolpidem (AMBIEN) 10 MG tablet Take 1 tablet  (10 mg) by mouth nightly as needed for sleep 30 tablet 0       Allergies   Allergen Reactions    Animal Dander Unknown    Penicillins Hives    Sulfa Antibiotics Hives          Lab Results    Chemistry/lipid CBC Cardiac Enzymes/BNP/TSH/INR   Recent Labs   Lab Test 05/28/24  1512   CHOL 166   HDL 59   LDL 74   TRIG 165*     Recent Labs   Lab Test 05/28/24  1512 11/08/23  1215 10/03/22  1127   LDL 74 85 80     Recent Labs   Lab Test 05/28/24  1512   *   POTASSIUM 3.8   CHLORIDE 100   CO2 29   *   BUN 34.2*   CR 1.41*   GFRESTIMATED 37*   MARIO 9.2     Recent Labs   Lab Test 05/28/24  1512 11/08/23  1215 09/11/23  1113   CR 1.41* 1.22* 1.07*     Recent Labs   Lab Test 11/08/23  1215   A1C 6.5*          Recent Labs   Lab Test 05/28/24  1512   WBC 9.4   HGB 12.6   HCT 39.0   MCV 91        Recent Labs   Lab Test 05/28/24  1512 09/11/23  1142 11/09/22  1038   HGB 12.6 13.3 12.1    Recent Labs   Lab Test 01/28/22  2151 01/28/22  1831   TROPONINI 0.03 0.01     Recent Labs   Lab Test 09/11/23  1113 05/19/22  1428 04/26/21  1455 03/24/21  1219 07/05/19  1037   BNP  --  78 48  --  40   NTBNP 302  --   --  113  --      Recent Labs   Lab Test 05/28/24  1512   TSH 1.11     Recent Labs   Lab Test 01/28/22  1831   INR 1.01          This note has been dictated using voice recognition software. Any grammatical, typographical, or context distortions are unintentional and inherent to the software    Lottie Murillo PA-C

## 2024-06-12 NOTE — PATIENT INSTRUCTIONS
It was a pleasure taking part in your care today:    - Start carvedilol 3.125 mg twice daily bout every 8-12 hours. Monitor for lightheadedness or fatigue, bpm <60.   - Continue losartan and amlodipine  - Would be supportive of Farxiga if decided to start   - Monitor blood pressure <130/80, weight, breathing, swelling    Please call the [facility] clinic with any questions or concerns at [phone].     Lottie Murillo PA-C

## 2024-06-12 NOTE — TELEPHONE ENCOUNTER
[facility] Call Center    Phone Message    May a detailed message be left on voicemail: yes     Reason for Call: Medication Question or concern regarding medication   Prescription Clarification  Name of Medication: carvedilol (COREG) 3.125 MG tablet   Prescribing Provider: Lottie Murillo   Pharmacy:    [id number] - [place] [id number] [facility]        What on the order needs clarification? The patient will have to get a refill every 15 days and the pharmacy asked if they could get a whole month supply at one time please.      Action Taken: Other: Cardiology    Travel Screening: Not Applicable    Thank you!  [facility]       Date of Service:

## 2024-06-12 NOTE — LETTER
6/12/2024    TONY Lara  [address] [place] [address]    RE: Katt Dsouza       Dear Colleague,     I had the pleasure of seeing Katt Dsouza in the [facility].    HEART CARE ENCOUNTER CONSULTATON NOTE      [facility]  [phone]      Assessment/Recommendations   Assessment:   Chronic congestive heart failure, preserved EF: Compensated, although noticing some increased ankle swelling following increase in amlodipine  Moderate LVH, grade 1 diastolic dysfunction   Hypertension: Elevated and similar on repeat on amlodipine 10 mg, losartan 100 mg   - Systolic consistently closer ro 140 at home, although recently well-controlled measurement at PCP visit 6/3  - Noted history of reduced energy on HCTZ  Mild aortic stenosis, and moderate regurgitation: Echo 1/2024  T2DM: A1c 5.8       Plan:   Start carvedilol 3.125 mg twice daily, goal less than 130/80 consistently with HFpEF/LVH.  Discussed side effects to monitor for and report intolerance.  Continue furosemide 40 mg once daily  Continue losartan and amlodpine  Monitor weight, blood pressure, swelling closely.  Report changes.      Follow up in 2-3 months with me for hypertension follow-up     History of Present Illness/Subjective    HPI: Katt Dsouza is a 83 year old female with PMHx of HFpEF, HTN, mild aortic stenosis/regurgitation, T2DM presents for follow-up.  Hospitalization with heart failure 1/2024 with preserved EF on echo and no evidence of ischemia on stress test.    There is some evidence of weight gain upon chart review, but patient notes home.  Her blood pressure more consistently 130-140s recent PCP visit was well-controlled.  Swelling is maybe a little worse after increase in amlodipine, but she denies any other, shortness of breath.  PCP noted some evidence of dehydration on recent BMP, recommended hydration.    She denies lightheadedness, shortness of breath, dyspnea on exertion, orthopnea, PND,  "palpitations, and chest pain.      Echocardiogram and NM stress test 1/2024 Results scanned in chart       Physical Examination  Review of Systems   Vitals: BP (!) 152/48 (BP Location: Right arm, Patient Position: Sitting, Cuff Size: Adult Large)   Pulse 74   Resp 16   Ht 1.626 m (5' 4\")   Wt 88.5 kg (195 lb)   BMI 33.47 kg/m    BMI= Body mass index is 33.47 kg/m .  Wt Readings from Last 3 Encounters:   06/12/24 88.5 kg (195 lb)   05/01/24 81.6 kg (180 lb)   11/07/23 86.2 kg (190 lb)           ENT/Mouth: membranes moist, no oral lesions or bleeding gums.      EYES:  no scleral icterus, normal conjunctivae       Chest/Lungs:   lungs are clear to auscultation, no rales or wheezing, , equal chest wall expansion    Cardiovascular:   Regular. Normal first and second heart sounds with no murmurs, rubs, or gallops; the radial and posterior tibial pulses are intact, Jugular venous pressure normal, absent edema bilaterally        Extremities: no cyanosis or clubbing   Skin: no xanthelasma, warm.    Neurologic: no tremors     Psychiatric: alert and oriented x3, calm        Please refer above for cardiac ROS details.        Medical History  Surgical History Family History Social History   Past Medical History:   Diagnosis Date    Diabetes (H)     Hypertension     Uncomplicated asthma      Past Surgical History:   Procedure Laterality Date    BACK SURGERY      LAMINECTOMY LUMBAR ONE LEVEL Bilateral 11/8/2022    Procedure: LUMBAR 3 - LUMBAR 4 BILATERAL MEDIAL FACETECTOMY, DECOMPRESSION PARTIAL DISCECTOMY;  Surgeon: Leno Recinos MD;  Location: [facility] Main OR     No family history on file.     Social History     Socioeconomic History    Marital status:      Spouse name: Not on file    Number of children: Not on file    Years of education: Not on file    Highest education level: Not on file   Occupational History    Not on file   Tobacco Use    Smoking status: Former     Current packs/day: 0.00     " Types: Cigarettes     Quit date: 1995     Years since quittin.4    Smokeless tobacco: Never   Vaping Use    Vaping status: Never Used   Substance and Sexual Activity    Alcohol use: Yes     Comment: rare    Drug use: Never    Sexual activity: Not on file   Other Topics Concern    Not on file   Social History Narrative    Not on file     Social Determinants of Health     Financial Resource Strain: Not on file   Food Insecurity: Not on file   Transportation Needs: Not on file   Physical Activity: Not on file   Stress: Not on file   Social Connections: Not on file   Interpersonal Safety: Not on file   Housing Stability: Not on file           Medications  Allergies   Current Outpatient Medications   Medication Sig Dispense Refill    acetaminophen (TYLENOL) 325 MG tablet Take 1-2 tablets (325-650 mg) by mouth every 4 hours as needed for mild pain 50 tablet 0    albuterol (PROAIR HFA/PROVENTIL HFA/VENTOLIN HFA) 108 (90 Base) MCG/ACT inhaler Inhale 2 puffs into the lungs every 4 hours as needed for shortness of breath or wheezing 8.5 g 11    allopurinoL (ZYLOPRIM) 100 MG tablet [ALLOPURINOL (ZYLOPRIM) 100 MG TABLET] Take 100 mg by mouth daily.      amLODIPine (NORVASC) 10 MG tablet Take 1 tablet (10 mg) by mouth daily 90 tablet 3    atorvastatin (LIPITOR) 20 MG tablet [ATORVASTATIN (LIPITOR) 20 MG TABLET] Take 20 mg by mouth at bedtime.      fluticasone (FLONASE) 50 MCG/ACT nasal spray spray 2 sprays into each nostril 2 (two) times a day. 16 g 11    Fluticasone-Umeclidin-Vilant (TRELEGY ELLIPTA) 100-62.5-25 MCG/ACT oral inhaler Inhale 1 puff into the lungs daily 60 each 11    gabapentin (NEURONTIN) 300 MG capsule Take 600 mg by mouth At Bedtime       LASIX 40 MG tablet Take 1 tablet by mouth daily      losartan (COZAAR) 100 MG tablet Take 100 mg by mouth daily      metFORMIN (GLUCOPHAGE XR) 500 MG 24 hr tablet Take 500 mg by mouth 2 times daily (with meals)      methocarbamol (ROBAXIN) 500 MG tablet Take 1 tablet  (500 mg) by mouth every 6 hours as needed for muscle spasms (muscle spasm) 40 tablet 0    SYNTHROID 50 MCG tablet Take 1 tablet by mouth daily      zolpidem (AMBIEN) 10 MG tablet Take 1 tablet (10 mg) by mouth nightly as needed for sleep 30 tablet 0       Allergies   Allergen Reactions    Animal Dander Unknown    Penicillins Hives    Sulfa Antibiotics Hives          Lab Results    Chemistry/lipid CBC Cardiac Enzymes/BNP/TSH/INR   Recent Labs   Lab Test 05/28/24  1512   CHOL 166   HDL 59   LDL 74   TRIG 165*     Recent Labs   Lab Test 05/28/24  1512 11/08/23  1215 10/03/22  1127   LDL 74 85 80     Recent Labs   Lab Test 05/28/24  1512   *   POTASSIUM 3.8   CHLORIDE 100   CO2 29   *   BUN 34.2*   CR 1.41*   GFRESTIMATED 37*   MARIO 9.2     Recent Labs   Lab Test 05/28/24  1512 11/08/23  1215 09/11/23  1113   CR 1.41* 1.22* 1.07*     Recent Labs   Lab Test 11/08/23  1215   A1C 6.5*          Recent Labs   Lab Test 05/28/24  1512   WBC 9.4   HGB 12.6   HCT 39.0   MCV 91        Recent Labs   Lab Test 05/28/24  1512 09/11/23  1142 11/09/22  1038   HGB 12.6 13.3 12.1    Recent Labs   Lab Test 01/28/22  2151 01/28/22  1831   TROPONINI 0.03 0.01     Recent Labs   Lab Test 09/11/23  1113 05/19/22  1428 04/26/21  1455 03/24/21  1219 07/05/19  1037   BNP  --  78 48  --  40   NTBNP 302  --   --  113  --      Recent Labs   Lab Test 05/28/24  1512   TSH 1.11     Recent Labs   Lab Test 01/28/22  1831   INR 1.01          This note has been dictated using voice recognition software. Any grammatical, typographical, or context distortions are unintentional and inherent to the software    Lottie Murillo PA-C    Thank you for allowing me to participate in the care of your patient.      Sincerely,     Lottie House PA-C     [facility]  cc:   David Holman DO  [address] [place] [address]

## 2024-06-29 ENCOUNTER — HEALTH MAINTENANCE LETTER (OUTPATIENT)
Age: 84
End: 2024-06-29

## 2024-08-21 ENCOUNTER — TRANSFERRED RECORDS (OUTPATIENT)
Dept: HEALTH INFORMATION MANAGEMENT | Facility: CLINIC | Age: 84
End: 2024-08-21

## 2024-08-30 ENCOUNTER — TELEPHONE (OUTPATIENT)
Dept: PULMONOLOGY | Facility: CLINIC | Age: 84
End: 2024-08-30
Payer: MEDICARE

## 2024-08-30 DIAGNOSIS — J45.41 MODERATE PERSISTENT ASTHMA WITH EXACERBATION: Primary | ICD-10-CM

## 2024-08-30 RX ORDER — PREDNISONE 20 MG/1
40 TABLET ORAL DAILY
Qty: 14 TABLET | Refills: 0 | Status: SHIPPED | OUTPATIENT
Start: 2024-08-30 | End: 2024-09-06

## 2024-08-30 NOTE — TELEPHONE ENCOUNTER
Called Katt in regards to her appointment scheduled for 9/4/24. She is having increase of shortness of breath and cough with some yellow colored phlegm. Denies fevers or chest congestion. She made the appointment because she felt her Trelegy stopped working not realizing this could be a flare up of her asthma symptoms. Per Batsheva Huerta CNP action plan:   Action plan: prednisone 40mg x7 days.     Will send this presciption to her Cub. She will cancel her appointment for 9/4/24 and call our nurse line number directly next week to give us an update. She agrees to go to urgent care or ER if her symptoms do not improve.

## 2024-09-08 NOTE — PROGRESS NOTES
HEART CARE ENCOUNTER CONSULTATON NOTE      [facility]  [phone]      Assessment/Recommendations   Assessment:   Shortness of breath with cough/phlegm: x3-4 weeks, some improvement on prednisone short course but no resolution.  More consistent with pulmonary origin.  No evidence of pulmonary vascular congestion on exam, and stable baseline edema although there is evidence of weight gain.  Gain may be due to improved appetite after returning from [place] this spring.  No CAD per CCTA 2019, nonischemic NM stress test with fixed defect 1/2024.  Chronic congestive heart failure, preserved EF: Compensated, although noticing some increased ankle swelling following increase in amlodipine  Moderate LVH, grade 1 diastolic dysfunction   Hypertension: Improved 138/55 on amlodipine 10 mg, losartan 100 mg, carvedilol 3.125 twice daily  - Noted history of reduced energy on hydrochlorothiazide.   Mild aortic stenosis, and moderate regurgitation: Echo 1/2024  T2DM: A1c 5.8       Plan:   Continue current antihypertensive regimen  Continue furosemide 40 mg once daily.  Will repeat BMP/BNP today given shortness of breath and weight gain, abnormalities on CMP 3 months ago performed by PCP.  Reports she may be dehydrated today.  Monitor weight, blood pressure, swelling closely.  Report changes.  He has follow-up with pulmonary clinic in October, encouraged her to reach out after completing prednisone to discuss current symptoms.      Follow up in 6 months with Dr. Holman     History of Present Illness/Subjective    HPI: Katt Dsouza is a 83 year old female with PMHx of HFpEF, HTN, mild aortic stenosis/regurgitation, T2DM presents for Hypertension follow-up. Blood pressure is better 130 systolic at home more regularly on carvedilol.    Swelling is stable more shortness of breath x3-4 weeks in addition to cough with phlegm. Feels like a tightness in her chest/neck with exertion.  Nothing at rest. Not resolved,  slightly improved breathing after prednisone x 5 days. Not lightheadedness or dizzy.  Her weight is up 7-8 pounds in the last 2-3 months cording to clinic measurements.  Says she did not feel well in [place] last winter, and when she returned in the spring her appetite returned and these may reflect increased.  Not take her weight daily but thinks she is staying around 190 pounds at home scale .      She denies lightheadedness, orthopnea, PND, palpitations, and chest pain.      Echocardiogram and NM stress test 1/2024 Results scanned in chart       Physical Examination  Review of Systems   Vitals: /55 (BP Location: Right arm, Patient Position: Sitting, Cuff Size: Adult Large)   Pulse 55   Resp 14   Wt 92.1 kg (203 lb)   BMI 34.84 kg/m    BMI= Body mass index is 34.84 kg/m .  Wt Readings from Last 3 Encounters:   09/09/24 92.1 kg (203 lb)   06/12/24 88.5 kg (195 lb)   05/01/24 81.6 kg (180 lb)           ENT/Mouth: membranes moist, no oral lesions or bleeding gums.      EYES:  no scleral icterus, normal conjunctivae       Chest/Lungs:   lungs are clear to auscultation, no rales or wheezing, , equal chest wall expansion    Cardiovascular:   Regular. Normal first and second heart sounds with 2/6 systolic murmur, no rubs, or gallops; the radial and posterior tibial pulses are intact, Jugular venous pressure normal, minimal edema bilaterally        Extremities: no cyanosis or clubbing   Skin: no xanthelasma, warm.    Neurologic: no tremors     Psychiatric: alert and oriented x3, calm        Please refer above for cardiac ROS details.        Medical History  Surgical History Family History Social History   Past Medical History:   Diagnosis Date    Diabetes (H)     Hypertension     Uncomplicated asthma      Past Surgical History:   Procedure Laterality Date    BACK SURGERY      LAMINECTOMY LUMBAR ONE LEVEL Bilateral 11/8/2022    Procedure: LUMBAR 3 - LUMBAR 4 BILATERAL MEDIAL FACETECTOMY, DECOMPRESSION PARTIAL  DISCECTOMY;  Surgeon: Leno Recinos MD;  Location: [facility] Main OR     No family history on file.     Social History     Socioeconomic History    Marital status:      Spouse name: Not on file    Number of children: Not on file    Years of education: Not on file    Highest education level: Not on file   Occupational History    Not on file   Tobacco Use    Smoking status: Former     Current packs/day: 0.00     Types: Cigarettes     Quit date: 1995     Years since quittin.7    Smokeless tobacco: Never   Vaping Use    Vaping status: Never Used   Substance and Sexual Activity    Alcohol use: Yes     Comment: rare    Drug use: Never    Sexual activity: Not on file   Other Topics Concern    Not on file   Social History Narrative    Not on file     Social Determinants of Health     Financial Resource Strain: Not on file   Food Insecurity: Not on file   Transportation Needs: Not on file   Physical Activity: Not on file   Stress: Not on file   Social Connections: Not on file   Interpersonal Safety: Not on file   Housing Stability: Not on file           Medications  Allergies   Current Outpatient Medications   Medication Sig Dispense Refill    acetaminophen (TYLENOL) 325 MG tablet Take 1-2 tablets (325-650 mg) by mouth every 4 hours as needed for mild pain 50 tablet 0    albuterol (PROAIR HFA/PROVENTIL HFA/VENTOLIN HFA) 108 (90 Base) MCG/ACT inhaler Inhale 2 puffs into the lungs every 4 hours as needed for shortness of breath or wheezing 8.5 g 11    allopurinoL (ZYLOPRIM) 100 MG tablet [ALLOPURINOL (ZYLOPRIM) 100 MG TABLET] Take 100 mg by mouth daily.      amLODIPine (NORVASC) 10 MG tablet Take 1 tablet (10 mg) by mouth daily 90 tablet 3    atorvastatin (LIPITOR) 20 MG tablet [ATORVASTATIN (LIPITOR) 20 MG TABLET] Take 20 mg by mouth at bedtime.      carvedilol (COREG) 3.125 MG tablet Take 1 tablet (3.125 mg) by mouth 2 times daily (with meals) 60 tablet 11    fluticasone (FLONASE) 50 MCG/ACT  nasal spray spray 2 sprays into each nostril 2 (two) times a day. 16 g 11    Fluticasone-Umeclidin-Vilant (TRELEGY ELLIPTA) 100-62.5-25 MCG/ACT oral inhaler Inhale 1 puff into the lungs daily 60 each 11    gabapentin (NEURONTIN) 300 MG capsule Take 600 mg by mouth At Bedtime       LASIX 40 MG tablet Take 1 tablet by mouth daily      losartan (COZAAR) 100 MG tablet Take 100 mg by mouth daily      metFORMIN (GLUCOPHAGE XR) 500 MG 24 hr tablet Take 500 mg by mouth 2 times daily (with meals)      methocarbamol (ROBAXIN) 500 MG tablet Take 1 tablet (500 mg) by mouth every 6 hours as needed for muscle spasms (muscle spasm) 40 tablet 0    SYNTHROID 50 MCG tablet Take 1 tablet by mouth daily      zolpidem (AMBIEN) 10 MG tablet Take 1 tablet (10 mg) by mouth nightly as needed for sleep 30 tablet 0       Allergies   Allergen Reactions    Animal Dander Unknown    Penicillins Hives    Sulfa Antibiotics Hives          Lab Results    Chemistry/lipid CBC Cardiac Enzymes/BNP/TSH/INR   Recent Labs   Lab Test 05/28/24  1512   CHOL 166   HDL 59   LDL 74   TRIG 165*     Recent Labs   Lab Test 05/28/24  1512 11/08/23  1215 10/03/22  1127   LDL 74 85 80     Recent Labs   Lab Test 05/28/24  1512   *   POTASSIUM 3.8   CHLORIDE 100   CO2 29   *   BUN 34.2*   CR 1.41*   GFRESTIMATED 37*   MARIO 9.2     Recent Labs   Lab Test 05/28/24  1512 11/08/23  1215 09/11/23  1113   CR 1.41* 1.22* 1.07*     Recent Labs   Lab Test 11/08/23  1215   A1C 6.5*          Recent Labs   Lab Test 05/28/24  1512   WBC 9.4   HGB 12.6   HCT 39.0   MCV 91        Recent Labs   Lab Test 05/28/24  1512 09/11/23  1142 11/09/22  1038   HGB 12.6 13.3 12.1    Recent Labs   Lab Test 01/28/22  2151 01/28/22  1831   TROPONINI 0.03 0.01     Recent Labs   Lab Test 09/11/23  1113 05/19/22  1428 04/26/21  1455 03/24/21  1219 07/05/19  1037   BNP  --  78 48  --  40   NTBNP 302  --   --  113  --      Recent Labs   Lab Test 05/28/24  1512   TSH 1.11     Recent Labs    Lab Test 01/28/22  1831   INR 1.01          This note has been dictated using voice recognition software. Any grammatical, typographical, or context distortions are unintentional and inherent to the software    Lottie Murillo PA-C

## 2024-09-09 ENCOUNTER — OFFICE VISIT (OUTPATIENT)
Dept: CARDIOLOGY | Facility: CLINIC | Age: 84
End: 2024-09-09
Payer: MEDICARE

## 2024-09-09 VITALS
RESPIRATION RATE: 14 BRPM | BODY MASS INDEX: 34.84 KG/M2 | WEIGHT: 203 LBS | DIASTOLIC BLOOD PRESSURE: 55 MMHG | HEART RATE: 55 BPM | SYSTOLIC BLOOD PRESSURE: 138 MMHG

## 2024-09-09 DIAGNOSIS — R06.02 SHORTNESS OF BREATH: ICD-10-CM

## 2024-09-09 DIAGNOSIS — I50.9 CHF (CONGESTIVE HEART FAILURE) (H): ICD-10-CM

## 2024-09-09 DIAGNOSIS — R06.02 SHORTNESS OF BREATH: Primary | ICD-10-CM

## 2024-09-09 DIAGNOSIS — I50.32 CHRONIC DIASTOLIC CONGESTIVE HEART FAILURE (H): ICD-10-CM

## 2024-09-09 DIAGNOSIS — I10 BENIGN ESSENTIAL HYPERTENSION: Primary | ICD-10-CM

## 2024-09-09 DIAGNOSIS — I11.0 LVH (LEFT VENTRICULAR HYPERTROPHY) DUE TO HYPERTENSIVE DISEASE, WITH HEART FAILURE (H): ICD-10-CM

## 2024-09-09 DIAGNOSIS — M79.89 LEG SWELLING: ICD-10-CM

## 2024-09-09 LAB
ANION GAP SERPL CALCULATED.3IONS-SCNC: 13 MMOL/L (ref 7–15)
BUN SERPL-MCNC: 45.5 MG/DL (ref 8–23)
CALCIUM SERPL-MCNC: 8.6 MG/DL (ref 8.8–10.4)
CHLORIDE SERPL-SCNC: 106 MMOL/L (ref 98–107)
CREAT SERPL-MCNC: 1.6 MG/DL (ref 0.51–0.95)
EGFRCR SERPLBLD CKD-EPI 2021: 32 ML/MIN/1.73M2
GLUCOSE SERPL-MCNC: 236 MG/DL (ref 70–99)
HCO3 SERPL-SCNC: 23 MMOL/L (ref 22–29)
NT-PROBNP SERPL-MCNC: 2096 PG/ML (ref 0–1800)
POTASSIUM SERPL-SCNC: 4.3 MMOL/L (ref 3.4–5.3)
SODIUM SERPL-SCNC: 142 MMOL/L (ref 135–145)

## 2024-09-09 PROCEDURE — 83880 ASSAY OF NATRIURETIC PEPTIDE: CPT

## 2024-09-09 PROCEDURE — 80048 BASIC METABOLIC PNL TOTAL CA: CPT

## 2024-09-09 PROCEDURE — 36415 COLL VENOUS BLD VENIPUNCTURE: CPT

## 2024-09-09 PROCEDURE — 99214 OFFICE O/P EST MOD 30 MIN: CPT

## 2024-09-09 NOTE — TELEPHONE ENCOUNTER
Spoke with Katt. She went to the cardiologist today. They feel her issues are not heart related but we can not see chart notes at this time. Cardiology told her to be seen by pulmonary. She is still having shortness of breath. She finished her prednisone but it did not help. Made an appt for her tomorrow with Batsheva Huerta CNP.

## 2024-09-09 NOTE — LETTER
9/9/2024    TONY Lara  [address] [place] [address]    RE: Katt Dsouza       Dear Colleague,     I had the pleasure of seeing Katt Dsouza in the [facility].    HEART CARE ENCOUNTER CONSULTATON NOTE      [facility]  [phone]      Assessment/Recommendations   Assessment:   Shortness of breath with cough/phlegm: x3-4 weeks, some improvement on prednisone short course but no resolution.  More consistent with pulmonary origin.  No evidence of pulmonary vascular congestion on exam, and stable baseline edema although there is evidence of weight gain.  Gain may be due to improved appetite after returning from [place] this spring.  No CAD per CCTA 2019, nonischemic NM stress test with fixed defect 1/2024.  Chronic congestive heart failure, preserved EF: Compensated, although noticing some increased ankle swelling following increase in amlodipine  Moderate LVH, grade 1 diastolic dysfunction   Hypertension: Improved 138/55 on amlodipine 10 mg, losartan 100 mg, carvedilol 3.125 twice daily  - Noted history of reduced energy on hydrochlorothiazide.   Mild aortic stenosis, and moderate regurgitation: Echo 1/2024  T2DM: A1c 5.8       Plan:   Continue current antihypertensive regimen  Continue furosemide 40 mg once daily.  Will repeat BMP/BNP today given shortness of breath and weight gain, abnormalities on CMP 3 months ago performed by PCP.  Reports she may be dehydrated today.  Monitor weight, blood pressure, swelling closely.  Report changes.  He has follow-up with pulmonary clinic in October, encouraged her to reach out after completing prednisone to discuss current symptoms.      Follow up in 6 months with Dr. Holman     History of Present Illness/Subjective    HPI: Katt Dsouza is a 83 year old female with PMHx of HFpEF, HTN, mild aortic stenosis/regurgitation, T2DM presents for Hypertension follow-up. Blood pressure is better 130 systolic at home more regularly on  carvedilol.    Swelling is stable more shortness of breath x3-4 weeks in addition to cough with phlegm. Feels like a tightness in her chest/neck with exertion.  Nothing at rest. Not resolved, slightly improved breathing after prednisone x 5 days. Not lightheadedness or dizzy.  Her weight is up 7-8 pounds in the last 2-3 months cording to clinic measurements.  Says she did not feel well in [place] last winter, and when she returned in the spring her appetite returned and these may reflect increased.  Not take her weight daily but thinks she is staying around 190 pounds at home scale .      She denies lightheadedness, orthopnea, PND, palpitations, and chest pain.      Echocardiogram and NM stress test 1/2024 Results scanned in chart       Physical Examination  Review of Systems   Vitals: /55 (BP Location: Right arm, Patient Position: Sitting, Cuff Size: Adult Large)   Pulse 55   Resp 14   Wt 92.1 kg (203 lb)   BMI 34.84 kg/m    BMI= Body mass index is 34.84 kg/m .  Wt Readings from Last 3 Encounters:   09/09/24 92.1 kg (203 lb)   06/12/24 88.5 kg (195 lb)   05/01/24 81.6 kg (180 lb)           ENT/Mouth: membranes moist, no oral lesions or bleeding gums.      EYES:  no scleral icterus, normal conjunctivae       Chest/Lungs:   lungs are clear to auscultation, no rales or wheezing, , equal chest wall expansion    Cardiovascular:   Regular. Normal first and second heart sounds with 2/6 systolic murmur, no rubs, or gallops; the radial and posterior tibial pulses are intact, Jugular venous pressure normal, minimal edema bilaterally        Extremities: no cyanosis or clubbing   Skin: no xanthelasma, warm.    Neurologic: no tremors     Psychiatric: alert and oriented x3, calm        Please refer above for cardiac ROS details.        Medical History  Surgical History Family History Social History   Past Medical History:   Diagnosis Date     Diabetes (H)      Hypertension      Uncomplicated asthma      Past Surgical  History:   Procedure Laterality Date     BACK SURGERY       LAMINECTOMY LUMBAR ONE LEVEL Bilateral 2022    Procedure: LUMBAR 3 - LUMBAR 4 BILATERAL MEDIAL FACETECTOMY, DECOMPRESSION PARTIAL DISCECTOMY;  Surgeon: Leno Recinos MD;  Location: [facility] Main OR     No family history on file.     Social History     Socioeconomic History     Marital status:      Spouse name: Not on file     Number of children: Not on file     Years of education: Not on file     Highest education level: Not on file   Occupational History     Not on file   Tobacco Use     Smoking status: Former     Current packs/day: 0.00     Types: Cigarettes     Quit date: 1995     Years since quittin.7     Smokeless tobacco: Never   Vaping Use     Vaping status: Never Used   Substance and Sexual Activity     Alcohol use: Yes     Comment: rare     Drug use: Never     Sexual activity: Not on file   Other Topics Concern     Not on file   Social History Narrative     Not on file     Social Determinants of Health     Financial Resource Strain: Not on file   Food Insecurity: Not on file   Transportation Needs: Not on file   Physical Activity: Not on file   Stress: Not on file   Social Connections: Not on file   Interpersonal Safety: Not on file   Housing Stability: Not on file           Medications  Allergies   Current Outpatient Medications   Medication Sig Dispense Refill     acetaminophen (TYLENOL) 325 MG tablet Take 1-2 tablets (325-650 mg) by mouth every 4 hours as needed for mild pain 50 tablet 0     albuterol (PROAIR HFA/PROVENTIL HFA/VENTOLIN HFA) 108 (90 Base) MCG/ACT inhaler Inhale 2 puffs into the lungs every 4 hours as needed for shortness of breath or wheezing 8.5 g 11     allopurinoL (ZYLOPRIM) 100 MG tablet [ALLOPURINOL (ZYLOPRIM) 100 MG TABLET] Take 100 mg by mouth daily.       amLODIPine (NORVASC) 10 MG tablet Take 1 tablet (10 mg) by mouth daily 90 tablet 3     atorvastatin (LIPITOR) 20 MG tablet  [ATORVASTATIN (LIPITOR) 20 MG TABLET] Take 20 mg by mouth at bedtime.       carvedilol (COREG) 3.125 MG tablet Take 1 tablet (3.125 mg) by mouth 2 times daily (with meals) 60 tablet 11     fluticasone (FLONASE) 50 MCG/ACT nasal spray spray 2 sprays into each nostril 2 (two) times a day. 16 g 11     Fluticasone-Umeclidin-Vilant (TRELEGY ELLIPTA) 100-62.5-25 MCG/ACT oral inhaler Inhale 1 puff into the lungs daily 60 each 11     gabapentin (NEURONTIN) 300 MG capsule Take 600 mg by mouth At Bedtime        LASIX 40 MG tablet Take 1 tablet by mouth daily       losartan (COZAAR) 100 MG tablet Take 100 mg by mouth daily       metFORMIN (GLUCOPHAGE XR) 500 MG 24 hr tablet Take 500 mg by mouth 2 times daily (with meals)       methocarbamol (ROBAXIN) 500 MG tablet Take 1 tablet (500 mg) by mouth every 6 hours as needed for muscle spasms (muscle spasm) 40 tablet 0     SYNTHROID 50 MCG tablet Take 1 tablet by mouth daily       zolpidem (AMBIEN) 10 MG tablet Take 1 tablet (10 mg) by mouth nightly as needed for sleep 30 tablet 0       Allergies   Allergen Reactions     Animal Dander Unknown     Penicillins Hives     Sulfa Antibiotics Hives          Lab Results    Chemistry/lipid CBC Cardiac Enzymes/BNP/TSH/INR   Recent Labs   Lab Test 05/28/24  1512   CHOL 166   HDL 59   LDL 74   TRIG 165*     Recent Labs   Lab Test 05/28/24  1512 11/08/23  1215 10/03/22  1127   LDL 74 85 80     Recent Labs   Lab Test 05/28/24  1512   *   POTASSIUM 3.8   CHLORIDE 100   CO2 29   *   BUN 34.2*   CR 1.41*   GFRESTIMATED 37*   MARIO 9.2     Recent Labs   Lab Test 05/28/24  1512 11/08/23  1215 09/11/23  1113   CR 1.41* 1.22* 1.07*     Recent Labs   Lab Test 11/08/23  1215   A1C 6.5*          Recent Labs   Lab Test 05/28/24  1512   WBC 9.4   HGB 12.6   HCT 39.0   MCV 91        Recent Labs   Lab Test 05/28/24  1512 09/11/23  1142 11/09/22  1038   HGB 12.6 13.3 12.1    Recent Labs   Lab Test 01/28/22  2151 01/28/22  1831   TROPONINI 0.03  0.01     Recent Labs   Lab Test 09/11/23  1113 05/19/22  1428 04/26/21  1455 03/24/21  1219 07/05/19  1037   BNP  --  78 48  --  40   NTBNP 302  --   --  113  --      Recent Labs   Lab Test 05/28/24  1512   TSH 1.11     Recent Labs   Lab Test 01/28/22  1831   INR 1.01          This note has been dictated using voice recognition software. Any grammatical, typographical, or context distortions are unintentional and inherent to the software    Lottie Murillo PA-C                                         Thank you for allowing me to participate in the care of your patient.      Sincerely,     Lottie House PA-C     [facility]  cc:   Lottie Murillo PA-C  [address] [place] [address]

## 2024-09-09 NOTE — PATIENT INSTRUCTIONS
It was a pleasure taking part in your care today:    - Discuss effects of prednisone with pulmonary clinic  - Continue current medications  - Follow up in 6 months with Dr. Holman    Please call the [facility] clinic with any questions or concerns at [phone].     Lottie Murillo PA-C

## 2024-09-09 NOTE — TELEPHONE ENCOUNTER
Pt called to update care team. She states that she saw cardio- Dr. Murillo, today 9/9, w/ testing (pt didn't specify - records show labs). She is requesting for today's visit notes and results to be reviewed by care team. Per, Dr. Murillo her symptoms are pulm related and therefore needs pulm f/u. Please advise and call pt back.

## 2024-09-16 ENCOUNTER — LAB (OUTPATIENT)
Dept: CARDIOLOGY | Facility: CLINIC | Age: 84
End: 2024-09-16
Payer: MEDICARE

## 2024-09-16 DIAGNOSIS — M79.89 LEG SWELLING: ICD-10-CM

## 2024-09-16 DIAGNOSIS — I50.9 CHF (CONGESTIVE HEART FAILURE) (H): ICD-10-CM

## 2024-09-16 DIAGNOSIS — I50.32 CHRONIC DIASTOLIC CONGESTIVE HEART FAILURE (H): ICD-10-CM

## 2024-09-16 DIAGNOSIS — R06.02 SHORTNESS OF BREATH: ICD-10-CM

## 2024-09-16 LAB
ANION GAP SERPL CALCULATED.3IONS-SCNC: 13 MMOL/L (ref 7–15)
BUN SERPL-MCNC: 46.9 MG/DL (ref 8–23)
CALCIUM SERPL-MCNC: 8.5 MG/DL (ref 8.8–10.4)
CHLORIDE SERPL-SCNC: 101 MMOL/L (ref 98–107)
CREAT SERPL-MCNC: 1.91 MG/DL (ref 0.51–0.95)
EGFRCR SERPLBLD CKD-EPI 2021: 26 ML/MIN/1.73M2
GLUCOSE SERPL-MCNC: 142 MG/DL (ref 70–99)
HCO3 SERPL-SCNC: 26 MMOL/L (ref 22–29)
POTASSIUM SERPL-SCNC: 3.8 MMOL/L (ref 3.4–5.3)
SODIUM SERPL-SCNC: 140 MMOL/L (ref 135–145)

## 2024-09-16 PROCEDURE — 80048 BASIC METABOLIC PNL TOTAL CA: CPT

## 2024-09-16 PROCEDURE — 36415 COLL VENOUS BLD VENIPUNCTURE: CPT

## 2024-09-19 ENCOUNTER — OFFICE VISIT (OUTPATIENT)
Dept: PODIATRY | Facility: CLINIC | Age: 84
End: 2024-09-19
Payer: MEDICARE

## 2024-09-19 VITALS
DIASTOLIC BLOOD PRESSURE: 81 MMHG | HEART RATE: 55 BPM | OXYGEN SATURATION: 97 % | RESPIRATION RATE: 20 BRPM | SYSTOLIC BLOOD PRESSURE: 156 MMHG

## 2024-09-19 DIAGNOSIS — E11.42 DIABETIC POLYNEUROPATHY ASSOCIATED WITH TYPE 2 DIABETES MELLITUS (H): Primary | ICD-10-CM

## 2024-09-19 PROCEDURE — 99203 OFFICE O/P NEW LOW 30 MIN: CPT | Performed by: PODIATRIST

## 2024-09-19 RX ORDER — GABAPENTIN 300 MG/1
300 CAPSULE ORAL 3 TIMES DAILY
Qty: 90 CAPSULE | Refills: 0 | Status: SHIPPED | OUTPATIENT
Start: 2024-09-19 | End: 2024-09-24

## 2024-09-19 ASSESSMENT — PAIN SCALES - GENERAL: PAINLEVEL: MILD PAIN (3)

## 2024-09-19 NOTE — Clinical Note
9/19/2024      Katt Dsouza  [address]  [facility] [address]      Dear Colleague,    Thank you for referring your patient, Katt Dsouza, to the [facility]. Please see a copy of my visit note below.    No notes on file    Again, thank you for allowing me to participate in the care of your patient.        Sincerely,        Adria Ramírez DPM

## 2024-09-19 NOTE — PROGRESS NOTES
FOOT AND ANKLE SURGERY/PODIATRY CONSULT NOTE         ASSESSMENT:   Diabetic neuropathy bilateral lower extremities      TREATMENT:  I recommended the patient increase her gabapentin from 600 mg at bedtime to 600 mg twice daily.  She is to return to the clinic as needed.        HPI:Katt Dsouza presented to the clinic today complaining of numbness and tingling on the bottom of both feet.  The patient stated that her symptoms also affect both legs.  She has had the symptoms for the past year.  She does currently take 600 mg of gabapentin at bedtime.  This seems to give her some mild relief.  She indicated that she is well-controlled diabetic who has also had vascular surgery to correct incompetent veins.  She has increased swelling as a result of the procedure.  Her Lasix was increased to 80 mg but she could not tolerate 80 mg and she was adjusted down to 40 mg.  Symptoms are slightly aggravated with weightbearing and ambulation.  She does sleep well at night and she attributes this to her gabapentin.  She has not had any open wounds.  She has not had any cellulitis.    Past Medical History:   Diagnosis Date    Diabetes (H)     Hypertension     Uncomplicated asthma        Social History     Socioeconomic History    Marital status:      Spouse name: Not on file    Number of children: Not on file    Years of education: Not on file    Highest education level: Not on file   Occupational History    Not on file   Tobacco Use    Smoking status: Former     Current packs/day: 0.00     Types: Cigarettes     Quit date: 1995     Years since quittin.7    Smokeless tobacco: Never   Vaping Use    Vaping status: Never Used   Substance and Sexual Activity    Alcohol use: Yes     Comment: rare    Drug use: Never    Sexual activity: Not on file   Other Topics Concern    Not on file   Social History Narrative    Not on file     Social Determinants of Health     Financial Resource Strain: Not on file   Food Insecurity: Not  on file   Transportation Needs: Not on file   Physical Activity: Not on file   Stress: Not on file   Social Connections: Not on file   Interpersonal Safety: Not on file   Housing Stability: Not on file          Allergies   Allergen Reactions    Animal Dander Unknown    Penicillins Hives    Sulfa Antibiotics Hives          Current Outpatient Medications:     acetaminophen (TYLENOL) 325 MG tablet, Take 1-2 tablets (325-650 mg) by mouth every 4 hours as needed for mild pain, Disp: 50 tablet, Rfl: 0    albuterol (PROAIR HFA/PROVENTIL HFA/VENTOLIN HFA) 108 (90 Base) MCG/ACT inhaler, Inhale 2 puffs into the lungs every 4 hours as needed for shortness of breath or wheezing, Disp: 8.5 g, Rfl: 11    allopurinoL (ZYLOPRIM) 100 MG tablet, [ALLOPURINOL (ZYLOPRIM) 100 MG TABLET] Take 100 mg by mouth daily., Disp: , Rfl:     amLODIPine (NORVASC) 10 MG tablet, Take 1 tablet (10 mg) by mouth daily, Disp: 90 tablet, Rfl: 3    atorvastatin (LIPITOR) 20 MG tablet, [ATORVASTATIN (LIPITOR) 20 MG TABLET] Take 20 mg by mouth at bedtime., Disp: , Rfl:     carvedilol (COREG) 3.125 MG tablet, Take 1 tablet (3.125 mg) by mouth 2 times daily (with meals), Disp: 60 tablet, Rfl: 11    fluticasone (FLONASE) 50 MCG/ACT nasal spray, spray 2 sprays into each nostril 2 (two) times a day., Disp: 16 g, Rfl: 11    Fluticasone-Umeclidin-Vilant (TRELEGY ELLIPTA) 100-62.5-25 MCG/ACT oral inhaler, Inhale 1 puff into the lungs daily, Disp: 60 each, Rfl: 11    gabapentin (NEURONTIN) 300 MG capsule, Take 600 mg by mouth At Bedtime , Disp: , Rfl:     LASIX 40 MG tablet, Take 1 tablet by mouth daily, Disp: , Rfl:     losartan (COZAAR) 100 MG tablet, Take 100 mg by mouth daily, Disp: , Rfl:     metFORMIN (GLUCOPHAGE XR) 500 MG 24 hr tablet, Take 500 mg by mouth 2 times daily (with meals), Disp: , Rfl:     SYNTHROID 50 MCG tablet, Take 1 tablet by mouth daily, Disp: , Rfl:     zolpidem (AMBIEN) 10 MG tablet, Take 1 tablet (10 mg) by mouth nightly as needed for  sleep, Disp: 30 tablet, Rfl: 0    methocarbamol (ROBAXIN) 500 MG tablet, Take 1 tablet (500 mg) by mouth every 6 hours as needed for muscle spasms (muscle spasm), Disp: 40 tablet, Rfl: 0     No family history on file.     Social History     Socioeconomic History    Marital status:      Spouse name: Not on file    Number of children: Not on file    Years of education: Not on file    Highest education level: Not on file   Occupational History    Not on file   Tobacco Use    Smoking status: Former     Current packs/day: 0.00     Types: Cigarettes     Quit date: 1995     Years since quittin.7    Smokeless tobacco: Never   Vaping Use    Vaping status: Never Used   Substance and Sexual Activity    Alcohol use: Yes     Comment: rare    Drug use: Never    Sexual activity: Not on file   Other Topics Concern    Not on file   Social History Narrative    Not on file     Social Determinants of Health     Financial Resource Strain: Not on file   Food Insecurity: Not on file   Transportation Needs: Not on file   Physical Activity: Not on file   Stress: Not on file   Social Connections: Not on file   Interpersonal Safety: Not on file   Housing Stability: Not on file        Review of Systems - Patient denies fever, chills, rash, wound, stiffness, limping, weakness, heart burn, blood in stool, chest pain with activity, calf pain when walking, shortness of breath with activity, chronic cough, easy bleeding/bruising, swelling of ankles, excessive thirst, fatigue, depression, anxiety.  Patient admits to numbness tingling both feet.      OBJECTIVE:  Appearance: alert, well appearing, and in no distress.    BP (!) 156/81   Pulse 55   Resp 20   SpO2 97%      There is no height or weight on file to calculate BMI.     General appearance: Patient is alert and fully cooperative with history & exam.  No sign of distress is noted during the visit.  Psychiatric: Affect is pleasant & appropriate.  Patient appears motivated to  improve health.  Respiratory: Breathing is regular & unlabored while sitting.  HEENT: Hearing is intact to spoken word.  Speech is clear.  No gross evidence of visual impairment that would impact ambulation.    Vascular: Dorsalis pedis and posterior tibial pulses are palpable. There is no pedal hair growth bilaterally.  CFT < 3 sec from anterior tibial surface to distal digits bilaterally. There is appreciable edema noted both lower extremities.  Dermatologic: Turgor and texture are within normal limits. No coloration or temperature changes. No primary or secondary lesions noted.  Neurologic: All epicritic and proprioceptive sensations are grossly diminished bilaterally.   Musculoskeletal: All active and passive ankle, subtalar, midtarsal, and 1st MPJ range of motion are grossly intact bilaterally .Manual muscle strength is within normal limits bilaterally. All dorsiflexors, plantarflexors, invertors, evertors are intact bilaterally.  No tenderness present to bilateral feet or ankles on palpation.  No tenderness to bilateral feet or ankles with range of motion. Calf is soft/non-tender without warmth/induration    Imaging:       No images are attached to the encounter or orders placed in the encounter.     No results found.       Adria Santos DPM  [facility]iatry

## 2024-09-20 ENCOUNTER — OFFICE VISIT (OUTPATIENT)
Dept: PULMONOLOGY | Facility: CLINIC | Age: 84
End: 2024-09-20
Payer: MEDICARE

## 2024-09-20 VITALS
SYSTOLIC BLOOD PRESSURE: 134 MMHG | DIASTOLIC BLOOD PRESSURE: 88 MMHG | BODY MASS INDEX: 34.33 KG/M2 | OXYGEN SATURATION: 95 % | HEART RATE: 64 BPM | WEIGHT: 200 LBS

## 2024-09-20 DIAGNOSIS — R06.09 DYSPNEA ON EXERTION: ICD-10-CM

## 2024-09-20 DIAGNOSIS — J45.41 MODERATE PERSISTENT ASTHMA WITH EXACERBATION: Primary | ICD-10-CM

## 2024-09-20 PROCEDURE — 99214 OFFICE O/P EST MOD 30 MIN: CPT | Performed by: NURSE PRACTITIONER

## 2024-09-20 RX ORDER — PREDNISONE 20 MG/1
40 TABLET ORAL DAILY
Qty: 10 TABLET | Refills: 0 | Status: ON HOLD | OUTPATIENT
Start: 2024-09-20 | End: 2024-09-25

## 2024-09-20 NOTE — PROGRESS NOTES
Patient oxygen saturation on RA at rest is 95% pulse 64  Oxygen saturation after ambulating 400ft on RA is 95% pulse 80        Patient is ambulatory within his/her home.      Cheryl Sauceda LPN

## 2024-09-20 NOTE — PATIENT INSTRUCTIONS
It was a pleasure seeing you in clinic today. This is what we discussed:    We will repeat the prednisone for now.   Continue the Trelegy for now  Use your albuterol every 4 hours as needed for cough, shortness of breath, wheeze, or chest tightness.   If your oxygen is less than 88% you should go into the emergency room.   Follow up with Emperatriz next week  If you have worsening symptoms, questions, or need to speak with the nurse please call [phone].

## 2024-09-20 NOTE — PROGRESS NOTES
Pulmonary Clinic Follow-Up   September 20, 2024     Assessment/Plan:     Katt Dsouza is a 82 year old former smoker with history of asthma, diastolic heart failure, obesity, presenting today with acute symptoms.  She is reporting worsening shortness of breath with exertion.  Continues to have some cough to clear throat congestion.  Denies wheeze or chest tightness.  She never started using Symbicort and has continued Trelegy.  She did use a prednisone burst at the end of August with minimal improvement.  Had recent follow-up with cardiology.  At that time BNP was elevated so they had her take Lasix twice daily instead of daily.  She reports during that time symptoms resolved and her breathing was great.  No current plans for follow-up, per note 6-month follow-up indicated.      In clinic walk did not demonstrate desaturation with activity.  Lung exam and SpO2 on room air are normal.  No cough during visit.    Moderate persistent asthma  Seasonal allergies  Allergic rhinitis  Spirometry ratio is borderline reduced with reduced FEV1 which may suggest early obstruction, not consistent with reversibility.  Also with air-trapping and diffusion capacity is mildly reduced  ACT is 20 today.   Flow volume loops demonstrate mild scooping of expiratory limb suggestive of obstruction.  No URIs or exacerbations since last visit.  She is getting ready to spend 6 months in [place] and is looking for a cheaper regimen.  Plan:  - Will repeat prednisone burst  - continue Trelegy, 1 puff daily. rinse/gargle after use.    - continue Albuterol inhaler PRN  - she is UTD with COVID vaccines, annual influenza vaccine, and pneumococcal vaccines  - Action plan: prednisone 40mg x7 days.  I gave her script for this today and let her know to contact us if she needs to start.      Follow-up  - Next week with Emperatriz as planned    Venecia Dominguez CNP  Pulmonary Medicine  [facility]   [phone]         CC:     Asthma follow-up     HPI:  "    Katt was last seen in clinic by Emperatriz Huerta CNP on 2023.  She has noticed some increased shortness of breath over the past few months and during a recent follow-up with cardiology they suggested she return to clinic to discuss breathing symptoms.  Reports resolution of wheeze and \"crackles\" after increasing lasix from 40 mg to 80 mg. Unfortunately, her lab work shows elevated Cr so they did not keep this increased dose.   Given prednisone on  with some improvement in symptoms.     Trelegy continues to be quite expensive, she is looking for cheaper alternative.        2021     7:00 AM 2023     9:00 AM 2023    12:03 PM   ACT Total Scores   ACT TOTAL SCORE (Goal Greater than or Equal to 20) 20 15 20   In the past 12 months, how many times did you visit the emergency room for your asthma without being admitted to the hospital? 0 0 0   In the past 12 months, how many times were you hospitalized overnight because of your asthma? 0 0 0      Medical history:     Past Medical History:   Diagnosis Date    Diabetes (H)     Hypertension     Uncomplicated asthma      Past Surgical History:   Procedure Laterality Date    BACK SURGERY      LAMINECTOMY LUMBAR ONE LEVEL Bilateral 2022    Procedure: LUMBAR 3 - LUMBAR 4 BILATERAL MEDIAL FACETECTOMY, DECOMPRESSION PARTIAL DISCECTOMY;  Surgeon: Leno Recinos MD;  Location: [facility] Main OR     Social History     Tobacco Use    Smoking status: Former     Current packs/day: 0.00     Types: Cigarettes     Quit date: 1995     Years since quittin.7    Smokeless tobacco: Never   Vaping Use    Vaping status: Never Used   Substance Use Topics    Alcohol use: Yes     Comment: rare    Drug use: Never     Current Outpatient Medications   Medication Sig Dispense Refill    albuterol (PROAIR HFA/PROVENTIL HFA/VENTOLIN HFA) 108 (90 Base) MCG/ACT inhaler Inhale 2 puffs into the lungs every 4 hours as needed for shortness of breath or " wheezing 8.5 g 11    allopurinoL (ZYLOPRIM) 100 MG tablet [ALLOPURINOL (ZYLOPRIM) 100 MG TABLET] Take 100 mg by mouth daily.      amLODIPine (NORVASC) 10 MG tablet Take 1 tablet (10 mg) by mouth daily 90 tablet 3    atorvastatin (LIPITOR) 20 MG tablet [ATORVASTATIN (LIPITOR) 20 MG TABLET] Take 20 mg by mouth at bedtime.      carvedilol (COREG) 3.125 MG tablet Take 1 tablet (3.125 mg) by mouth 2 times daily (with meals) 60 tablet 11    fluticasone (FLONASE) 50 MCG/ACT nasal spray spray 2 sprays into each nostril 2 (two) times a day. (Patient taking differently: spray 2 sprays into each nostril 2 (two) times a day.as needed) 16 g 11    Fluticasone-Umeclidin-Vilant (TRELEGY ELLIPTA) 100-62.5-25 MCG/ACT oral inhaler Inhale 1 puff into the lungs daily 60 each 11    gabapentin (NEURONTIN) 300 MG capsule Take 1 capsule (300 mg) by mouth 3 times daily. 90 capsule 0    gabapentin (NEURONTIN) 300 MG capsule Take 600 mg by mouth At Bedtime       LASIX 40 MG tablet Take 1 tablet by mouth daily      losartan (COZAAR) 100 MG tablet Take 100 mg by mouth daily      metFORMIN (GLUCOPHAGE XR) 500 MG 24 hr tablet Take 500 mg by mouth 2 times daily (with meals)      SYNTHROID 50 MCG tablet Take 1 tablet by mouth daily      zolpidem (AMBIEN) 10 MG tablet Take 1 tablet (10 mg) by mouth nightly as needed for sleep 30 tablet 0    acetaminophen (TYLENOL) 325 MG tablet Take 1-2 tablets (325-650 mg) by mouth every 4 hours as needed for mild pain (Patient not taking: Reported on 9/20/2024) 50 tablet 0     No current facility-administered medications for this visit.      Physical Exam:     /88   Pulse 64   Wt 90.7 kg (200 lb)   SpO2 95%   BMI 34.33 kg/m      Physical Exam  Constitutional:       General: She is not in acute distress.     Appearance: She is not ill-appearing.   Cardiovascular:      Rate and Rhythm: Normal rate and regular rhythm.      Heart sounds: Murmur heard.   Pulmonary:      Effort: Pulmonary effort is normal. No  respiratory distress.      Breath sounds: No wheezing or rhonchi.   Musculoskeletal:      Right lower leg: No edema.      Left lower leg: No edema.   Neurological:      Mental Status: She is alert.   Psychiatric:         Behavior: Behavior normal.           Data:     EXAM: XR CHEST 2 VW  LOCATION: [facility]  DATE/TIME: 1/28/2022   INDICATION: Shortness of breath  COMPARISON: 5/3/2021                                                 IMPRESSION: No change. Heart size upper limits of normal. Lungs hyperinflated suggesting COPD. No focal pneumonia.      PFTs 2019:  Testing met ATS standards with the exception of lung volumes/washout - did not meet repeatability standards.  FEV1/FVC is 68 and is normal per LLN of 64.  FEV1 is 69% predicted and is reduced.  FVC is 78% predicted and reduced.  There was no improvement in spirometry after a single inhaled does of bronchodilator.  TLC is 108% predicted and is normal.  RV is 145% predicted and is increased.  DLCO is 62% predicted and is reduced when it   is corrected for hemoglobin.  Impression:  Full Pulmonary Function Test is abnormal.    Spirometry ratio is borderline reduced (less than 70 but normal per LLN for age) with reduced FEV1 which may suggest early obstruction. It is not consistent with reversibility.  There is no hyperinflation.  There is air-trapping.  Diffusion capacity is mildly reduced  Flow volume loops demonstrate mild scooping of expiratory limb suggestive of obstruction.

## 2024-09-24 ENCOUNTER — APPOINTMENT (OUTPATIENT)
Dept: RADIOLOGY | Facility: HOSPITAL | Age: 84
End: 2024-09-24
Attending: EMERGENCY MEDICINE
Payer: MEDICARE

## 2024-09-24 ENCOUNTER — APPOINTMENT (OUTPATIENT)
Dept: ULTRASOUND IMAGING | Facility: HOSPITAL | Age: 84
End: 2024-09-24
Attending: INTERNAL MEDICINE
Payer: MEDICARE

## 2024-09-24 ENCOUNTER — NURSE TRIAGE (OUTPATIENT)
Dept: PULMONOLOGY | Facility: CLINIC | Age: 84
End: 2024-09-24
Payer: MEDICARE

## 2024-09-24 ENCOUNTER — HOSPITAL ENCOUNTER (OUTPATIENT)
Facility: HOSPITAL | Age: 84
Setting detail: OBSERVATION
Discharge: HOME | End: 2024-09-25
Attending: EMERGENCY MEDICINE
Payer: MEDICARE

## 2024-09-24 DIAGNOSIS — R06.09 DOE (DYSPNEA ON EXERTION): ICD-10-CM

## 2024-09-24 DIAGNOSIS — I50.33 ACUTE ON CHRONIC HEART FAILURE WITH PRESERVED EJECTION FRACTION (H): Primary | ICD-10-CM

## 2024-09-24 DIAGNOSIS — R07.9 CHEST PAIN, UNSPECIFIED TYPE: ICD-10-CM

## 2024-09-24 LAB
ALBUMIN UR-MCNC: 70 MG/DL
ANION GAP SERPL CALCULATED.3IONS-SCNC: 15 MMOL/L (ref 7–15)
ANION GAP SERPL CALCULATED.3IONS-SCNC: 16 MMOL/L (ref 7–15)
APPEARANCE UR: CLEAR
BASOPHILS # BLD AUTO: 0 10E3/UL (ref 0–0.2)
BASOPHILS NFR BLD AUTO: 0 %
BILIRUB UR QL STRIP: NEGATIVE
BUN SERPL-MCNC: 63.8 MG/DL (ref 8–23)
BUN SERPL-MCNC: 64.4 MG/DL (ref 8–23)
CALCIUM SERPL-MCNC: 8.7 MG/DL (ref 8.8–10.4)
CALCIUM SERPL-MCNC: 9 MG/DL (ref 8.8–10.4)
CHLORIDE SERPL-SCNC: 102 MMOL/L (ref 98–107)
CHLORIDE SERPL-SCNC: 103 MMOL/L (ref 98–107)
COLOR UR AUTO: ABNORMAL
CREAT SERPL-MCNC: 2.05 MG/DL (ref 0.51–0.95)
CREAT SERPL-MCNC: 2.07 MG/DL (ref 0.51–0.95)
EGFRCR SERPLBLD CKD-EPI 2021: 23 ML/MIN/1.73M2
EGFRCR SERPLBLD CKD-EPI 2021: 24 ML/MIN/1.73M2
EOSINOPHIL # BLD AUTO: 0 10E3/UL (ref 0–0.7)
EOSINOPHIL NFR BLD AUTO: 0 %
ERYTHROCYTE [DISTWIDTH] IN BLOOD BY AUTOMATED COUNT: 14.5 % (ref 10–15)
EST. AVERAGE GLUCOSE BLD GHB EST-MCNC: 140 MG/DL
GLUCOSE BLDC GLUCOMTR-MCNC: 227 MG/DL (ref 70–99)
GLUCOSE BLDC GLUCOMTR-MCNC: 263 MG/DL (ref 70–99)
GLUCOSE SERPL-MCNC: 268 MG/DL (ref 70–99)
GLUCOSE SERPL-MCNC: 341 MG/DL (ref 70–99)
GLUCOSE UR STRIP-MCNC: NEGATIVE MG/DL
HBA1C MFR BLD: 6.5 %
HCO3 SERPL-SCNC: 21 MMOL/L (ref 22–29)
HCO3 SERPL-SCNC: 21 MMOL/L (ref 22–29)
HCT VFR BLD AUTO: 36.4 % (ref 35–47)
HGB BLD-MCNC: 11.8 G/DL (ref 11.7–15.7)
HGB UR QL STRIP: NEGATIVE
HOLD SPECIMEN: NORMAL
HYALINE CASTS: 1 /LPF
IMM GRANULOCYTES # BLD: 0.1 10E3/UL
IMM GRANULOCYTES NFR BLD: 1 %
KETONES UR STRIP-MCNC: NEGATIVE MG/DL
LEUKOCYTE ESTERASE UR QL STRIP: ABNORMAL
LYMPHOCYTES # BLD AUTO: 1.2 10E3/UL (ref 0.8–5.3)
LYMPHOCYTES NFR BLD AUTO: 10 %
MCH RBC QN AUTO: 31.1 PG (ref 26.5–33)
MCHC RBC AUTO-ENTMCNC: 32.4 G/DL (ref 31.5–36.5)
MCV RBC AUTO: 96 FL (ref 78–100)
MONOCYTES # BLD AUTO: 0.2 10E3/UL (ref 0–1.3)
MONOCYTES NFR BLD AUTO: 2 %
MUCOUS THREADS #/AREA URNS LPF: PRESENT /LPF
NEUTROPHILS # BLD AUTO: 10.3 10E3/UL (ref 1.6–8.3)
NEUTROPHILS NFR BLD AUTO: 86 %
NITRATE UR QL: NEGATIVE
NRBC # BLD AUTO: 0 10E3/UL
NRBC BLD AUTO-RTO: 0 /100
NT-PROBNP SERPL-MCNC: 8032 PG/ML (ref 0–1800)
PH UR STRIP: 5.5 [PH] (ref 5–7)
PLATELET # BLD AUTO: 324 10E3/UL (ref 150–450)
POTASSIUM SERPL-SCNC: 5.2 MMOL/L (ref 3.4–5.3)
POTASSIUM SERPL-SCNC: 5.3 MMOL/L (ref 3.4–5.3)
RBC # BLD AUTO: 3.79 10E6/UL (ref 3.8–5.2)
RBC URINE: <1 /HPF
SODIUM SERPL-SCNC: 138 MMOL/L (ref 135–145)
SODIUM SERPL-SCNC: 140 MMOL/L (ref 135–145)
SP GR UR STRIP: 1.01 (ref 1–1.03)
SQUAMOUS EPITHELIAL: 1 /HPF
TRANSITIONAL EPI: <1 /HPF
TROPONIN T SERPL HS-MCNC: 24 NG/L
TROPONIN T SERPL HS-MCNC: 29 NG/L
TROPONIN T SERPL HS-MCNC: 31 NG/L
UROBILINOGEN UR STRIP-MCNC: <2 MG/DL
WBC # BLD AUTO: 11.9 10E3/UL (ref 4–11)
WBC URINE: 47 /HPF

## 2024-09-24 PROCEDURE — 84484 ASSAY OF TROPONIN QUANT: CPT

## 2024-09-24 PROCEDURE — 36415 COLL VENOUS BLD VENIPUNCTURE: CPT

## 2024-09-24 PROCEDURE — 250N000012 HC RX MED GY IP 250 OP 636 PS 637

## 2024-09-24 PROCEDURE — 99207 PR APP CREDIT; MD BILLING SHARED VISIT: CPT

## 2024-09-24 PROCEDURE — 93005 ELECTROCARDIOGRAM TRACING: CPT

## 2024-09-24 PROCEDURE — 83880 ASSAY OF NATRIURETIC PEPTIDE: CPT | Performed by: EMERGENCY MEDICINE

## 2024-09-24 PROCEDURE — 80048 BASIC METABOLIC PNL TOTAL CA: CPT | Performed by: EMERGENCY MEDICINE

## 2024-09-24 PROCEDURE — 250N000011 HC RX IP 250 OP 636

## 2024-09-24 PROCEDURE — 96374 THER/PROPH/DIAG INJ IV PUSH: CPT

## 2024-09-24 PROCEDURE — G0378 HOSPITAL OBSERVATION PER HR: HCPCS

## 2024-09-24 PROCEDURE — 85004 AUTOMATED DIFF WBC COUNT: CPT | Performed by: EMERGENCY MEDICINE

## 2024-09-24 PROCEDURE — 71045 X-RAY EXAM CHEST 1 VIEW: CPT

## 2024-09-24 PROCEDURE — 36415 COLL VENOUS BLD VENIPUNCTURE: CPT | Performed by: STUDENT IN AN ORGANIZED HEALTH CARE EDUCATION/TRAINING PROGRAM

## 2024-09-24 PROCEDURE — 82310 ASSAY OF CALCIUM: CPT | Performed by: EMERGENCY MEDICINE

## 2024-09-24 PROCEDURE — 250N000011 HC RX IP 250 OP 636: Performed by: INTERNAL MEDICINE

## 2024-09-24 PROCEDURE — 99291 CRITICAL CARE FIRST HOUR: CPT | Mod: 25

## 2024-09-24 PROCEDURE — 76770 US EXAM ABDO BACK WALL COMP: CPT

## 2024-09-24 PROCEDURE — 87086 URINE CULTURE/COLONY COUNT: CPT | Performed by: NURSE PRACTITIONER

## 2024-09-24 PROCEDURE — 36415 COLL VENOUS BLD VENIPUNCTURE: CPT | Performed by: EMERGENCY MEDICINE

## 2024-09-24 PROCEDURE — 81001 URINALYSIS AUTO W/SCOPE: CPT | Performed by: INTERNAL MEDICINE

## 2024-09-24 PROCEDURE — 83036 HEMOGLOBIN GLYCOSYLATED A1C: CPT

## 2024-09-24 PROCEDURE — 93005 ELECTROCARDIOGRAM TRACING: CPT | Performed by: EMERGENCY MEDICINE

## 2024-09-24 PROCEDURE — 82962 GLUCOSE BLOOD TEST: CPT

## 2024-09-24 PROCEDURE — 250N000013 HC RX MED GY IP 250 OP 250 PS 637

## 2024-09-24 PROCEDURE — 250N000011 HC RX IP 250 OP 636: Performed by: EMERGENCY MEDICINE

## 2024-09-24 PROCEDURE — 80048 BASIC METABOLIC PNL TOTAL CA: CPT

## 2024-09-24 PROCEDURE — 99222 1ST HOSP IP/OBS MODERATE 55: CPT | Mod: FS | Performed by: INTERNAL MEDICINE

## 2024-09-24 PROCEDURE — 84484 ASSAY OF TROPONIN QUANT: CPT | Performed by: EMERGENCY MEDICINE

## 2024-09-24 PROCEDURE — 96375 TX/PRO/DX INJ NEW DRUG ADDON: CPT

## 2024-09-24 PROCEDURE — 93005 ELECTROCARDIOGRAM TRACING: CPT | Performed by: STUDENT IN AN ORGANIZED HEALTH CARE EDUCATION/TRAINING PROGRAM

## 2024-09-24 PROCEDURE — 96372 THER/PROPH/DIAG INJ SC/IM: CPT

## 2024-09-24 RX ORDER — LOSARTAN POTASSIUM 50 MG/1
100 TABLET ORAL DAILY
Status: DISCONTINUED | OUTPATIENT
Start: 2024-09-24 | End: 2024-09-25 | Stop reason: HOSPADM

## 2024-09-24 RX ORDER — DEXTROSE MONOHYDRATE 25 G/50ML
25-50 INJECTION, SOLUTION INTRAVENOUS
Status: DISCONTINUED | OUTPATIENT
Start: 2024-09-24 | End: 2024-09-25 | Stop reason: HOSPADM

## 2024-09-24 RX ORDER — ATORVASTATIN CALCIUM 10 MG/1
20 TABLET, FILM COATED ORAL AT BEDTIME
Status: DISCONTINUED | OUTPATIENT
Start: 2024-09-24 | End: 2024-09-25 | Stop reason: HOSPADM

## 2024-09-24 RX ORDER — HYDRALAZINE HYDROCHLORIDE 20 MG/ML
10 INJECTION INTRAMUSCULAR; INTRAVENOUS EVERY 6 HOURS PRN
Status: DISCONTINUED | OUTPATIENT
Start: 2024-09-24 | End: 2024-09-25 | Stop reason: HOSPADM

## 2024-09-24 RX ORDER — ALBUTEROL SULFATE 90 UG/1
2 AEROSOL, METERED RESPIRATORY (INHALATION) EVERY 4 HOURS PRN
Status: DISCONTINUED | OUTPATIENT
Start: 2024-09-24 | End: 2024-09-25 | Stop reason: HOSPADM

## 2024-09-24 RX ORDER — ALLOPURINOL 100 MG/1
100 TABLET ORAL DAILY
Status: DISCONTINUED | OUTPATIENT
Start: 2024-09-25 | End: 2024-09-25 | Stop reason: HOSPADM

## 2024-09-24 RX ORDER — FLUTICASONE PROPIONATE 50 MCG
2 SPRAY, SUSPENSION (ML) NASAL 2 TIMES DAILY PRN
COMMUNITY

## 2024-09-24 RX ORDER — AMOXICILLIN 250 MG
1 CAPSULE ORAL 2 TIMES DAILY PRN
Status: DISCONTINUED | OUTPATIENT
Start: 2024-09-24 | End: 2024-09-25 | Stop reason: HOSPADM

## 2024-09-24 RX ORDER — AMOXICILLIN 250 MG
2 CAPSULE ORAL 2 TIMES DAILY PRN
Status: DISCONTINUED | OUTPATIENT
Start: 2024-09-24 | End: 2024-09-25 | Stop reason: HOSPADM

## 2024-09-24 RX ORDER — AMLODIPINE BESYLATE 5 MG/1
10 TABLET ORAL AT BEDTIME
Status: DISCONTINUED | OUTPATIENT
Start: 2024-09-24 | End: 2024-09-25 | Stop reason: HOSPADM

## 2024-09-24 RX ORDER — ONDANSETRON 4 MG/1
4 TABLET, ORALLY DISINTEGRATING ORAL EVERY 6 HOURS PRN
Status: DISCONTINUED | OUTPATIENT
Start: 2024-09-24 | End: 2024-09-25 | Stop reason: HOSPADM

## 2024-09-24 RX ORDER — PREDNISONE 20 MG/1
40 TABLET ORAL DAILY
Status: COMPLETED | OUTPATIENT
Start: 2024-09-25 | End: 2024-09-25

## 2024-09-24 RX ORDER — HEPARIN SODIUM 5000 [USP'U]/.5ML
5000 INJECTION, SOLUTION INTRAVENOUS; SUBCUTANEOUS EVERY 8 HOURS
Status: DISCONTINUED | OUTPATIENT
Start: 2024-09-24 | End: 2024-09-25 | Stop reason: HOSPADM

## 2024-09-24 RX ORDER — LEVOTHYROXINE SODIUM 50 UG/1
50 TABLET ORAL DAILY
COMMUNITY

## 2024-09-24 RX ORDER — FLUTICASONE FUROATE AND VILANTEROL 100; 25 UG/1; UG/1
1 POWDER RESPIRATORY (INHALATION) DAILY
Status: DISCONTINUED | OUTPATIENT
Start: 2024-09-25 | End: 2024-09-25 | Stop reason: HOSPADM

## 2024-09-24 RX ORDER — CARVEDILOL 3.12 MG/1
3.12 TABLET ORAL 2 TIMES DAILY WITH MEALS
Status: DISCONTINUED | OUTPATIENT
Start: 2024-09-24 | End: 2024-09-25 | Stop reason: HOSPADM

## 2024-09-24 RX ORDER — FUROSEMIDE 10 MG/ML
20 INJECTION INTRAMUSCULAR; INTRAVENOUS ONCE
Status: COMPLETED | OUTPATIENT
Start: 2024-09-24 | End: 2024-09-24

## 2024-09-24 RX ORDER — FUROSEMIDE 20 MG
40 TABLET ORAL DAILY
Status: DISCONTINUED | OUTPATIENT
Start: 2024-09-24 | End: 2024-09-25 | Stop reason: HOSPADM

## 2024-09-24 RX ORDER — NICOTINE POLACRILEX 4 MG
15-30 LOZENGE BUCCAL
Status: DISCONTINUED | OUTPATIENT
Start: 2024-09-24 | End: 2024-09-25 | Stop reason: HOSPADM

## 2024-09-24 RX ORDER — GABAPENTIN 300 MG/1
600 CAPSULE ORAL 2 TIMES DAILY
Status: DISCONTINUED | OUTPATIENT
Start: 2024-09-24 | End: 2024-09-24

## 2024-09-24 RX ORDER — GUAIFENESIN/DEXTROMETHORPHAN 100-10MG/5
5 SYRUP ORAL
Status: COMPLETED | OUTPATIENT
Start: 2024-09-24 | End: 2024-09-25

## 2024-09-24 RX ORDER — ZOLPIDEM TARTRATE 10 MG/1
10 TABLET ORAL AT BEDTIME
COMMUNITY

## 2024-09-24 RX ORDER — ZOLPIDEM TARTRATE 5 MG/1
10 TABLET ORAL AT BEDTIME
Status: DISCONTINUED | OUTPATIENT
Start: 2024-09-24 | End: 2024-09-25 | Stop reason: HOSPADM

## 2024-09-24 RX ORDER — LEVOTHYROXINE SODIUM 25 UG/1
50 TABLET ORAL DAILY
Status: DISCONTINUED | OUTPATIENT
Start: 2024-09-25 | End: 2024-09-25 | Stop reason: HOSPADM

## 2024-09-24 RX ORDER — GABAPENTIN 300 MG/1
300 CAPSULE ORAL 3 TIMES DAILY
Status: DISCONTINUED | OUTPATIENT
Start: 2024-09-24 | End: 2024-09-25 | Stop reason: HOSPADM

## 2024-09-24 RX ORDER — HYDROXYZINE HYDROCHLORIDE 25 MG/1
25 TABLET, FILM COATED ORAL
Status: COMPLETED | OUTPATIENT
Start: 2024-09-24 | End: 2024-09-25

## 2024-09-24 RX ORDER — ONDANSETRON 2 MG/ML
4 INJECTION INTRAMUSCULAR; INTRAVENOUS EVERY 6 HOURS PRN
Status: DISCONTINUED | OUTPATIENT
Start: 2024-09-24 | End: 2024-09-25 | Stop reason: HOSPADM

## 2024-09-24 RX ADMIN — FUROSEMIDE 20 MG: 10 INJECTION, SOLUTION INTRAMUSCULAR; INTRAVENOUS at 13:23

## 2024-09-24 RX ADMIN — AMLODIPINE BESYLATE 10 MG: 5 TABLET ORAL at 22:52

## 2024-09-24 RX ADMIN — ATORVASTATIN CALCIUM 20 MG: 10 TABLET, FILM COATED ORAL at 22:53

## 2024-09-24 RX ADMIN — HEPARIN SODIUM 5000 UNITS: 10000 INJECTION, SOLUTION INTRAVENOUS; SUBCUTANEOUS at 22:55

## 2024-09-24 RX ADMIN — INSULIN ASPART 1 UNITS: 100 INJECTION, SOLUTION INTRAVENOUS; SUBCUTANEOUS at 18:11

## 2024-09-24 RX ADMIN — HYDRALAZINE HYDROCHLORIDE 10 MG: 20 INJECTION INTRAMUSCULAR; INTRAVENOUS at 20:54

## 2024-09-24 RX ADMIN — ZOLPIDEM TARTRATE 10 MG: 5 TABLET ORAL at 22:53

## 2024-09-24 RX ADMIN — GABAPENTIN 300 MG: 300 CAPSULE ORAL at 20:54

## 2024-09-24 ASSESSMENT — ACTIVITIES OF DAILY LIVING (ADL)
ADLS_ACUITY_SCORE: 40
ADLS_ACUITY_SCORE: 38
ADLS_ACUITY_SCORE: 38
ADLS_ACUITY_SCORE: 40
ADLS_ACUITY_SCORE: 38
ADLS_ACUITY_SCORE: 39
ADLS_ACUITY_SCORE: 39
ADLS_ACUITY_SCORE: 38

## 2024-09-24 ASSESSMENT — ENCOUNTER SYMPTOMS: SHORTNESS OF BREATH: 1

## 2024-09-24 NOTE — PHARMACY-ADMISSION MEDICATION HISTORY
"Pharmacist Admission Medication History    Admission medication history is complete. The information provided in this note is only as accurate as the sources available at the time of the update.    Information Source(s): Patient, Clinic records, and CareEverywhere/SureScripts via in-person    Pertinent Information: patient has home albuterol inhaler in her belongings.    Changes made to PTA medication list:  Added: None  Deleted:   Tylenol PRN (does not take, reports \"does not work\" when she has pain)  Gabapentin (duplicate record)  Changed:   Flonase nasal spray -> BID to BID PRN allergies  Gabapentin 600 mg at bedtime -> 600 mg BID  (Recent dose increase by patient following podiatry appointment on 9/19/24. Appears podiatry wrote for 300 mg TID instead.)  Metformin 500 mg BID w/ meals -> 1000 mg BID w/ meals  (per patient and fill history)  Synthroid 50 mcg daily -> levothyroxine 50 mcg daily  (per fill history, getting generic medication)  Zolpidem 10 mg at bedtime PRN -> at bedtime every night (per patient)    Allergies reviewed with patient and updates made in EHR: yes    Medication History Completed By: Nicollette McMann, RPH 9/24/2024 4:16 PM    PTA Med List   Medication Sig Last Dose    albuterol (PROAIR HFA/PROVENTIL HFA/VENTOLIN HFA) 108 (90 Base) MCG/ACT inhaler Inhale 2 puffs into the lungs every 4 hours as needed for shortness of breath or wheezing 9/24/2024 at AM    allopurinoL (ZYLOPRIM) 100 MG tablet [ALLOPURINOL (ZYLOPRIM) 100 MG TABLET] Take 100 mg by mouth daily. 9/24/2024 at AM    amLODIPine (NORVASC) 10 MG tablet Take 1 tablet (10 mg) by mouth daily 9/23/2024 at HS    atorvastatin (LIPITOR) 20 MG tablet [ATORVASTATIN (LIPITOR) 20 MG TABLET] Take 20 mg by mouth at bedtime. 9/23/2024 at HS    carvedilol (COREG) 3.125 MG tablet Take 1 tablet (3.125 mg) by mouth 2 times daily (with meals) 9/24/2024 at AM    fluticasone (FLONASE) 50 MCG/ACT nasal spray Spray 2 sprays into both nostrils 2 times daily " as needed for rhinitis or allergies. 9/24/2024 at AM    Fluticasone-Umeclidin-Vilant (TRELEGY ELLIPTA) 100-62.5-25 MCG/ACT oral inhaler Inhale 1 puff into the lungs daily 9/24/2024 at AM    gabapentin (NEURONTIN) 300 MG capsule Take 600 mg by mouth 2 times daily. 9/24/2024 at AM    LASIX 40 MG tablet Take 1 tablet by mouth daily 9/24/2024 at AM    levothyroxine (SYNTHROID/LEVOTHROID) 50 MCG tablet Take 50 mcg by mouth daily. 9/24/2024 at AM    losartan (COZAAR) 100 MG tablet Take 100 mg by mouth daily 9/24/2024 at AM    metFORMIN (GLUCOPHAGE XR) 500 MG 24 hr tablet Take 1,000 mg by mouth 2 times daily (with meals). 9/24/2024 at AM    predniSONE (DELTASONE) 20 MG tablet Take 2 tablets (40 mg) by mouth daily. 9/24/2024 at AM (day 4 of 5)    zolpidem (AMBIEN) 10 MG tablet Take 10 mg by mouth at bedtime. 9/23/2024 at HS

## 2024-09-24 NOTE — TELEPHONE ENCOUNTER
Per Batsheva Huerta CNP:  If she did not improve with steroids, and she is up 4 lbs, and her diuretics have been cut in half - then this is most likely cardiac and she needs to call to get into the cardiac rapid access clinic, or present to ED.     Called and informed Katt of these recommendations. She will call the rapid access clinic right away. If they want her to go to the ED, she will do this.

## 2024-09-24 NOTE — ED PROVIDER NOTES
EMERGENCY DEPARTMENT ENCOUNTER      NAME: Katt Dsouza  AGE: 84 year old female  YOB: 1940  MRN: [id number]  EVALUATION DATE & TIME: 2024 12:38 PM    PCP: Heidy Strickland    ED PROVIDER: Vic Mota M.D.      Chief Complaint   Patient presents with    Chest Pain         FINAL IMPRESSION:  1.  Acute intermittent chest pain.  2.  Acute dyspnea.  3.  Renal insufficiency.    ED COURSE & MEDICAL DECISION MAKIN:00 PM I met with the patient to gather history and to perform my initial exam. We discussed plans for the ED course, including diagnostic testing and treatment. PPE worn: cloth mask.  Patient with intermittent chest pain and shortness of breath for the last 3 days.  Episodes occurring 4-5 times a day lasting a half an hour so time resolved with time.  She notes minimal activity or stairs to bring on shortness of breath and chest pain.  History of CHF but no known coronary artery disease history and Tylenol.  3:15 PM.  EKG with bradycardia 54.  No P waves are evident on this EKG compared to previous EKG 2 years ago which was normal sinus rhythm.  CBC negative.  BUN/creatinine elevated 64 and 2.01.  Sugar 341.  Initial troponin 31 and delta troponin 29.  BNP elevated 8032.  Chest x-ray however is clear without evidence for CHF.  Plan admit patient to cardiac telemetry.  Will discuss observation with inpatient with admitting service.  Patient in agreement.  3:35 PM.  Hospitalist in agreement with cardiac telemetry observation admission.    Pertinent Labs & Imaging studies reviewed. (See chart for details)  84 year old female presents to the Emergency Department for evaluation of intermittent chest pain and shortness of breath.    At the conclusion of the encounter I discussed the results of all of the tests and the disposition. The questions were answered. The patient or family acknowledged understanding and was agreeable with the care plan.              Medical Decision Making  Obtained  supplemental history:Supplemental history obtained?: No  Reviewed external records: External records reviewed?: Outpatient Record: Cardiology Visit 9/9/24  Care impacted by chronic illness:Chronic Kidney Disease, Chronic Lung Disease, Chronic Pain, Diabetes, Heart Disease, Hyperlipidemia, and Hypertension  Care significantly affected by social determinants of health:N/A  Did you consider but not order tests?: Work up considered but not performed and documented in chart, if applicable  Did you interpret images independently?: Independent interpretation of ECG and images noted in documentation, when applicable.  Consultation discussion with other provider:Did you involve another provider (consultant, , pharmacy, etc.)?: I discussed the care with another health care provider, see documentation for details.  Anticipate patient may require admission to the hospital for her chest pain and intermittent shortness of breath.  Not Applicable        MEDICATIONS GIVEN IN THE EMERGENCY:  Medications   furosemide (LASIX) injection 20 mg (has no administration in time range)       NEW PRESCRIPTIONS STARTED AT TODAY'S ER VISIT  New Prescriptions    No medications on file          =================================================================    HPI    Patient information was obtained from: Patient, Family    Use of : N/A       Katt Dsouza is a 84 year old female with a pertinent history of HTN, HLD, COPD, CKD3a, DM II, and CHF who presents to this ED via private car with family for evaluation of chest pain.    The patient reports she has been having intermittent episodes of left-sided chest pain for the past 3 days. She typically has 3-4 episodes of chest pain daily. The chest pain is brought on by exertion. The chest pain comes on if she walks about 50 feet or goes up a flight of stairs. Additionally, she reports she has had increased shortness of breath with exertion over the past couple months. Her cardiology  team tried to increase her Lasix dose (from 40 mg to 80 mg), but her kidneys did not tolerate the increased Lasix dose so she was lowered back down to the 40 mg dose. She reports slightly increased leg swelling.    She does not identify any waxing or waning symptoms otherwise, exacerbating or alleviating features, associated symptoms except as mentioned.       REVIEW OF SYSTEMS   Review of Systems   Respiratory:  Positive for shortness of breath.    Cardiovascular:  Positive for chest pain and leg swelling.   All other systems reviewed and are negative.      PAST MEDICAL HISTORY:  Past Medical History:   Diagnosis Date    Diabetes (H)     Hypertension     Uncomplicated asthma        PAST SURGICAL HISTORY:  Past Surgical History:   Procedure Laterality Date    BACK SURGERY      LAMINECTOMY LUMBAR ONE LEVEL Bilateral 11/8/2022    Procedure: LUMBAR 3 - LUMBAR 4 BILATERAL MEDIAL FACETECTOMY, DECOMPRESSION PARTIAL DISCECTOMY;  Surgeon: Leno Recinos MD;  Location: [facility] Main OR           CURRENT MEDICATIONS:    acetaminophen (TYLENOL) 325 MG tablet  albuterol (PROAIR HFA/PROVENTIL HFA/VENTOLIN HFA) 108 (90 Base) MCG/ACT inhaler  allopurinoL (ZYLOPRIM) 100 MG tablet  amLODIPine (NORVASC) 10 MG tablet  atorvastatin (LIPITOR) 20 MG tablet  carvedilol (COREG) 3.125 MG tablet  fluticasone (FLONASE) 50 MCG/ACT nasal spray  Fluticasone-Umeclidin-Vilant (TRELEGY ELLIPTA) 100-62.5-25 MCG/ACT oral inhaler  gabapentin (NEURONTIN) 300 MG capsule  gabapentin (NEURONTIN) 300 MG capsule  LASIX 40 MG tablet  losartan (COZAAR) 100 MG tablet  metFORMIN (GLUCOPHAGE XR) 500 MG 24 hr tablet  predniSONE (DELTASONE) 20 MG tablet  SYNTHROID 50 MCG tablet  zolpidem (AMBIEN) 10 MG tablet        ALLERGIES:  Allergies   Allergen Reactions    Animal Dander Unknown    Penicillins Hives    Sulfa Antibiotics Hives       FAMILY HISTORY:  No family history on file.    SOCIAL HISTORY:   Social History     Socioeconomic History     Marital status:    Tobacco Use    Smoking status: Former     Current packs/day: 0.00     Types: Cigarettes     Quit date: 1995     Years since quittin.7    Smokeless tobacco: Never   Vaping Use    Vaping status: Never Used   Substance and Sexual Activity    Alcohol use: Yes     Comment: rare    Drug use: Never     Rare alcohol.  No drugs or tobacco.  Former smoker.    VITALS:  BP (!) 162/72   Pulse (!) 49   Resp 21   Wt 93.7 kg (206 lb 8 oz)   SpO2 96%   BMI 35.45 kg/m      PHYSICAL EXAM    Vital Signs:  BP (!) 162/72   Pulse (!) 49   Resp 21   Wt 93.7 kg (206 lb 8 oz)   SpO2 96%   BMI 35.45 kg/m    General:  On entering the room she is in no apparent distress.    Neck:  Neck supple with full range of motion and nontender.    Back:  Back and spine are nontender.  No costovertebral angle tenderness.    HEENT:  Oropharynx clear with moist mucous membranes.  HEENT unremarkable.  Unable to assess neck veins given body habitus.  Pulmonary:  Chest clear to auscultation without rhonchi rales or wheezing.    Cardiovascular:  Cardiac regular rate and rhythm without murmurs rubs or gallops.    Abdomen:  Abdomen soft nontender.  There is no rebound or guarding.    Muskuloskeletal:  She moves all 4 without any difficulty and has normal neurovascular exams.  Extremities without clubbing, cyanosis.  3+ ankle edema bilaterally.  Legs and calves are nontender.    Neuro:  She is alert and oriented ×3 and moves all extremities symmetrically.    Psych:  Normal affect.    Skin:  Unremarkable and warm and dry.       LAB:  All pertinent labs reviewed and interpreted.  Labs Ordered and Resulted from Time of ED Arrival to Time of ED Departure   BASIC METABOLIC PANEL - Abnormal       Result Value    Sodium 138      Potassium 5.2      Chloride 102      Carbon Dioxide (CO2) 21 (*)     Anion Gap 15      Urea Nitrogen 63.8 (*)     Creatinine 2.05 (*)     GFR Estimate 24 (*)     Calcium 8.7 (*)     Glucose 341 (*)     TROPONIN T, HIGH SENSITIVITY - Abnormal    Troponin T, High Sensitivity 31 (*)    CBC WITH PLATELETS AND DIFFERENTIAL - Abnormal    WBC Count 11.9 (*)     RBC Count 3.79 (*)     Hemoglobin 11.8      Hematocrit 36.4      MCV 96      MCH 31.1      MCHC 32.4      RDW 14.5      Platelet Count 324      % Neutrophils 86      % Lymphocytes 10      % Monocytes 2      % Eosinophils 0      % Basophils 0      % Immature Granulocytes 1      NRBCs per 100 WBC 0      Absolute Neutrophils 10.3 (*)     Absolute Lymphocytes 1.2      Absolute Monocytes 0.2      Absolute Eosinophils 0.0      Absolute Basophils 0.0      Absolute Immature Granulocytes 0.1      Absolute NRBCs 0.0     NT PROBNP INPATIENT - Abnormal    N terminal Pro BNP Inpatient 8,032 (*)    TROPONIN T, HIGH SENSITIVITY - Abnormal    Troponin T, High Sensitivity 29 (*)        RADIOLOGY:  Reviewed all pertinent imaging. Please see official radiology report.  XR Chest Port 1 View   Final Result   IMPRESSION: Lungs are clear. Heart and pulmonary vascularity are normal given projection. No signs of failure.      Interval placement of mid thoracic epidural leads.                 EKG:    Bradycardia 54, wide-complex QRS with no P waves seen.  Left anterior fascicular block, right bundle branch block.  Compared to EKG of January 28, 2022 that 1 showed normal sinus rhythm at 79.    I have independently reviewed and interpreted the EKG(s) documented above.    PROCEDURES:         I, Inder Mccormick, am serving as a scribe to document services personally performed by Dr. Mota based on my observation and the provider's statements to me. I, Vic Mota MD attest that Inder Mccormick is acting in a scribe capacity, has observed my performance of the services and has documented them in accordance with my direction.    Vic Mota M.D.  Emergency Medicine  [facility] EMERGENCY DEPARTMENT  [address]  [address]  [phone]  Dept: [phone]     Vic Mota MD  09/24/24 1517       Vic Mota MD  09/24/24 153

## 2024-09-24 NOTE — ED TRIAGE NOTES
Left chest pain that radiates to the neck and left arm with sob and swelling to feet. Hx of CHF   Triage Assessment (Adult)       Row Name 09/24/24 1216          Triage Assessment    Airway WDL WDL        Respiratory WDL    Respiratory WDL X  sob and tacypnea        Cardiac WDL    Cardiac WDL chest pain        Chest Pain Assessment    Chest Pain Location anterior chest, left     Chest Pain Radiation shoulder

## 2024-09-24 NOTE — ED NOTES
Patient's O2 sats at 94-95% on RA.  Order per provider to place on NC.  Placed on 2L for comfort.

## 2024-09-24 NOTE — PROGRESS NOTES
Patient admitted to room 10 at approximately 1659 via wheel chair from emergency room.  Reason for Admission:   Report received from:   Patient was accompanied by Son.  Discharge transportation provided by:  Patient ambulated/transferred:  with stand-by assist. self.  Patient is alert and orientated x 4.  Outpatient Observation education provided to: (patient, family, friend)  MDRO Education done if applicable (MRSA, VRE, etc)  Safety risks were identified during admission:  none.   Yellow risk/fall band applied:  Yes  Home meds sent home: No  Home meds sent to pharmacy:No IF YES add 1/2 sheet laminated page reminder to chart/clipboard   Detailed Belongings:

## 2024-09-24 NOTE — TELEPHONE ENCOUNTER
Nurse Triage SBAR    Is this a 2nd Level Triage? YES, LICENSED PRACTITIONER REVIEW IS REQUIRED    Situation:    Patient calling with shortness of breath which is unchanged form 9/20/24 when she was started on Prednisone 40 MG daily x 5 days.   -She is trying to get in earlier to see Pulmonology and was transferred to Red flag triage-- she does not wish to go to Ed and is checking dialy on availibility.    Per chart review: last seen 9/20/24 PULM( [facility])  Cardiology ( [facility])9/9/24  Has appts with both services on 9/26/24  LVD: history of asthma, diastolic heart failure, obesity   She was started on Prednisone 40 MG x 5 days on 9/20/24 PULM  Currently: oximeter 96%, low 50's for pulse, notes ankle edema and weight gain   Baseline weight 200, today 204 lbs.  Fever: negative  Cough: positive.   Air hunger- speaks in full phrases but notes SOB is helped best by Albuterol, which she took twice on 9/23. She will up this to every 4 prn. She took her Trelegy this AM.  She reports that when Lasix was increased to 80 mg daily, her kidney function was adversely affected. Currently on Lasix 40 mg daily, amlodipine 10 mg QD, Carvedilol 3.125 BID, Losartan 100 mg daily.  Cardiology increased her Lasix from 40 MG to 80 MG   Last Comprehensive Metabolic Panel:  Lab Results   Component Value Date     09/16/2024    POTASSIUM 3.8 09/16/2024    CHLORIDE 101 09/16/2024    CO2 26 09/16/2024    ANIONGAP 13 09/16/2024     (H) 09/16/2024    BUN 46.9 (H) 09/16/2024    CR 1.91 (H) 09/16/2024    GFRESTIMATED 26 (L) 09/16/2024    MARIO 8.5 (L) 09/16/2024           Trelegy and albuterol can use every 4 hours- used twice yesterday  No supplemental oxygen .      Background: as noted    Assessment: Patient calling as no improvement from SOB visit 9/20/24 and Prednisone burst x 5 days ( 40 MG daily)  Notes 4 lb weight gain. No chest pain. Does have asthma and CHF. Using Trelegy and abluterol- albuterol seems most  helpful    Protocol/ recommendation:See in office today. Worsening symptoms> ED. She is aware and feels stable- just not improved. Nathanael appts in cards/ Pulm on 9/26.       Routed to provider    Does the patient meet one of the following criteria for ADS visit consideration? No    Reason for Disposition   Longstanding difficulty breathing (e.g., CHF, COPD, emphysema) and worse than normal   Longstanding difficulty breathing and not responding to usual therapy    Additional Information   Negative: SEVERE difficulty breathing (e.g., struggling for each breath, speaks in single words, pulse > 120)   Negative: Breathing stopped and hasn't returned   Negative: Choking on something   Negative: Bluish (or gray) lips or face   Negative: Difficult to awaken or acting confused (e.g., disoriented, slurred speech)   Negative: Passed out (i.e., fainted, collapsed and was not responding)   Negative: Wheezing started suddenly after medicine, an allergic food, or bee sting   Negative: Stridor (harsh sound while breathing in)   Negative: Slow, shallow and weak breathing   Negative: Sounds like a life-threatening emergency to the triager   Negative: Chest pain   Negative: Wheezing (high pitched whistling sound) and previous asthma attacks or use of asthma medicines   Negative: Difficulty breathing and within 14 days of COVID-19 Exposure   Negative: Difficulty breathing and only present when coughing   Negative: Difficulty breathing and only from stuffy nose   Negative: Difficulty breathing and only from stuffy nose or runny nose from common cold   Negative: MODERATE difficulty breathing (e.g., speaks in phrases, SOB even at rest, pulse 100-120) of new-onset or worse than normal   Negative: Oxygen level (e.g., pulse oximetry) 90% or lower   Negative: Wheezing can be heard across the room   Negative: Drooling or spitting out saliva (because can't swallow)   Negative: Cancer treatment in past six months (or has cancer now)   Negative:  Extra heartbeats, irregular heart beating, or heart is beating very fast (i.e., 'palpitations')   Negative: Fever > 103 F (39.4 C)   Negative: Fever > 101 F (38.3 C) and over 60 years of age   Negative: Fever > 100.0 F (37.8 C) and bedridden (e.g., nursing home patient, stroke, chronic illness, recovering from surgery)   Negative: Fever > 100.0 F (37.8 C) and diabetes mellitus or weak immune system (e.g., HIV positive, cancer chemo, splenectomy, organ transplant, chronic steroids)   Negative: Periods where breathing stops and then resumes normally and bedridden (e.g., nursing home patient, CVA)    Protocols used: Breathing Difficulty-A-OH

## 2024-09-24 NOTE — PROGRESS NOTES
"PRIMARY DIAGNOSIS: \"GENERIC\" NURSING  OUTPATIENT/OBSERVATION GOALS TO BE MET BEFORE DISCHARGE:  ADLs back to baseline: Yes    Activity and level of assistance: Up with standby assistance.    Pain status: Pain free.    Return to near baseline physical activity: Yes     Discharge Planner Nurse   Safe discharge environment identified: No  Barriers to discharge: Yes       Entered by: Michell Luke RN 09/24/2024 6:41 PM  Pt A&O x 4, denies pain, fluid restriction 2000 ml, Blood sugar 227, Tele: Sinus eric, Mag/K protocol with AM lab draw, await U/A results, Renal U/S later tonight   Please review provider order for any additional goals.   Nurse to notify provider when observation goals have been met and patient is ready for discharge.  "

## 2024-09-24 NOTE — H&P
[facility]    History and Physical - Hospitalist Service       Date of Admission:  9/24/2024    Assessment & Plan      Katt Dsouza is a 84 year old female with PMH CHFpEF, asthma,.  DM type II, diabetic neuropathy, gout, hyperlipidemia, hypothyroidism, and hypertension is admitted on 9/24/2024 with chest pressure and shortness of breath likely 2/2 CHF exacerbation.    Chest pressure radiating to left neck and arm  -Troponin-31--> 29  -EKG-sinus bradycardia.  No signs of ischemia  -Chest x-ray-lungs are clear.  Heart and pulmonary vascularity are normal.  No signs of failure.  -Cardiology consult    Shortness of breath   Acute on chronic CHF exacerbation  -BNP-8,032  -Chest x-ray-lungs are clear.  Heart and pulmonary vascularity are normal.  No signs of failure.  -IV Lasix 20 mg x 1 in ER  -Echocardiogram  -Cardiology consult-diuretic recommendations appreciated  -PTA Lasix on hold he does get out of due to [place]  -Has 1 day left of prednisone prescribed to her for shortness of breath    [place]  -Creatinine-2.05  -Hold PTA lasix and losartan  -Trend BMP  -Nephrology consult  -UA  -Renal ultrasound    Bradycardia  -Asymptomatic  -EKG-sinus bradycardia  -Heart rate 40's  -Hold PTA carvedilol    DM type II  -Hemoglobin A1c-6.5%  -Hold PTA Metformin  -Accu-Cheks and sliding scale insulin    Diabetic neuropathy  -PTA gabapentin.  Dose reduced due to [place]    Hypertension  -PTA amlodipine-as needed hydralazine    Hyperlipidemia  -PTA atorvastatin    Gout  -PTA allopurinol    Hypothyroidism  -PTA levothyroxine    Asthma  -PTA inhalers     Observation Goals: -diagnostic tests and consults completed and resulted, -vital signs normal or at patient baseline, -dyspnea improved and O2 sats greater than 88% on room air or prior home oxygen levels, Nurse to notify provider when observation goals have been met and patient is ready for discharge.  Diet: Combination Diet 2 gm NA Diet; No Caffeine Diet (and  "additional linked orders)  Fluid restriction 2000 ML FLUID (and additional linked orders)    DVT Prophylaxis: Heparin SQ  Gil Catheter: Not present  Lines: None     Cardiac Monitoring: ACTIVE order. Indication: Acute decompensated heart failure (48 hours)  Code Status: Full Code      Clinically Significant Risk Factors Present on Admission                  # Hypertension: Noted on problem list        # DMII: A1C = 6.5 % (Ref range: <5.7 %) within past 6 months        # Asthma: noted on problem list        Disposition Plan     Medically Ready for Discharge: Anticipated Tomorrow         The patient's care was discussed with the Attending Physician, Dr. Payton, Patient, and Patient's Family.    Catia Edward NP  Hospitalist Service  [facility]  Securely message with [employer] (more info)  Text page via [employer] Paging/Directory     ______________________________________________________________________    Chief Complaint   Chest pain and shortness of breath    History is obtained from the patient    History of Present Illness   Katt Dsouza is a 84 year old female who presented to the ER for evaluation of chest pain and shortness of breath.  Patient describes chest pain as feeling like pressure that radiates to the left side of her neck and down her left arm.  This has been for the past 2 to 3 days.  Patient states she has been short of breath for about a month but worse over the last 2 to 3 days.  Shortness of breath is worse with exertion.  Positive for cough.  Patient describes \"green phlegm\".  Patient denies any associated diaphoresis, nausea or vomiting, dizziness, or shortness of breath.  Patient states she has gained 7 pounds in the past week and has bilateral lower extremity edema.  Patient denies any recent change in diet or activity.  Reports being compliant with all medications.  Patient denies fevers, chills, constipation, diarrhea, dysuria.      Past Medical History    Past " Medical History:   Diagnosis Date    Diabetes (H)     Hypertension     Uncomplicated asthma        Past Surgical History   Past Surgical History:   Procedure Laterality Date    BACK SURGERY      LAMINECTOMY LUMBAR ONE LEVEL Bilateral 11/8/2022    Procedure: LUMBAR 3 - LUMBAR 4 BILATERAL MEDIAL FACETECTOMY, DECOMPRESSION PARTIAL DISCECTOMY;  Surgeon: Leno Recinos MD;  Location: [facility] Main OR       Prior to Admission Medications   Prior to Admission Medications   Prescriptions Last Dose Informant Patient Reported? Taking?   Fluticasone-Umeclidin-Vilant (TRELEGY ELLIPTA) 100-62.5-25 MCG/ACT oral inhaler 9/24/2024 at AM Self No Yes   Sig: Inhale 1 puff into the lungs daily   LASIX 40 MG tablet 9/24/2024 at AM Self Yes Yes   Sig: Take 1 tablet by mouth daily   albuterol (PROAIR HFA/PROVENTIL HFA/VENTOLIN HFA) 108 (90 Base) MCG/ACT inhaler 9/24/2024 at AM Self No Yes   Sig: Inhale 2 puffs into the lungs every 4 hours as needed for shortness of breath or wheezing   allopurinoL (ZYLOPRIM) 100 MG tablet 9/24/2024 at AM Self Yes Yes   Sig: [ALLOPURINOL (ZYLOPRIM) 100 MG TABLET] Take 100 mg by mouth daily.   amLODIPine (NORVASC) 10 MG tablet 9/23/2024 at HS Self No Yes   Sig: Take 1 tablet (10 mg) by mouth daily   atorvastatin (LIPITOR) 20 MG tablet 9/23/2024 at HS Self Yes Yes   Sig: [ATORVASTATIN (LIPITOR) 20 MG TABLET] Take 20 mg by mouth at bedtime.   carvedilol (COREG) 3.125 MG tablet 9/24/2024 at AM Self No Yes   Sig: Take 1 tablet (3.125 mg) by mouth 2 times daily (with meals)   fluticasone (FLONASE) 50 MCG/ACT nasal spray 9/24/2024 at AM Self Yes Yes   Sig: Spray 2 sprays into both nostrils 2 times daily as needed for rhinitis or allergies.   gabapentin (NEURONTIN) 300 MG capsule 9/24/2024 at AM Self Yes Yes   Sig: Take 600 mg by mouth 2 times daily.   levothyroxine (SYNTHROID/LEVOTHROID) 50 MCG tablet 9/24/2024 at AM Self Yes Yes   Sig: Take 50 mcg by mouth daily.   losartan (COZAAR) 100 MG  tablet 9/24/2024 at AM Self Yes Yes   Sig: Take 100 mg by mouth daily   metFORMIN (GLUCOPHAGE XR) 500 MG 24 hr tablet 9/24/2024 at AM Self Yes Yes   Sig: Take 1,000 mg by mouth 2 times daily (with meals).   predniSONE (DELTASONE) 20 MG tablet 9/24/2024 at AM (day 4 of 5) Self No Yes   Sig: Take 2 tablets (40 mg) by mouth daily.   zolpidem (AMBIEN) 10 MG tablet 9/23/2024 at HS Self Yes Yes   Sig: Take 10 mg by mouth at bedtime.      Facility-Administered Medications: None        Review of Systems    The 10 point Review of Systems is negative other than noted in the HPI or here.      Physical Exam   Vital Signs: Temp: 97.7  F (36.5  C) Temp src: Oral BP: (!) 168/74 Pulse: (!) 43   Resp: 18 SpO2: 98 %      Weight: 206 lbs 8 oz    Constitutional: awake, alert, cooperative, no apparent distress, and appears stated age  Respiratory: No increased work of breathing, good air exchange, clear to auscultation bilaterally, no crackles or wheezing  Cardiovascular: Normal apical impulse, regular rate and rhythm, normal S1 and S2, no S3 or S4, and murmur noted  GI: No scars, normal bowel sounds, soft, non-distended, non-tender, no masses palpated, no hepatosplenomegally    Medical Decision Making       75 MINUTES SPENT BY ME on the date of service doing chart review, history, exam, documentation & further activities per the note.      Data     I have personally reviewed the following data over the past 24 hrs:    11.9 (H)  \   11.8   / 324     138 102 63.8 (H) /  341 (H)   5.2 21 (L) 2.05 (H) \     Trop: 29 (H) BNP: 8,032 (H)     TSH: N/A T4: N/A A1C: 6.5 (H)       Imaging results reviewed over the past 24 hrs:   Recent Results (from the past 24 hour(s))   XR Chest Port 1 View    Narrative    EXAM: XR CHEST PORT 1 VIEW  LOCATION: [facility]  DATE: 9/24/2024    INDICATION: chest pain  COMPARISON: 9/11/2023 and older studies      Impression    IMPRESSION: Lungs are clear. Heart and pulmonary vascularity are  normal given projection. No signs of failure.    Interval placement of mid thoracic epidural leads.

## 2024-09-25 ENCOUNTER — APPOINTMENT (OUTPATIENT)
Dept: CARDIOLOGY | Facility: HOSPITAL | Age: 84
End: 2024-09-25
Attending: INTERNAL MEDICINE
Payer: MEDICARE

## 2024-09-25 ENCOUNTER — TELEPHONE (OUTPATIENT)
Dept: CARDIOLOGY | Facility: CLINIC | Age: 84
End: 2024-09-25
Payer: MEDICARE

## 2024-09-25 VITALS
OXYGEN SATURATION: 96 % | WEIGHT: 206.5 LBS | HEART RATE: 70 BPM | DIASTOLIC BLOOD PRESSURE: 72 MMHG | BODY MASS INDEX: 35.45 KG/M2 | TEMPERATURE: 98.1 F | RESPIRATION RATE: 16 BRPM | SYSTOLIC BLOOD PRESSURE: 166 MMHG

## 2024-09-25 DIAGNOSIS — I50.9 ACUTE DECOMPENSATED HEART FAILURE (H): Primary | ICD-10-CM

## 2024-09-25 PROBLEM — I50.33 ACUTE ON CHRONIC HEART FAILURE WITH PRESERVED EJECTION FRACTION (H): Status: ACTIVE | Noted: 2024-09-25

## 2024-09-25 LAB
ABO/RH(D): NORMAL
ANION GAP SERPL CALCULATED.3IONS-SCNC: 12 MMOL/L (ref 7–15)
ANTIBODY SCREEN: NEGATIVE
ATRIAL RATE - MUSE: 73 BPM
BASE EXCESS BLDV CALC-SCNC: 2 MMOL/L (ref -3–3)
BUN SERPL-MCNC: 56.3 MG/DL (ref 8–23)
CALCIUM SERPL-MCNC: 8.8 MG/DL (ref 8.8–10.4)
CHLORIDE SERPL-SCNC: 104 MMOL/L (ref 98–107)
CREAT SERPL-MCNC: 1.74 MG/DL (ref 0.51–0.95)
DIASTOLIC BLOOD PRESSURE - MUSE: NORMAL MMHG
EGFRCR SERPLBLD CKD-EPI 2021: 29 ML/MIN/1.73M2
ERYTHROCYTE [DISTWIDTH] IN BLOOD BY AUTOMATED COUNT: 14 % (ref 10–15)
GLUCOSE BLDC GLUCOMTR-MCNC: 127 MG/DL (ref 70–99)
GLUCOSE BLDC GLUCOMTR-MCNC: 170 MG/DL (ref 70–99)
GLUCOSE BLDC GLUCOMTR-MCNC: 205 MG/DL (ref 70–99)
GLUCOSE BLDC GLUCOMTR-MCNC: 258 MG/DL (ref 70–99)
GLUCOSE BLDC GLUCOMTR-MCNC: 425 MG/DL (ref 70–99)
GLUCOSE SERPL-MCNC: 129 MG/DL (ref 70–99)
HCO3 BLDV-SCNC: 26 MMOL/L (ref 21–28)
HCO3 SERPL-SCNC: 25 MMOL/L (ref 22–29)
HCT VFR BLD AUTO: 35.1 % (ref 35–47)
HGB BLD-MCNC: 11.6 G/DL (ref 11.7–15.7)
INTERPRETATION ECG - MUSE: NORMAL
LVEF ECHO: NORMAL
MCH RBC QN AUTO: 31.6 PG (ref 26.5–33)
MCHC RBC AUTO-ENTMCNC: 33 G/DL (ref 31.5–36.5)
MCV RBC AUTO: 96 FL (ref 78–100)
OXYHGB MFR BLDV: 71 % (ref 70–75)
P AXIS - MUSE: 25 DEGREES
PCO2 BLDV: 37 MM HG (ref 40–50)
PH BLDV: 7.45 [PH] (ref 7.32–7.43)
PLATELET # BLD AUTO: 289 10E3/UL (ref 150–450)
PO2 BLDV: 38 MM HG (ref 25–47)
POTASSIUM SERPL-SCNC: 4.1 MMOL/L (ref 3.4–5.3)
PR INTERVAL - MUSE: 202 MS
QRS DURATION - MUSE: 138 MS
QT - MUSE: 444 MS
QTC - MUSE: 489 MS
R AXIS - MUSE: -48 DEGREES
RBC # BLD AUTO: 3.67 10E6/UL (ref 3.8–5.2)
SAO2 % BLDV: 72 % (ref 70–75)
SODIUM SERPL-SCNC: 141 MMOL/L (ref 135–145)
SPECIMEN EXPIRATION DATE: NORMAL
SYSTOLIC BLOOD PRESSURE - MUSE: NORMAL MMHG
T AXIS - MUSE: 28 DEGREES
VENTRICULAR RATE- MUSE: 73 BPM
WBC # BLD AUTO: 10.9 10E3/UL (ref 4–11)

## 2024-09-25 PROCEDURE — 250N000011 HC RX IP 250 OP 636

## 2024-09-25 PROCEDURE — 250N000013 HC RX MED GY IP 250 OP 250 PS 637

## 2024-09-25 PROCEDURE — G0378 HOSPITAL OBSERVATION PER HR: HCPCS

## 2024-09-25 PROCEDURE — 93010 ELECTROCARDIOGRAM REPORT: CPT | Mod: RTG | Performed by: STUDENT IN AN ORGANIZED HEALTH CARE EDUCATION/TRAINING PROGRAM

## 2024-09-25 PROCEDURE — 93306 TTE W/DOPPLER COMPLETE: CPT | Mod: 26 | Performed by: INTERNAL MEDICINE

## 2024-09-25 PROCEDURE — 99215 OFFICE O/P EST HI 40 MIN: CPT | Performed by: NURSE PRACTITIONER

## 2024-09-25 PROCEDURE — C1751 CATH, INF, PER/CENT/MIDLINE: HCPCS | Performed by: INTERNAL MEDICINE

## 2024-09-25 PROCEDURE — 86901 BLOOD TYPING SEROLOGIC RH(D): CPT | Performed by: GENERAL ACUTE CARE HOSPITAL

## 2024-09-25 PROCEDURE — 82805 BLOOD GASES W/O2 SATURATION: CPT

## 2024-09-25 PROCEDURE — 99207 PR NO BILLABLE SERVICE THIS VISIT: CPT

## 2024-09-25 PROCEDURE — C1769 GUIDE WIRE: HCPCS | Performed by: INTERNAL MEDICINE

## 2024-09-25 PROCEDURE — 86900 BLOOD TYPING SEROLOGIC ABO: CPT | Performed by: GENERAL ACUTE CARE HOSPITAL

## 2024-09-25 PROCEDURE — 250N000013 HC RX MED GY IP 250 OP 250 PS 637: Performed by: GENERAL ACUTE CARE HOSPITAL

## 2024-09-25 PROCEDURE — 93451 RIGHT HEART CATH: CPT | Mod: 26 | Performed by: INTERNAL MEDICINE

## 2024-09-25 PROCEDURE — 272N000001 HC OR GENERAL SUPPLY STERILE: Performed by: INTERNAL MEDICINE

## 2024-09-25 PROCEDURE — C1894 INTRO/SHEATH, NON-LASER: HCPCS | Performed by: INTERNAL MEDICINE

## 2024-09-25 PROCEDURE — 36415 COLL VENOUS BLD VENIPUNCTURE: CPT

## 2024-09-25 PROCEDURE — 99223 1ST HOSP IP/OBS HIGH 75: CPT | Performed by: GENERAL ACUTE CARE HOSPITAL

## 2024-09-25 PROCEDURE — 250N000013 HC RX MED GY IP 250 OP 250 PS 637: Performed by: HOSPITALIST

## 2024-09-25 PROCEDURE — 93451 RIGHT HEART CATH: CPT | Performed by: INTERNAL MEDICINE

## 2024-09-25 PROCEDURE — 250N000012 HC RX MED GY IP 250 OP 636 PS 637

## 2024-09-25 PROCEDURE — 82962 GLUCOSE BLOOD TEST: CPT

## 2024-09-25 PROCEDURE — 250N000009 HC RX 250: Performed by: INTERNAL MEDICINE

## 2024-09-25 PROCEDURE — 93306 TTE W/DOPPLER COMPLETE: CPT

## 2024-09-25 PROCEDURE — 99239 HOSP IP/OBS DSCHRG MGMT >30: CPT | Performed by: EMERGENCY MEDICINE

## 2024-09-25 PROCEDURE — 85027 COMPLETE CBC AUTOMATED: CPT

## 2024-09-25 PROCEDURE — 80048 BASIC METABOLIC PNL TOTAL CA: CPT

## 2024-09-25 PROCEDURE — 96372 THER/PROPH/DIAG INJ SC/IM: CPT

## 2024-09-25 RX ORDER — ASPIRIN 325 MG
325 TABLET ORAL ONCE
Status: COMPLETED | OUTPATIENT
Start: 2024-09-25 | End: 2024-09-25

## 2024-09-25 RX ORDER — FLUTICASONE PROPIONATE 50 MCG
2 SPRAY, SUSPENSION (ML) NASAL 2 TIMES DAILY PRN
Status: DISCONTINUED | OUTPATIENT
Start: 2024-09-25 | End: 2024-09-25 | Stop reason: HOSPADM

## 2024-09-25 RX ORDER — LIDOCAINE 40 MG/G
CREAM TOPICAL
Status: DISCONTINUED | OUTPATIENT
Start: 2024-09-25 | End: 2024-09-25 | Stop reason: HOSPADM

## 2024-09-25 RX ADMIN — HYDROXYZINE HYDROCHLORIDE 25 MG: 25 TABLET, FILM COATED ORAL at 01:24

## 2024-09-25 RX ADMIN — ALBUTEROL SULFATE 2 PUFF: 90 INHALANT RESPIRATORY (INHALATION) at 01:40

## 2024-09-25 RX ADMIN — UMECLIDINIUM 1 PUFF: 62.5 AEROSOL, POWDER ORAL at 09:23

## 2024-09-25 RX ADMIN — FLUTICASONE PROPIONATE 2 SPRAY: 50 SPRAY, METERED NASAL at 03:06

## 2024-09-25 RX ADMIN — INSULIN ASPART 3 UNITS: 100 INJECTION, SOLUTION INTRAVENOUS; SUBCUTANEOUS at 16:53

## 2024-09-25 RX ADMIN — LEVOTHYROXINE SODIUM 50 MCG: 0.03 TABLET ORAL at 09:21

## 2024-09-25 RX ADMIN — ASPIRIN 325 MG ORAL TABLET 325 MG: 325 PILL ORAL at 13:10

## 2024-09-25 RX ADMIN — FLUTICASONE FUROATE AND VILANTEROL TRIFENATATE 1 PUFF: 100; 25 POWDER RESPIRATORY (INHALATION) at 09:22

## 2024-09-25 RX ADMIN — PREDNISONE 40 MG: 20 TABLET ORAL at 09:22

## 2024-09-25 RX ADMIN — FLUTICASONE PROPIONATE 2 SPRAY: 50 SPRAY, METERED NASAL at 09:23

## 2024-09-25 RX ADMIN — GABAPENTIN 300 MG: 300 CAPSULE ORAL at 09:21

## 2024-09-25 RX ADMIN — ALLOPURINOL 100 MG: 100 TABLET ORAL at 09:23

## 2024-09-25 RX ADMIN — HEPARIN SODIUM 5000 UNITS: 10000 INJECTION, SOLUTION INTRAVENOUS; SUBCUTANEOUS at 06:33

## 2024-09-25 RX ADMIN — GUAIFENESIN AND DEXTROMETHORPHAN 5 ML: 100; 10 SYRUP ORAL at 01:24

## 2024-09-25 ASSESSMENT — ACTIVITIES OF DAILY LIVING (ADL)
ADLS_ACUITY_SCORE: 39
ADLS_ACUITY_SCORE: 40

## 2024-09-25 NOTE — PLAN OF CARE
Problem: Adult Inpatient Plan of Care  Goal: Plan of Care Review  Description: The Plan of Care Review/Shift note should be completed every shift.  The Outcome Evaluation is a brief statement about your assessment that the patient is improving, declining, or no change.  This information will be displayed automatically on your shift  note.  Outcome: Adequate for Care Transition  Flowsheets (Taken 9/25/2024 1744)  Plan of Care Reviewed With: patient   Goal Outcome Evaluation:      Plan of Care Reviewed With: patient      Patient discharged to home at 1745 via Private Car.  Accompanied by son and staff.  Discharge instructions were reviewed with patient, opportunity offered to ask questions.    Prescriptions N/A.  Access discontinued: Yes  Care plan and education discontinued: Yes  Home meds retrieved from pharmacy: Yes  Belongings were sent home with patient/family:  Cell phone/electronics:  , Clothing: Shirt(s):   and Pants:  , Purse/Wallet:  , and Shoes:   .

## 2024-09-25 NOTE — CONSULTS
We have been asked to see Katt Dsouza at [facility] by Dr. Sridhar Valenzuela for management of heart failure.    Impression and Plan     Assessment:  Acute on chronic heart failure with preserved left ventricular ejection fraction. Her volume status is not clear to me.  Aortic stenosis which is probably closer to mild based on my personal review of the transthoracic echocardiogram performed 9/25/2024. I do not think this is contributing to her symptoms.  Borderline elevated and stable high-sensitivity troponin. This is not consistent with an acute coronary syndrome. CT coronary angiography 8/1/2019 showed normal coronary arteries with no atherosclerosis and nuclear stress testing 1/17/2024 showed no reversible ischemia.  Cardiac conduction disease with bradycardia on a very low dose of beta blocker as well as right bundle branch block and left anterior fascicular block. She seems asymptomatic from this.  Essential hypertension. Elevated.  Hyperlipidemia. Last LDL 74 mg/dL.  Non insulin-dependent diabetes mellitus type 2. Last hemoglobin A1c 6.5%.  Bilateral lower extremity venous incompetence noted on ultrasound testing 1/25/2024.  Acute kidney injury superimposed on chronic kidney disease stage III. Unclear etiology but may be due to cardiorenal syndrome or excessive diuresis.  Moderate persistent asthma possible with an acute exacerbation.  Possible obstructive sleep apnea.  Obesity with body mass index 35.45 kg/m .    Plan:  Right heart catheterization to assess her intracardiac filling pressures and to help guide diuretic administration  We will likely be able to start empagliflozin 10 mg daily pending the results of her right heart catheterization.  Hold carvedilol and all other rate-lowering agents at this time  Continue amlodipine 10 mg daily.  Hopefully we can resume losartan once her renal function stabilizes   If not done previously, she should have a sleep medicine consultation    Primary  Cardiologist: Dr. David Holman    Clinically Significant Risk Factors Present on Admission                  # Hypertension: Noted on problem list  # Acute heart failure with preserved ejection fraction: heart failure noted on problem list, last echo with EF >50%, and receiving IV diuretics       # DMII: A1C = 6.5 % (Ref range: <5.7 %) within past 6 months        # Asthma: noted on problem list     History of Present Illness      Ms. Katt Dsouza is a 84 year old female with a history of HFpEF, aortic stenosis, HTN and CKD stage III admitted yesterday with exertional chest pain or shortness of breath that began about 3 days ago. She feels she has gained about 7 pounds in the past month and feels her lower extremities are a bit swollen. No light headedness/dizziness, pre-syncope, syncope, palpitations, paroxysmal nocturnal dyspnea (PND), or orthopnea.    She was last seen in cardiology clinic 9/9/2024 where she was complaining of shortness of breath and it was felt that her symptoms may be partly pulmonary in origin. She did see general medicine 9/20/2024 and a prednisone prescription was written for a suspected asthma exacerbation. Due to uncontrolled HTN, at her 6/12/2024 cardiology visit she was started on carvedilol 3.125 mg twice daily.    On arrival to the ED, she was mildly bradycardic and hypertensive. ECG showed SR with LAFB and RBBB. Labs notable for creatinine increased to 2.1 (from 1.6 two weeks ago), hs-troponin mildly elevated but stable at 31 -> 29 -> 24. NT-proBNP was elevated at 8032 (previously 2096 two weeks ago). CXR showed no acute airspace disease. TTE showed normal LVE with moderate aortic stenosis and mild-to-moderate aortic regurgitation.    Review of Systems:  Further review of systems is otherwise negative/noncontributory (based on review of medical record (admission H&P) and 13 point review of systems reviewed. Pertinent positives noted).    Cardiac Diagnostics     ECG 9/24/2024  (personally reviewed and interpreted): SR, RBBB, LAFB QRS duration 138 ms    Telemetry (personally reviewed): sinus bradycardia HR 50s    Echocardiogram 9/25/2024 (results reviewed):   Left ventricular size, wall motion and function are normal. The ejection fraction is 60-65%.  There is mild concentric left ventricular hypertrophy.  Normal right ventricle size and systolic function.  The left atrium is moderately dilated.  There is mild mitral annular calcification.There is mild mitral stenosis.The mean mitral valve gradient is 4 mmHg.  Moderate valvular aortic stenosis. At SVI 58 mL/M2 dimensionless index 0.51, peak velocity 3.4 m/s with a mean gradient of 23 mmHg, there is also aortic insufficiency mild to moderate with deceleration pressure half-time of 422 ms.  Hemodynamically significant valve disease is identified. Recommend referral to valve clinic for further evaluation. Valve clinic phone number: [phone]. [facility] Epic: [facility].  There is no comparison study available.    CT coronary angiogram 8/1/2019 (results reviewed):     The total Agatston calcium score is 0. A calcium score of zero places the individual in the lowest quartile when compared to an age and gender matched control group and implies a low risk of cardiac events in the next 10 years. (less than 1% per year).    No significant obstructive plaque or stenosis within the visualized portions of the coronary tree.    Mitral annular calcification. Moderate calcification of the ascending and descending thoracic aorta. In the descending thoracic aorta there is soft and hardened plaque.    Please see separate report per radiology for additional findings    Cardiac Stress Testing 1/17/2024 (results reviewed):   Abnormal tomographic SPECT myocardial perfusion study: small size and medium degree of fixed perfusion defect in the RCA territory.  Normal LV systolic function with an EF of 56%.  No previous study available  for comparison.    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  No evidence of deep vein thrombosis of the left common femoral, femoral mid and popliteal veins.  Chronic valvular incompetence of the right saphenofemoral junction vein.  Chronic valvular incompetence of the left saphenofemoral junction vein.    Medical History  Surgical History Family History Social History   Past Medical History:   Diagnosis Date    Aortic valve disease     Diabetes (H)     Heart failure (H)     Hypertension     Uncomplicated asthma      Past Surgical History:   Procedure Laterality Date    BACK SURGERY      LAMINECTOMY LUMBAR ONE LEVEL Bilateral 2022    Procedure: LUMBAR 3 - LUMBAR 4 BILATERAL MEDIAL FACETECTOMY, DECOMPRESSION PARTIAL DISCECTOMY;  Surgeon: Leno Recinos MD;  Location: [facility] Main OR     Family History   Problem Relation Age of Onset    Diabetes Type 2  Mother     Heart Failure Father         Social History     Socioeconomic History    Marital status:      Spouse name: Not on file    Number of children: Not on file    Years of education: Not on file    Highest education level: Not on file   Occupational History    Not on file   Tobacco Use    Smoking status: Former     Current packs/day: 0.00     Types: Cigarettes     Quit date: 1995     Years since quittin.7    Smokeless tobacco: Never   Vaping Use    Vaping status: Never Used   Substance and Sexual Activity    Alcohol use: Yes     Comment: rare    Drug use: Never    Sexual activity: Not on file   Other Topics Concern    Not on file   Social History Narrative    Not on file     Social Determinants of Health     Financial Resource Strain: Not on file   Food Insecurity: Not on file   Transportation Needs: Not on file   Physical Activity: Not on file   Stress: Not on file   Social Connections: Not on file   Interpersonal Safety: Not on file   Housing Stability: Not  on file             Physical Examination   VITALS: BP (!) 168/72   Pulse 57   Temp 98.2  F (36.8  C) (Oral)   Resp 19   Wt 93.7 kg (206 lb 8 oz)   SpO2 97%   BMI 35.45 kg/m    BMI: Body mass index is 35.45 kg/m .  Wt Readings from Last 3 Encounters:   09/24/24 93.7 kg (206 lb 8 oz)   09/20/24 90.7 kg (200 lb)   09/09/24 92.1 kg (203 lb)         Intake/Output Summary (Last 24 hours) at 9/25/2024 1138  Last data filed at 9/25/2024 0930  Gross per 24 hour   Intake 360 ml   Output --   Net 360 ml       General Appearance:  Alert, cooperative, no distress, appears stated age    Head:  Normocephalic, without obvious abnormality, atraumatic   Eyes:  PERRL, conjunctiva/corneas clear, EOM's intact   Ears:  Normal external ear canals bilaterally   Nose: Nares normal, septum midline, no drainage   Throat: Lips, mucosa, and tongue normal; teeth and gums normal   Neck: Supple, symmetrical, trachea midline, no adenopathy, no carotid bruit. JVP not well seen.   Back:   Symmetric, no abnormal curvature, ROM normal   Lungs:   Diminished breaths sounds with trace wheezing, respirations unlabored   Chest Wall:  No tenderness or deformity   Heart:  Regular rate and rhythm. 3/6 mid-peaking systolic crescendo-decrescendo murmur heard loudest at the upper sternal border. No rub or gallop   Abdomen:   Soft, non-tender, bowel sounds active all four quadrants,  no masses, no organomegaly   Extremities: Extremities normal, atraumatic, no cyanosis. Trace lower extremity edema bilaterally.   Skin: Skin color, texture, turgor normal, no rashes or lesions   Psychiatric: Normal affect, pleasant and cooperative   Neurologic: Alert and oriented X 3, Moves all 4 extremities            Imaging      CXR 9/24/2024 (report reviewed):  Lungs are clear.   Heart and pulmonary vascularity are normal given projection.   No signs of failure.  Interval placement of mid thoracic epidural leads.    CXR 9/24/2024 (report reviewed):  1.  Slightly echogenic  renal cortices again noted without evidence of hydronephrosis.  2.  Bilateral renal cysts.     Lab Results    Chemistry/lipid CBC Cardiac Enzymes/BNP/TSH/INR   Recent Labs   Lab Test 05/28/24  1512   CHOL 166   HDL 59   LDL 74   TRIG 165*     Recent Labs   Lab Test 05/28/24  1512 11/08/23  1215 10/03/22  1127   LDL 74 85 80     Recent Labs   Lab Test 09/25/24  0907 09/25/24  0656   NA  --  141   POTASSIUM  --  4.1   CHLORIDE  --  104   CO2  --  25   * 129*   BUN  --  56.3*   CR  --  1.74*   GFRESTIMATED  --  29*   MARIO  --  8.8     Recent Labs   Lab Test 09/25/24  0656 09/24/24  1432 09/24/24  1240   CR 1.74* 2.07* 2.05*     Recent Labs   Lab Test 09/24/24  1240 11/08/23  1215   A1C 6.5* 6.5*          Recent Labs   Lab Test 09/25/24  0656   WBC 10.9   HGB 11.6*   HCT 35.1   MCV 96        Recent Labs   Lab Test 09/25/24  0656 09/24/24  1240 05/28/24  1512   HGB 11.6* 11.8 12.6    Recent Labs   Lab Test 01/28/22  2151 01/28/22  1831   TROPONINI 0.03 0.01     Recent Labs   Lab Test 09/24/24  1240 09/09/24  1109 09/11/23  1113 05/19/22  1428 04/26/21  1455 03/24/21  1219 07/05/19  1037   BNP  --   --   --  78 48  --  40   NTBNPI 8,032*  --   --   --   --   --   --    NTBNP  --  2,096* 302  --   --  113  --      Recent Labs   Lab Test 05/28/24  1512   TSH 1.11     Recent Labs   Lab Test 01/28/22  1831   INR 1.01           Medications Prior to Admission   Prior to Admission medications    Medication Sig Start Date End Date Taking? Authorizing Provider   albuterol (PROAIR HFA/PROVENTIL HFA/VENTOLIN HFA) 108 (90 Base) MCG/ACT inhaler Inhale 2 puffs into the lungs every 4 hours as needed for shortness of breath or wheezing 11/7/23  Yes Batsheva Huerta, NP   allopurinoL (ZYLOPRIM) 100 MG tablet [ALLOPURINOL (ZYLOPRIM) 100 MG TABLET] Take 100 mg by mouth daily. 3/21/21  Yes Provider, Historical   amLODIPine (NORVASC) 10 MG tablet Take 1 tablet (10 mg) by mouth daily 6/12/24  Yes Lottie Murillo PA-C    atorvastatin (LIPITOR) 20 MG tablet [ATORVASTATIN (LIPITOR) 20 MG TABLET] Take 20 mg by mouth at bedtime. 1/29/19  Yes Provider, Historical   carvedilol (COREG) 3.125 MG tablet Take 1 tablet (3.125 mg) by mouth 2 times daily (with meals) 6/12/24  Yes Lottie Murillo PA-C   fluticasone (FLONASE) 50 MCG/ACT nasal spray Spray 2 sprays into both nostrils 2 times daily as needed for rhinitis or allergies.   Yes Unknown, Entered By History   Fluticasone-Umeclidin-Vilant (TRELEGY ELLIPTA) 100-62.5-25 MCG/ACT oral inhaler Inhale 1 puff into the lungs daily 3/26/24  Yes Batsheva Huerta, NP   gabapentin (NEURONTIN) 300 MG capsule Take 600 mg by mouth 2 times daily. 4/20/21  Yes Provider, Historical   LASIX 40 MG tablet Take 1 tablet by mouth daily 4/4/24  Yes Reported, Patient   levothyroxine (SYNTHROID/LEVOTHROID) 50 MCG tablet Take 50 mcg by mouth daily.   Yes Unknown, Entered By History   losartan (COZAAR) 100 MG tablet Take 100 mg by mouth daily   Yes Reported, Patient   metFORMIN (GLUCOPHAGE XR) 500 MG 24 hr tablet Take 1,000 mg by mouth 2 times daily (with meals).   Yes Unknown, Entered By History   predniSONE (DELTASONE) 20 MG tablet Take 2 tablets (40 mg) by mouth daily. 9/20/24  Yes Venecia Dominguez APRN CNP   zolpidem (AMBIEN) 10 MG tablet Take 10 mg by mouth at bedtime.   Yes Unknown, Entered By History          Janusz Miller MD [facility]  Non-Invasive Cardiologist  [facility]  Pager [phone]

## 2024-09-25 NOTE — PROGRESS NOTES
[facility]    Medicine Progress Note - Hospitalist Service    Date of Admission:  9/24/2024    Assessment & Plan      Katt Dsouza is a 84 year old female with PMH CHFpEF, asthma,.  DM type II, diabetic neuropathy, gout, hyperlipidemia, hypothyroidism, and hypertension is admitted on 9/24/2024 with chest pressure and shortness of breath likely 2/2 CHF exacerbation.  Patient states that she no longer has chest pain.  Shortness of breath is resolved at rest only with activity.  Echo ordered results.  Cardiology consulted order angiogram for today.  Results of angiogram are pending.  Cardiology awaiting angiogram results to decide diuresis plan. Patient likely discharge tomorrow if symptoms improve and no new symptoms develop.     #Chest pressure radiating to left neck and arm  -Troponin-31--> 29  -EKG-sinus bradycardia.  No signs of ischemia  -Chest x-ray-lungs are clear.  Heart and pulmonary vascularity are normal.  No signs of failure.  -Cardiology consult ; Right heart catheterization to assess her intracardiac filling pressures and to help guide diuretic administration. We will likely be able to start empagliflozin 10 mg daily pending the results of her right heart catheterization.Hold carvedilol and all other rate-lowering agents at this time. Continue amlodipine 10 mg daily.Hopefully we can resume losartan once her renal function stabilizes If not done previously, she should have a sleep medicine consultation     #Shortness of breath   #Acute on chronic CHF exacerbation  -BNP-8,032  -Chest x-ray-lungs are clear.  Heart and pulmonary vascularity are normal.  No signs of failure.  -IV Lasix 20 mg   -Echocardiogram results:.Left ventricular size, wall motion and function are normal. The ejection  fraction is 60-65%.There is mild concentric left ventricular hypertrophy.Normal right ventricle size and systolic function.The left atrium is moderately dilated.There is mild mitral annular  calcification.There is mild mitral stenosis.The mean mitral valve gradient is 4mmHg.Moderate valvular aortic stenosis. At SVI 58 mL/M2 dimensionless index 0.51, peak velocity 3.4 m/s with a mean gradient of 23 mmHg, there is also aortic insufficiency mild to moderate with deceleration pressure half-time of 422 ms.Hemodynamically significant valve disease is identified. Recommend referralto valve clinic for further evaluation. Valve clinic phone number: [phone].  -Cardiology consult:recommend angiogram today.   -PTA Lasix on hold, cardiology awaiting angiogram results to decide diuresis plan    #[place]  -Creatinine-2.05 yesterday, trending down 1.74 today  -Hold PTA lasix and losartan  -Nephrology consult recommendations pending  -UA abnormal  Urine culture pending  Patient denies urinary symptoms  -Renal ultrasound: Bilateral renal cysts no hydronephrosis  Trend BMP in a.m.    #Bradycardia  -Asymptomatic  -EKG-sinus bradycardia  Cardiac telemetry monitor, reviewed stable no ectopy or arrhythmias  -Hold PTA carvedilol    #DM type II  -Hemoglobin A1c-6.5%  -Hold PTA Metformin  -Accu-Cheks and sliding scale insulin    #Diabetic neuropathy  -PTA gabapentin.  Dose reduced due to [place]    #Hypertension  -PTA amlodipine-as needed hydralazine    #Hyperlipidemia  -PTA atorvastatin    #Gout  -PTA allopurinol    #Hypothyroidism  -PTA levothyroxine    #Asthma  -PTA inhalers       Observation Goals: -diagnostic tests and consults completed and resulted, -vital signs normal or at patient baseline, -dyspnea improved and O2 sats greater than 88% on room air or prior home oxygen levels, Nurse to notify provider when observation goals have been met and patient is ready for discharge.  Diet: Fluid restriction 2000 ML FLUID (and additional linked orders)  Clear Liquid Diet    DVT Prophylaxis: Heparin SQ  Gil Catheter: Not present  Lines: None     Cardiac Monitoring: ACTIVE order. Indication: Acute decompensated heart failure (48  hours)  Code Status: Full Code      Clinically Significant Risk Factors Present on Admission                  # Hypertension: Noted on problem list  # Acute heart failure with preserved ejection fraction: heart failure noted on problem list, last echo with EF >50%, and receiving IV diuretics       # DMII: A1C = 6.5 % (Ref range: <5.7 %) within past 6 months        # Asthma: noted on problem list        Disposition Plan     Medically Ready for Discharge: Anticipated in 2-4 Days         The patient's care was discussed with the Attending Physician, Dr. Valenzuela .    Mariia Delgado NP  Hospitalist Service  [facility]  Securely message with [employer] (more info)  Text page via [employer] Paging/Directory   ______________________________________________________________________    Interval History     Patient denies chest pain with breath only with activity  Cardiology consult  Angiogram today results are pending  Creatinine elevated yesterday 2.07 trending down 1.4 today.   Trend BMP in    Physical Exam   Vital Signs: Temp: 97.9  F (36.6  C) Temp src: Oral BP: (!) 184/78 Pulse: 62   Resp: 19 SpO2: 97 % O2 Device: None (Room air)    Weight: 206 lbs 8 oz    Constitutional: awake, alert, cooperative, no apparent distress, and appears stated age  Hematologic / Lymphatic: no cervical lymphadenopathy and no supraclavicular lymphadenopathy  Respiratory: No increased work of breathing, good air exchange, clear to auscultation bilaterally, no crackles or wheezing  Cardiovascular: Normal apical impulse, regular rate and rhythm, normal S1 and S2, no S3 or S4, and no murmur noted  GI: No scars, normal bowel sounds, soft, non-distended, non-tender, no masses palpated, no hepatosplenomegally  Skin: no bruising or bleeding, normal skin color, texture, turgor, and no redness, warmth, or swelling  Musculoskeletal: There is no redness, warmth, or swelling of the joints.  Full range of motion noted.  Motor strength is 5 out  of 5 all extremities bilaterally.    Neurologic: Awake, alert, oriented to name, place and time.  Cranial nerves II-XII are grossly intact.  Motor is 5 out of 5 bilaterally.    Neuropsychiatric: General: normal, calm, and normal eye contact    Medical Decision Making       50 MINUTES SPENT BY ME on the date of service doing chart review, history, exam, documentation & further activities per the note.      Data     I have personally reviewed the following data over the past 24 hrs:    10.9  \   11.6 (L)   / 289     141 104 56.3 (H) /  205 (H)   4.1 25 1.74 (H) \     Trop: 24 (H) BNP: N/A     TSH: N/A T4: N/A A1C: N/A       Imaging results reviewed over the past 24 hrs:   Recent Results (from the past 24 hour(s))   XR Chest Port 1 View    Narrative    EXAM: XR CHEST PORT 1 VIEW  LOCATION: [facility]  DATE: 9/24/2024    INDICATION: chest pain  COMPARISON: 9/11/2023 and older studies      Impression    IMPRESSION: Lungs are clear. Heart and pulmonary vascularity are normal given projection. No signs of failure.    Interval placement of mid thoracic epidural leads.   US Renal Complete Non-Vascular    Narrative    EXAM: US RENAL COMPLETE NON-VASCULAR  LOCATION: [facility]  DATE: 9/24/2024    INDICATION: A[place]  COMPARISON: Renal ultrasound 11/9/2022, CT AP 11/26/2019  TECHNIQUE: Routine Bilateral Renal and Bladder Ultrasound.    FINDINGS:    RIGHT KIDNEY: 10.7 x 6.3 x 6 cm. Small simple cyst noted superiorly in the upper pole. No hydronephrosis. Thinned and echogenic renal cortex again noted.    LEFT KIDNEY: 13.3 x 7.1 x 7.1 cm. RAH 5 cm simple cyst noted in the upper pole. No hydronephrosis. An echogenic renal cortex again noted.    BLADDER: Collapsed as she just voided.      Impression    IMPRESSION:  1.  Slightly echogenic renal cortices again noted without evidence of hydronephrosis.  2.  Bilateral renal cysts.   Echocardiogram Complete   Result Value    LVEF  60-65%     [facility]    [id number]  [id number]  [id number]  [id number]^[address]IO^[place] [address]     Name: DARELL UGARTE  MRN: [id number]  : 1940  Study Date: 2024 09:34 AM  Age: 84 yrs  Gender: Female  Patient [facility]  Reason For Study: CHF  Ordering Physician: CURT POWERS  Performed By: MB     BSA: 2.0 m2  Height: 64 in  Weight: 206 lb  HR: 65  BP: 168/72 mmHg  ______________________________________________________________________________  Procedure  There is no comparison study available. Hemodynamically significant valve  disease is identified. Recommend referral to valve clinic for further  evaluation. Valve clinic phone number: [phone]. [facility] Epic:  [facility].  ______________________________________________________________________________  Interpretation Summary     1.Left ventricular size, wall motion and function are normal. The ejection  fraction is 60-65%.  2.There is mild concentric left ventricular hypertrophy.  3.Normal right ventricle size and systolic function.  4.The left atrium is moderately dilated.  5.There is mild mitral annular calcification.There is mild mitral stenosis.The  mean mitral valve gradient is 4mmHg.  6.Moderate valvular aortic stenosis. At SVI 58 mL/M2 dimensionless index 0.51,  peak velocity 3.4 m/s with a mean gradient of 23 mmHg, there is also aortic  insufficiency mild to moderate with deceleration pressure half-time of 422 ms.  7.Hemodynamically significant valve disease is identified. Recommend referral  to valve clinic for further evaluation. Valve clinic phone number: [phone]. [facility] Epic: [facility] Valve [facility].  There is no comparison study available.  ______________________________________________________________________________  I      WMSI = 1.00     % Normal = 100     X - Cannot   0 -                      (2) - Mildly 2 -          Segments   Size  Interpret    Hyperkinetic 1 - Normal  Hypokinetic  Hypokinetic  1-2     small                                                     7 -          3-5      moderate  3 - Akinetic 4 -          5 -         6 - Akinetic Dyskinetic   6-14    large               Dyskinetic   Aneurysmal  w/scar       w/scar       15-16   diffuse     Left Ventricle  Left ventricular size, wall motion and function are normal. The ejection  fraction is 60-65%. There is mild concentric left ventricular hypertrophy.  Left ventricular diastolic function is normal. No regional wall motion  abnormalities noted.     Right Ventricle  Normal right ventricle size and systolic function. TAPSE is normal, which is  consistent with normal right ventricular systolic function.     Atria  The left atrium is moderately dilated. Right atrial size is normal. There is  no color Doppler evidence of an atrial shunt.     Mitral Valve  There is mild mitral annular calcification. There is trace mitral  regurgitation. There is mild mitral stenosis. The mean mitral valve gradient  is 4mmHg.     Tricuspid Valve  Tricuspid valve leaflets appear normal. Right ventricle systolic pressure  estimate normal. There is trace tricuspid regurgitation. There is no tricuspid  stenosis.     Aortic Valve  There is mild to moderate (1-2+) aortic regurgitation. Moderate valvular  aortic stenosis. At SVI 58 mL/M2 dimensionless index 0.51, peak velocity 3.4  m/s with a mean gradient of 23 mmHg, there is also aortic insufficiency mild  to moderate with deceleration pressure half-time of 422 ms.     Pulmonic Valve  The pulmonic valve is not well seen, but is grossly normal. This degree of  valvular regurgitation is within normal limits. There is no pulmonic valvular  stenosis.     Vessels  The aorta root is normal. Normal size ascending aorta. IVC diameter <2.1 cm  collapsing >50% with sniff suggests a normal RA pressure of 3 mmHg.     Pericardium  There is no pericardial effusion.      Rhythm  Sinus rhythm was noted.     ______________________________________________________________________________  MMode/2D Measurements & Calculations  IVSd: 1.3 cm  LVIDd: 4.9 cm  LVIDs: 3.6 cm  LVPWd: 1.3 cm  FS: 27.7 %     LV mass(C)d: 251.7 grams  LV mass(C)dI: 127.1 grams/m2  asc Aorta Diam: 3.5 cm  LVOT diam: 2.0 cm  LVOT area: 3.1 cm2  Asc Ao diam index BSA (cm/m2): 1.8  Asc Ao diam index Ht(cm/m): 2.2  EF Biplane: 52.8 %  LA Volume (BP): 130.0 ml  LA Volume Index (BP): 65.7 ml/m2     LA Volume Indexed (AL/bp): 68.8 ml/m2  RV Base: 3.2 cm  RWT: 0.53  TAPSE: 2.4 cm     Time Measurements  MM HR: 65.0 BPM     Doppler Measurements & Calculations  MV E max nitish: 142.0 cm/sec  MV A max nitish: 141.0 cm/sec  MV E/A: 1.0  MV max PG: 10.4 mmHg  MV mean P.0 mmHg  MV V2 VTI: 51.3 cm  MVA(VTI): 2.2 cm2     MV P1/2t max nitish: 171.0 cm/sec  MV P1/2t: 71.4 msec  MVA(P1/2t): 3.1 cm2  MV dec slope: 701.0 cm/sec2  MV dec time: 0.21 sec  Ao V2 max: 314.3 cm/sec  Ao max P.0 mmHg  Ao V2 mean: 224.0 cm/sec  Ao mean P.0 mmHg  Ao V2 VTI: 80.6 cm  SABI(I,D): 1.4 cm2  SABI(V,D): 1.6 cm2  AI P1/2t: 421.8 msec  LV V1 max PG: 10.2 mmHg  LV V1 max: 160.0 cm/sec  LV V1 VTI: 36.7 cm  SV(LVOT): 115.3 ml  SI(LVOT): 58.2 ml/m2  PA acc time: 0.12 sec  AV Nitish Ratio (DI): 0.51  SABI Index (cm2/m2): 0.72  E/E': 19.5  E/E' av.6  Lateral E/e': 21.7  Medial E/e': 19.5  Peak E' Nitish: 7.3 cm/sec  RV S Nitish: 17.7 cm/sec     ______________________________________________________________________________  Report approved by: Madelyn Floyd 2024 11:16 AM

## 2024-09-25 NOTE — PLAN OF CARE
"PRIMARY DIAGNOSIS: \"GENERIC\" NURSING  OUTPATIENT/OBSERVATION GOALS TO BE MET BEFORE DISCHARGE:  ADLs back to baseline: Yes    Activity and level of assistance: Ambulating independently to bathroom.    Pain status: c/o chest pain, EKG and trops done    Return to near baseline physical activity: Yes     Discharge Planner Nurse   Safe discharge environment identified: No  Barriers to discharge: Yes       Entered by: Ruiz Soto RN 09/25/2024 2:23 AM     Please review provider order for any additional goals.   Nurse to notify provider when observation goals have been met and patient is ready for discharge.      Pt denies nausea. Appears anxious. Up in chair. C/o SOB and chest pain. Dr. Zambrano notified and EKG and trops ordered. Pt appears unhappy, upset, and made multiple complaints regarding how busy the ER was, no private bathrooms here in short stay unit, \"my stay is horrible\", requesting pt advocates number, \"my cardiologist told me to come here but I haven't seen him\", \"I'm coughing now I need something for cough,\", \"I need my nasal spray and inhaler at bedside\" - explained to pt hospital's protocol on getting home meds barcoded by pharmacy and keeping them locked up for pt's use in the medication room but will be discharged to pt at discharge. Also spoke with Dr. Reyna again and received orders for atarax, robitussin, and chloraseptic lozenge. Pt went down to ultrasound this shift. Still needs echo done.   "

## 2024-09-25 NOTE — PROGRESS NOTES
RHC shows normal filling pressures. Would continue her current oral diuretic and add an SGLT-2 inhibitor such as empagliflozin 10 mg daily if possible. Routine follow-up in cardiology clinic. Okay to discharge home from a cardiac perspective.    Janusz Miller MD [facility]  Non-Invasive Cardiologist  [facility]  Pager [phone]

## 2024-09-25 NOTE — DISCHARGE SUMMARY
[facility] MEDICINE  DISCHARGE SUMMARY     Primary Care Physician: Heidy Strickland  Admission Date: 9/24/2024   Discharge Provider: Sridhar Valenzuela MD Discharge Date: 9/25/2024   Diet:   Active Diet and Nourishment Order   Procedures    Fluid restriction 2000 ML FLUID    Advance Diet as Tolerated: Clear Liquid Diet    Diet       Code Status: Full Code   Activity: DCACTIVITY: Activity as tolerated        Condition at Discharge: Good     REASON FOR PRESENTATION(See Admission Note for Details)   Dyspnea    PRINCIPAL & ACTIVE DISCHARGE DIAGNOSES     Active Problems:    HUBBARD (dyspnea on exertion)    Chest pain, unspecified type    Acute on chronic heart failure with preserved ejection fraction (H)      PENDING LABS     Unresulted Labs Ordered in the Past 30 Days of this Admission       Date and Time Order Name Status Description    9/25/2024 11:32 AM Urine Culture In process               PROCEDURES ( this hospitalization only)      Procedure(s):  Right Heart Catheterization    RECOMMENDATIONS TO OUTPATIENT PROVIDER FOR F/U VISIT     Follow-up Appointments     Follow-up and recommended labs and tests       1.  Follow-up in cardiology clinic within the next 5 days.  Consider   moving tomorrow's appointment to next week but if unable would recommend   following up tomorrow.  You will need repeat basic metabolic profile to   assess renal function within 5 days.  2.  Follow-up as scheduled with nephrology                DISPOSITION     Home    SUMMARY OF HOSPITAL COURSE:      Katt is a 84-year-old female admitted with acute on chronic heart failure in the context of aortic stenosis.  Had borderline elevated high-sensitivity troponin.  Also had bradycardia.  Also with [place] on chronic kidney disease was seen by cardiology and right heart catheterization was obtained which showed no acute abnormalities.  Was seen by nephrology.  Creatinine today improved to 1.74 from 2.07 with recent  baseline around 1.4.  Nephrology recommending outpatient follow-up chest scheduled for October night.  Cardiology recommending restarting diuretics, will continue to hold losartan until creatinine improved.  Cardiology also recommending at some point SGLT-2 inhibitor but this was not started in the hospital and she has a follow-up appointment tomorrow in which this can be addressed.  This was discussed with her and it would likely be more appropriate to follow-up a couple days later but she will need repeat of basic metabolic profile within that timeframe to help guide therapy going forward.  Currently she feels back to her normal baseline self and has no physical complaints, no chest pain or shortness of breath and she is eager for discharge.  Vitals are stable and there is no medical contraindication for discharge today.  Per cardiology, carvedilol was held secondary to bradycardia and her heart rate is improved at discharge.  Defer resumption to cardiology service    Discharge Medications with Med changes:     Current Discharge Medication List        CONTINUE these medications which have NOT CHANGED    Details   albuterol (PROAIR HFA/PROVENTIL HFA/VENTOLIN HFA) 108 (90 Base) MCG/ACT inhaler Inhale 2 puffs into the lungs every 4 hours as needed for shortness of breath or wheezing  Qty: 8.5 g, Refills: 11    Comments: Pharmacy may dispense brand covered by insurance (Proair, or proventil or ventolin or generic albuterol inhaler)  Associated Diagnoses: Moderate persistent asthma without complication      allopurinoL (ZYLOPRIM) 100 MG tablet [ALLOPURINOL (ZYLOPRIM) 100 MG TABLET] Take 100 mg by mouth daily.      amLODIPine (NORVASC) 10 MG tablet Take 1 tablet (10 mg) by mouth daily  Qty: 90 tablet, Refills: 3    Associated Diagnoses: Benign essential hypertension      atorvastatin (LIPITOR) 20 MG tablet [ATORVASTATIN (LIPITOR) 20 MG TABLET] Take 20 mg by mouth at bedtime.      fluticasone (FLONASE) 50 MCG/ACT nasal  spray Spray 2 sprays into both nostrils 2 times daily as needed for rhinitis or allergies.      Fluticasone-Umeclidin-Vilant (TRELEGY ELLIPTA) 100-62.5-25 MCG/ACT oral inhaler Inhale 1 puff into the lungs daily  Qty: 60 each, Refills: 11    Associated Diagnoses: Moderate persistent asthma without complication      gabapentin (NEURONTIN) 300 MG capsule Take 600 mg by mouth 2 times daily.      LASIX 40 MG tablet Take 1 tablet by mouth daily      levothyroxine (SYNTHROID/LEVOTHROID) 50 MCG tablet Take 50 mcg by mouth daily.      metFORMIN (GLUCOPHAGE XR) 500 MG 24 hr tablet Take 1,000 mg by mouth 2 times daily (with meals).      zolpidem (AMBIEN) 10 MG tablet Take 10 mg by mouth at bedtime.           STOP taking these medications       carvedilol (COREG) 3.125 MG tablet Comments:   Reason for Stopping:         losartan (COZAAR) 100 MG tablet Comments:   Reason for Stopping:         predniSONE (DELTASONE) 20 MG tablet Comments:   Reason for Stopping:                     Rationale for medication changes:              Consults       CORE CLINIC EVALUATION IP CONSULT  OCCUPATIONAL THERAPY ADULT IP CONSULT  CARDIOLOGY IP CONSULT  NEPHROLOGY IP CONSULT  PHARMACY LIAISON FOR MEDICATION COVERAGE CONSULT    Immunizations given this encounter     Most Recent Immunizations   Administered Date(s) Administered    COVID-19 12+ (Pfizer) 09/18/2023    COVID-19 Bivalent 18+ (Moderna) 04/25/2023    COVID-19 Monovalent 18+ (Moderna) 04/02/2022    Flu 65+ (Fluad) 09/23/2020    Flu, Unspecified 10/25/2003    Hepatitis A (ADULT 19+) 06/25/1999    Hepatitis A Not Indicated - By Hx 06/25/1999    Influenza (H1N1) 11/20/2009, 11/20/2009    Influenza (High Dose) Trivalent,PF (Fluzone) 10/08/2018    Influenza (IIV3) PF 09/15/2020    Influenza Vaccine 65+ (FLUAD) 10/03/2022    Influenza Vaccine 65+ (Fluzone HD) 09/15/2023    Influenza Vaccine, 6+MO IM (QUADRIVALENT W/PRESERVATIVES) 10/03/2019    Influenza, seasonal, injectable, PF 09/11/2009     "Pneumo Conj 13-V (2010&after) 12/03/2014    Pneumococcal 20 valent Conjugate (Prevnar 20) 11/08/2023    Pneumococcal 23 valent 10/02/2013    RSV Vaccine (Arexvy) 09/15/2023    TD,PF 7+ (Tenivac) 08/13/2007    TDAP (Adacel,Boostrix) 09/04/2012    TDAP Vaccine (Adacel) 09/04/2012    Td (Adult), Adsorbed 08/13/2007    Zoster recombinant adjuvanted (SHINGRIX) 08/22/2019    Zoster vaccine, live 08/13/2007           Anticoagulation Information      Recent INR results: No results for input(s): \"INR\" in the last 168 hours.  Warfarin doses (if applicable) or name of other anticoagulant:       SIGNIFICANT IMAGING FINDINGS     Results for orders placed or performed during the hospital encounter of 09/24/24   XR Chest Port 1 View    Impression    IMPRESSION: Lungs are clear. Heart and pulmonary vascularity are normal given projection. No signs of failure.    Interval placement of mid thoracic epidural leads.   US Renal Complete Non-Vascular    Impression    IMPRESSION:  1.  Slightly echogenic renal cortices again noted without evidence of hydronephrosis.  2.  Bilateral renal cysts.   Echocardiogram Complete   Result Value Ref Range    LVEF  60-65%        SIGNIFICANT LABORATORY FINDINGS     Most Recent 3 BMP's:  Recent Labs   Lab Test 09/25/24  1515 09/25/24  1306 09/25/24  0907 09/25/24  0656 09/24/24  1808 09/24/24  1432 09/24/24  1240   NA  --   --   --  141  --  140 138   POTASSIUM  --   --   --  4.1  --  5.3 5.2   CHLORIDE  --   --   --  104  --  103 102   CO2  --   --   --  25  --  21* 21*   BUN  --   --   --  56.3*  --  64.4* 63.8*   CR  --   --   --  1.74*  --  2.07* 2.05*   ANIONGAP  --   --   --  12  --  16* 15   MARIO  --   --   --  8.8  --  9.0 8.7*   * 205* 127* 129*   < > 268* 341*    < > = values in this interval not displayed.           Discharge Orders        Reason for your hospital stay    dyspnea     Follow-up and recommended labs and tests     1.  Follow-up in cardiology clinic within the next 5 days.  " Consider moving tomorrow's appointment to next week but if unable would recommend following up tomorrow.  You will need repeat basic metabolic profile to assess renal function within 5 days.  2.  Follow-up as scheduled with nephrology     Activity    Your activity upon discharge: activity as tolerated     Diet    Follow this diet upon discharge: Current Diet:Orders Placed This Encounter      Fluid restriction 2000 ML FLUID      Advance Diet as Tolerated: Clear Liquid Diet       Examination   Physical Exam   Temp:  [97.6  F (36.4  C)-98.2  F (36.8  C)] 98.1  F (36.7  C)  Pulse:  [50-72] 70  Resp:  [15-26] 16  BP: (166-199)/(72-78) 166/72  SpO2:  [94 %-97 %] 96 %  Wt Readings from Last 1 Encounters:   09/24/24 93.7 kg (206 lb 8 oz)       General: No apparent distress, sitting up in chair, comfortable and talkative  Heart: Regular rate rhythm    Please see EMR for more detailed significant labs, imaging, consultant notes etc.    I, Sridhar Valenzuela MD, personally saw the patient today and spent greater than 30 minutes discharging this patient.    Sridhar Valenzuela MD  [facility]    CC:Heidy Strickland

## 2024-09-25 NOTE — PROGRESS NOTES
"PRIMARY DIAGNOSIS: \"GENERIC\" NURSING  OUTPATIENT/OBSERVATION GOALS TO BE MET BEFORE DISCHARGE:  ADLs back to baseline: Yes    Activity and level of assistance: Ambulating independently.    Pain status: Improved-controlled with oral pain medications.    Return to near baseline physical activity: Yes     Discharge Planner Nurse   Safe discharge environment identified: Yes  Barriers to discharge: Yes       Entered by: Eran Avila RN 09/25/2024 4:47 AM     Please review provider order for any additional goals.   Nurse to notify provider when observation goals have been met and patient is ready for discharge.    A&O x 4. Admitted for dyspnea on exertion. PIV access right upper forearm. Bed in low position, call light within reach. Non-slip footwear in use. Patient calls appropriately.   Visit Vitals  BP (!) 174/72 (BP Location: Left arm)   Pulse 60   Temp 98  F (36.7  C) (Oral)   Resp 21           "

## 2024-09-25 NOTE — PROVIDER NOTIFICATION
Notified Resident at 2112 PM regarding  :  Pt c/o chest pain 6/10 and no prn to give. Can she have anything? Thanks!    Spoke with: Dr. Reyna    Orders were obtained. Dr. Reyna saw pt and ordered troponin and EKG.

## 2024-09-25 NOTE — DISCHARGE SUMMARY
Deleted by Sridhar Valenzuela MD 9/25/2024  4:40 PM         Show:Clear all  [x]Written[x]Templated[]Copied    Added by:  [x]Sridhar Valenzuela MD    []Cathy for details     [facility] MEDICINE  DISCHARGE SUMMARY      Primary Care Physician: Heidy Strickland                                                                  Admission Date: 9/24/2024   Discharge Provider: Sridhar Valenzuela MD Discharge Date: 9/25/2024   Diet:       Active Diet and Nourishment Order   Procedures    Fluid restriction 2000 ML FLUID    Advance Diet as Tolerated: Clear Liquid Diet    Diet        Code Status: Full Code   Activity: DCACTIVITY: Activity as tolerated           Condition at Discharge: Good     REASON FOR PRESENTATION(See Admission Note for Details)   Dyspnea     PRINCIPAL & ACTIVE DISCHARGE DIAGNOSES      Active Problems:    HUBBARD (dyspnea on exertion)    Chest pain, unspecified type    Acute on chronic heart failure with preserved ejection fraction (H)        PENDING LABS      Unresulted Labs Ordered in the Past 30 Days of this Admission         Date and Time Order Name Status Description     9/25/2024 11:32 AM Urine Culture In process                     PROCEDURES ( this hospitalization only)       Procedure(s):  Right Heart Catheterization     RECOMMENDATIONS TO OUTPATIENT PROVIDER FOR F/U VISIT      Follow-up Appointments     Follow-up and recommended labs and tests       1.  Follow-up in cardiology clinic within the next 5 days.  Consider   moving tomorrow's appointment to next week but if unable would recommend   following up tomorrow.  You will need repeat basic metabolic profile to   assess renal function within 5 days.  2.  Follow-up as scheduled with nephrology                   DISPOSITION      Home     SUMMARY OF HOSPITAL COURSE:       Katt is a 84-year-old female admitted with acute on chronic heart failure in the context of aortic stenosis.  Had borderline elevated  high-sensitivity troponin.  Also had bradycardia.  Also with [place] on chronic kidney disease was seen by cardiology and right heart catheterization was obtained which showed no acute abnormalities.  Was seen by nephrology.  Creatinine today improved to 1.74 from 2.07 with recent baseline around 1.4.  Nephrology recommending outpatient follow-up chest scheduled for October night.  Cardiology recommending restarting diuretics, will continue to hold losartan until creatinine improved.  Cardiology also recommending at some point SGLT-2 inhibitor but this was not started in the hospital and she has a follow-up appointment tomorrow in which this can be addressed.  This was discussed with her and it would likely be more appropriate to follow-up a couple days later but she will need repeat of basic metabolic profile within that timeframe to help guide therapy going forward.  Currently she feels back to her normal baseline self and has no physical complaints, no chest pain or shortness of breath and she is eager for discharge.  Vitals are stable and there is no medical contraindication for discharge today.  Per cardiology, carvedilol was held secondary to bradycardia and her heart rate is improved at discharge.  Defer resumption to cardiology service     Discharge Medications with Med changes:      Current Discharge Medication List               CONTINUE these medications which have NOT CHANGED     Details   albuterol (PROAIR HFA/PROVENTIL HFA/VENTOLIN HFA) 108 (90 Base) MCG/ACT inhaler Inhale 2 puffs into the lungs every 4 hours as needed for shortness of breath or wheezing  Qty: 8.5 g, Refills: 11     Comments: Pharmacy may dispense brand covered by insurance (Proair, or proventil or ventolin or generic albuterol inhaler)  Associated Diagnoses: Moderate persistent asthma without complication       allopurinoL (ZYLOPRIM) 100 MG tablet [ALLOPURINOL (ZYLOPRIM) 100 MG TABLET] Take 100 mg by mouth daily.       amLODIPine  (NORVASC) 10 MG tablet Take 1 tablet (10 mg) by mouth daily  Qty: 90 tablet, Refills: 3     Associated Diagnoses: Benign essential hypertension       atorvastatin (LIPITOR) 20 MG tablet [ATORVASTATIN (LIPITOR) 20 MG TABLET] Take 20 mg by mouth at bedtime.       fluticasone (FLONASE) 50 MCG/ACT nasal spray Spray 2 sprays into both nostrils 2 times daily as needed for rhinitis or allergies.       Fluticasone-Umeclidin-Vilant (TRELEGY ELLIPTA) 100-62.5-25 MCG/ACT oral inhaler Inhale 1 puff into the lungs daily  Qty: 60 each, Refills: 11     Associated Diagnoses: Moderate persistent asthma without complication       gabapentin (NEURONTIN) 300 MG capsule Take 600 mg by mouth 2 times daily.       LASIX 40 MG tablet Take 1 tablet by mouth daily       levothyroxine (SYNTHROID/LEVOTHROID) 50 MCG tablet Take 50 mcg by mouth daily.       metFORMIN (GLUCOPHAGE XR) 500 MG 24 hr tablet Take 1,000 mg by mouth 2 times daily (with meals).       zolpidem (AMBIEN) 10 MG tablet Take 10 mg by mouth at bedtime.                   STOP taking these medications         carvedilol (COREG) 3.125 MG tablet Comments:   Reason for Stopping:            losartan (COZAAR) 100 MG tablet Comments:   Reason for Stopping:            predniSONE (DELTASONE) 20 MG tablet Comments:   Reason for Stopping:                             Rationale for medication changes:                   Consults         CORE CLINIC EVALUATION IP CONSULT  OCCUPATIONAL THERAPY ADULT IP CONSULT  CARDIOLOGY IP CONSULT  NEPHROLOGY IP CONSULT  PHARMACY LIAISON FOR MEDICATION COVERAGE CONSULT     Immunizations given this encounter           Most Recent Immunizations   Administered Date(s) Administered    COVID-19 12+ (Pfizer) 09/18/2023    COVID-19 Bivalent 18+ (Moderna) 04/25/2023    COVID-19 Monovalent 18+ (Moderna) 04/02/2022    Flu 65+ (Fluad) 09/23/2020    Flu, Unspecified 10/25/2003    Hepatitis A (ADULT 19+) 06/25/1999    Hepatitis A Not Indicated - By Hx 06/25/1999     "Influenza (H1N1) 11/20/2009, 11/20/2009    Influenza (High Dose) Trivalent,PF (Fluzone) 10/08/2018    Influenza (IIV3) PF 09/15/2020    Influenza Vaccine 65+ (FLUAD) 10/03/2022    Influenza Vaccine 65+ (Fluzone HD) 09/15/2023    Influenza Vaccine, 6+MO IM (QUADRIVALENT W/PRESERVATIVES) 10/03/2019    Influenza, seasonal, injectable, PF 09/11/2009    Pneumo Conj 13-V (2010&after) 12/03/2014    Pneumococcal 20 valent Conjugate (Prevnar 20) 11/08/2023    Pneumococcal 23 valent 10/02/2013    RSV Vaccine (Arexvy) 09/15/2023    TD,PF 7+ (Tenivac) 08/13/2007    TDAP (Adacel,Boostrix) 09/04/2012    TDAP Vaccine (Adacel) 09/04/2012    Td (Adult), Adsorbed 08/13/2007    Zoster recombinant adjuvanted (SHINGRIX) 08/22/2019    Zoster vaccine, live 08/13/2007            Anticoagulation Information       Recent INR results: No results for input(s): \"INR\" in the last 168 hours.  Warfarin doses (if applicable) or name of other anticoagulant:         SIGNIFICANT IMAGING FINDINGS            Results for orders placed or performed during the hospital encounter of 09/24/24   XR Chest Port 1 View     Impression     IMPRESSION: Lungs are clear. Heart and pulmonary vascularity are normal given projection. No signs of failure.     Interval placement of mid thoracic epidural leads.   US Renal Complete Non-Vascular     Impression     IMPRESSION:  1.  Slightly echogenic renal cortices again noted without evidence of hydronephrosis.  2.  Bilateral renal cysts.   Echocardiogram Complete   Result Value Ref Range     LVEF  60-65%           SIGNIFICANT LABORATORY FINDINGS      Most Recent 3 BMP's:            Recent Labs   Lab Test 09/25/24  1515 09/25/24  1306 09/25/24  0907 09/25/24  0656 09/24/24  1808 09/24/24  1432 09/24/24  1240   NA  --   --   --  141  --  140 138   POTASSIUM  --   --   --  4.1  --  5.3 5.2   CHLORIDE  --   --   --  104  --  103 102   CO2  --   --   --  25  --  21* 21*   BUN  --   --   --  56.3*  --  64.4* 63.8*   CR  --   --   " --  1.74*  --  2.07* 2.05*   ANIONGAP  --   --   --  12  --  16* 15   MARIO  --   --   --  8.8  --  9.0 8.7*   * 205* 127* 129*   < > 268* 341*    < > = values in this interval not displayed.               Discharge Orders              Reason for your hospital stay     dyspnea          Follow-up and recommended labs and tests      1.  Follow-up in cardiology clinic within the next 5 days.  Consider moving tomorrow's appointment to next week but if unable would recommend following up tomorrow.  You will need repeat basic metabolic profile to assess renal function within 5 days.  2.  Follow-up as scheduled with nephrology          Activity     Your activity upon discharge: activity as tolerated          Diet     Follow this diet upon discharge: Current Diet:Orders Placed This Encounter      Fluid restriction 2000 ML FLUID      Advance Diet as Tolerated: Clear Liquid Diet         Examination      Physical Exam  Temp:  [97.6  F (36.4  C)-98.2  F (36.8  C)] 98.1  F (36.7  C)  Pulse:  [50-72] 70  Resp:  [15-26] 16  BP: (166-199)/(72-78) 166/72  SpO2:  [94 %-97 %] 96 %      Wt Readings from Last 1 Encounters:   09/24/24 93.7 kg (206 lb 8 oz)         General: No apparent distress, sitting up in chair, comfortable and talkative  Heart: Regular rate rhythm     Please see EMR for more detailed significant labs, imaging, consultant notes etc.     ISridhar MD, personally saw the patient today and spent greater than 30 minutes discharging this patient.     Sridhar Valenzuela MD  [facility]     CC:Heidy Strickland

## 2024-09-25 NOTE — PROVIDER NOTIFICATION
Notified Resident at 2135 PM regarding  :  Pt c/o increased in SOB, requesting something for cough, and appearing anxious.     Spoke with: Dr. Reyna    Orders were obtained. See orders for chloraseptic, robitussin dm, and atarax.

## 2024-09-25 NOTE — PROGRESS NOTES
"PRIMARY DIAGNOSIS: \"GENERIC\" NURSING  OUTPATIENT/OBSERVATION GOALS TO BE MET BEFORE DISCHARGE:  ADLs back to baseline: Yes    Activity and level of assistance: Ambulating independently.    Pain status: Improved-controlled with oral pain medications.    Return to near baseline physical activity: Yes     Discharge Planner Nurse   Safe discharge environment identified: Yes  Barriers to discharge: Yes       Entered by: Eran Avila RN 09/25/2024 2:25 AM     Please review provider order for any additional goals.   Nurse to notify provider when observation goals have been met and patient is ready for discharge.    A&O x 4. Admitted for dyspnea on exertion. PIV access right upper forearm. Bed in low position, call light within reach. Non-slip footwear in use. Patient calls appropriately.   Visit Vitals  BP (!) 173/76 (BP Location: Left arm)   Pulse 63   Temp 97.7  F (36.5  C) (Oral)   Resp 22           "

## 2024-09-25 NOTE — CONSULTS
9/25- Test claim for Jardiance- covered medication $153 for 30 days supply (Farxiga $145) patient is in the Medicare gap/donut hole and this will be the cost until amount is met. Thank you for allowing me to help with your patient  Cheryl [facility]  Pharmacy Discharge Liaison  [place]

## 2024-09-25 NOTE — CONSULTS
RENAL CONSULT NOTE    REQUESTING PHYSICIAN: MD Nicolas    REASON FOR CONSULT: Worsening AFRICA     PLAN:  Would reintroduce ARB at least 50mg daily for BP  Ideal to start SGLT2i for cardio/renal protection  Decisions on diuretics depending on r heart cath results   Would ideally f/up in renal clinic to get established for regular maintenance of her renal dz   Oct 9th , Wednesday at 10 a.m. with Celestina Quintanilla.     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 More aggressive diuretics vs bradycardia.    CKD etiology has been considered multifactorial including Hypertensive nephrosclerosis, DMN, and advanced age.  No prior bx.   Hx of + microalbuminuria Hx.  Prior UAs bland. No RBC's, + WBCs/leuks (less likely UTI) with ketone/nitrate neg, but ck UC  Renal US WNL, no hydro or calculi.    HTN:   PTA on Amlodipine 10 mg daily , and losartan 100 mg Daily (would like to resume at least 1/2 dose soon), Coreg 3.125 BID  She is quite HTNive, ARB and BB held on admission with AFRICA and bradycardia.  Despite AFRICA, improving, would resume ARB as she is not far from her baseline     Anemia: last Hg 13.7. CBC pending.      HFpEF:  ECHO normal LVEF 60-65%, mild LVH, moderate hemodynamically sig  AoS, cards consulted  Inc in LE edema with recent inc CCB, and ? Steroids exacerbating  PTA Coreg 3.125 BID, Lasix 40, Losartan 100  Would benefit from SGLT2i introduction, once RF stable.  CXR without evidence of volume overload  Follows in HF clinic, with Dr Holman as primary card    Trops low and stable      DM2:   Management per [facility]. HOLD metformin if GFR <30  With DMN and neuropathy  Follows with podiatry, last 9/19  A1c 5.8%     GERD:   pepcid PTA     Gout:   on allopurinol     HLD:   on statin     Asthma:   With ? Acute exacerbation, on oral steroid burst per pulm with pulm 40mg x 7 days, started 9/20   PTA  inhalers, Nebs  Follows with [facility] pulm outpt, last seen      H/o  L3-4 Spinal surgery           HPI: Katt Dsouza is a 84 year old female admitted with CP And SOB, and wt gain/edema.  She is new to me, but has had limited inpt visits by our group in .  Admitted with worsening edema, gen malaise and chest discomfort with SOB .  Reports worsening SOB for a few weeks, had her Furosemide inc from 40-->80 with cards, but reduced d/t worsening RF.  She has sustained about a 8lbs wt gain since her last cards visit.  Was started on oral Pred 40 x 5 days per pulm for SOB (I suspect his plus her Amlod dose inc contributing to worsening edema and fluid gains).   Discussed in tandem in with [facility] provider.  Plans for R heart cath to further eval true volume status.  She does have edema, but chest imaging is unremarkable.    PMHX sigg for HrEF, HTN, Asthma, gout, neuropathy, hypothyroidism, DM2.     REVIEW OF SYSTEMS:  See HPI       Past Medical History:   Diagnosis Date    Diabetes (H)     Hypertension     Uncomplicated asthma                Social History     Tobacco Use    Smoking status: Former     Current packs/day: 0.00     Types: Cigarettes     Quit date: 1995     Years since quittin.7    Smokeless tobacco: Never   Vaping Use    Vaping status: Never Used   Substance Use Topics    Alcohol use: Yes     Comment: rare    Drug use: Never          Current Facility-Administered Medications   Medication Dose Route Frequency Provider Last Rate Last Admin    - MEDICATION INSTRUCTIONS -   Does not apply DOES NOT GO TO Catia Johnson NP        albuterol (PROVENTIL HFA/VENTOLIN HFA) inhaler  2 puff Inhalation Q4H PRN Catia Mcdonald NP   2 puff at 24 0140    allopurinol (ZYLOPRIM) tablet 100 mg  100 mg Oral Daily LabolCatia horan NP   100 mg at 24 09    amLODIPine (NORVASC) tablet 10 mg  10 mg Oral At Bedtime LabCatia sotelo NP   10 mg at 24    atorvastatin (LIPITOR) tablet  20 mg  20 mg Oral At Bedtime LabolCatia horan NP   20 mg at 09/24/24 2253    benzocaine-menthol (CHLORASEPTIC) 6-10 MG lozenge 1 lozenge  1 lozenge Buccal Q1H PRN Von Reyna MD        [Held by provider] carvedilol (COREG) tablet 3.125 mg  3.125 mg Oral BID w/meals LabolCatia horan NP        glucose gel 15-30 g  15-30 g Oral Q15 Min PRN LabolCatia horan NP        Or    dextrose 50 % injection 25-50 mL  25-50 mL Intravenous Q15 Min PRN Catia Mcdonald NP        Or    glucagon injection 1 mg  1 mg Subcutaneous Q15 Min PRN Catia Mcdonald NP        fluticasone (FLONASE) 50 MCG/ACT spray 2 spray  2 spray Both Nostrils BID PRN Ferdinand Andrews MD   2 spray at 09/25/24 0923    fluticasone-vilanterol (BREO ELLIPTA) 100-25 MCG/ACT inhaler 1 puff  1 puff Inhalation Daily LabCatia sotelo NP   1 puff at 09/25/24 0922    And    umeclidinium (INCRUSE ELLIPTA) 62.5 MCG/ACT inhaler 1 puff  1 puff Inhalation Daily LabolCatia horan NP   1 puff at 09/25/24 0923    [Held by provider] furosemide (LASIX) tablet 40 mg  40 mg Oral Daily LabolCatia horan NP        gabapentin (NEURONTIN) capsule 300 mg  300 mg Oral TID LabolCatia horan NP   300 mg at 09/25/24 0921    heparin ANTICOAGULANT injection 5,000 Units  5,000 Units Subcutaneous Q8H LabCatia sotelo NP   5,000 Units at 09/25/24 0633    hydrALAZINE (APRESOLINE) injection 10 mg  10 mg Intravenous Q6H PRN Malka Payton MD   10 mg at 09/24/24 2054    insulin aspart (NovoLOG) injection (RAPID ACTING)  1-3 Units Subcutaneous TID AC LabCatia sotelo NP   1 Units at 09/24/24 1811    insulin aspart (NovoLOG) injection (RAPID ACTING)  1-3 Units Subcutaneous At Bedtime Labolt, Catia K, NP   1 Units at 09/24/24 6275    levothyroxine (SYNTHROID/LEVOTHROID) tablet 50 mcg  50 mcg Oral Daily Catia Mcdonald NP   50 mcg at 09/25/24 0921    [Held by provider] losartan (COZAAR) tablet 100 mg  100 mg Oral Daily Catia Mcdonald NP         ondansetron (ZOFRAN ODT) ODT tab 4 mg  4 mg Oral Q6H PRN Catia Mcdonald NP        Or    ondansetron (ZOFRAN) injection 4 mg  4 mg Intravenous Q6H PRN Catia Mcdonald NP        Reason ACE/ARB/ARNI order not selected   Other DOES NOT GO TO Catia Johnson NP        Reason beta blocker not prescribed   Other DOES NOT GO TO Catia Johnson NP        senna-docusate (SENOKOT-S/PERICOLACE) 8.6-50 MG per tablet 1 tablet  1 tablet Oral BID PRN Catia Mcdonald NP        Or    senna-docusate (SENOKOT-S/PERICOLACE) 8.6-50 MG per tablet 2 tablet  2 tablet Oral BID PRN Catia Mcdonald NP        zolpidem (AMBIEN) tablet 10 mg  10 mg Oral At Bedtime Catia Mcdonald NP   10 mg at 09/24/24 0152         ALLERGIES/SENSITIVITIES:  Allergies   Allergen Reactions    Animal Dander Unknown    Penicillins Hives    Sulfa Antibiotics Hives          PHYSICAL EXAM:  BP (!) 168/72   Pulse 57   Temp 98.2  F (36.8  C) (Oral)   Resp 19   Wt 93.7 kg (206 lb 8 oz)   SpO2 97%   BMI 35.45 kg/m      Patient Vitals for the past 72 hrs:   Weight   09/24/24 1214 93.7 kg (206 lb 8 oz)     Body mass index is 35.45 kg/m .    Intake/Output Summary (Last 24 hours) at 9/25/2024 1121  Last data filed at 9/25/2024 0930  Gross per 24 hour   Intake 360 ml   Output --   Net 360 ml         General - A & O x 3, NAD, sitting up in chair, on RA, pleasant and interactive  HEENT - PERRLA, no scleral icterus  Neck - no carotid bruits, no obvious JVD  Respiratory - rales L base   Cardiovascular - BP escalated 160's, rate 50'  Abdomen - soft, BS present, no bruits, no obvious  fluid wave  Extremities - 1-2+ edema  Integumentary - intact, good turgor, no rash/lesions  Neurologic - grossly intact  Psych:  Judgement intact, affect WNL  :  voiding, not all captured.     Laboratory:  Recent Labs   Lab Test 09/25/24  0656 09/24/24  1240 05/19/22  1428 01/28/22  1831   WBC 10.9 11.9*   < > 9.9   HGB 11.6* 11.8   < > 12.1   MCV 96 96   < >  91    324   < > 356   INR  --   --   --  1.01    < > = values in this interval not displayed.      Recent Labs   Lab Test 09/25/24  0656 09/24/24  1432   POTASSIUM 4.1 5.3   CHLORIDE 104 103   BUN 56.3* 64.4*      Recent Labs   Lab Test 09/24/24  1902 05/28/24  1512 11/08/23  1215 11/17/22  1212 11/09/22  1355   ALBUMIN  --  4.0 3.9   < >  --    BILITOTAL  --  0.2 0.3   < >  --    ALT  --  18 15   < >  --    AST  --  24 17   < >  --    PROTEIN 70*  --   --   --  Negative    < > = values in this interval not displayed.       Personally reviewed today's laboratory studies      Thank you for involving us in the care of this patient. We will continue to follow along with you.      Ashli Cabrera, MALA-BC  [facility]  [phone]

## 2024-09-25 NOTE — PROVIDER NOTIFICATION
Notified Resident at 0037 AM regarding  :  Pt requesting her home flonase to be restarted. Can she have that? Thanks!    Spoke with: Dr. Reyna    Orders were not obtained. Call cross cover.        Update 0043: Paged cross cover Dr. Andrews. Pt's requesting her home flonase to be restarted here. Can she have that? Thanks!    Received orders from Dr. Andrews for flonase.

## 2024-09-25 NOTE — SIGNIFICANT EVENT
Significant Event Note    Time of event: 9:34 PM September 24, 2024    Description of event:    Patient feels short of breath and more chest pain    VSS  General: Appears very anxious  Chest pain Reproducible on palpation  Lungs clear    BNP elevated, although CXR shows no signs of effusion    This might be partly anxiety induced.  She does have a history of anxiety with panic attacks, has been on medication but isn't on anything currently.  Sounds like the ED was very stressful for her.    Plan:  Repeat EKG, troponin  If ongoing feeling short of breath in spite of normal testing and VSS, will consider hydroxyzine    Discussed with: bedside nurse    Von Reyna MD

## 2024-09-25 NOTE — PRE-PROCEDURE
GENERAL PRE-PROCEDURE:   Procedure:  Right heart catheterization  Date/Time:  9/25/2024 1:18 PM    Written consent obtained?: Yes    Risks and benefits: Risks, benefits and alternatives were discussed    Consent given by:  Patient  Patient states understanding of procedure being performed: Yes    Patient's understanding of procedure matches consent: Yes    Procedure consent matches procedure scheduled: Yes    Expected level of sedation:  Moderate  Appropriately NPO:  Yes  ASA Class:  4 (Elevated troponin, HFpEF, mild AS, HTN, HLD, bradycardia, AFRICA on CKD stage IIIa, asthma, probable EFRAIN, class II obesity; BMI 35.40 kg/m2)  Mallampati  :  Grade 2- soft palate, base of uvula, tonsillar pillars, and portion of posterior pharyngeal wall visible  Lungs:  Lungs clear with good breath sounds bilaterally  Heart:  Normal heart sounds and rate  History & Physical reviewed:  History and physical reviewed and updates made (see comment)  H&P Comments:  Clinically Significant Risk Factors Present on Admission    Cardiovascular : Elevated troponin, HFpEF, mild AS, HTN, HLD, bradycardia, class II obesity; BMI 35.40 kg/m2    Fluid & Electrolyte Disorders : Not present on admission    Gastroenterology : Not present on admission    Hematology/Oncology : Not present on admission    Nephrology : Acute kidney failure on CKD Stage IIIa; in the setting HFpEF    Neurology : Not present on admission    Pulmonology : AFRICA on CKD stage IIIa, asthma, probable EFRAIN    Systemic : Class II obesity; BMI 35.40 kg/m2  Statement of review:  I have reviewed the lab findings, diagnostic data, medications, and the plan for sedation

## 2024-09-26 ENCOUNTER — TELEPHONE (OUTPATIENT)
Dept: CARDIOLOGY | Facility: CLINIC | Age: 84
End: 2024-09-26

## 2024-09-26 ENCOUNTER — OFFICE VISIT (OUTPATIENT)
Dept: PULMONOLOGY | Facility: CLINIC | Age: 84
End: 2024-09-26
Payer: MEDICARE

## 2024-09-26 VITALS
DIASTOLIC BLOOD PRESSURE: 72 MMHG | WEIGHT: 198.4 LBS | SYSTOLIC BLOOD PRESSURE: 148 MMHG | OXYGEN SATURATION: 98 % | BODY MASS INDEX: 34.06 KG/M2 | HEART RATE: 62 BPM

## 2024-09-26 DIAGNOSIS — J31.0 CHRONIC RHINITIS: ICD-10-CM

## 2024-09-26 DIAGNOSIS — I50.32 CHRONIC DIASTOLIC (CONGESTIVE) HEART FAILURE (H): ICD-10-CM

## 2024-09-26 DIAGNOSIS — J45.40 MODERATE PERSISTENT ASTHMA WITHOUT COMPLICATION: Primary | ICD-10-CM

## 2024-09-26 LAB — BACTERIA UR CULT: NORMAL

## 2024-09-26 PROCEDURE — 99214 OFFICE O/P EST MOD 30 MIN: CPT | Performed by: NURSE PRACTITIONER

## 2024-09-26 NOTE — PROGRESS NOTES
Patient oxygen saturation on RA at rest is 98%.  Oxygen saturation after ambulating 400ft on RA is 94%.    After ambulating ft on LPM oxygen saturation is .    Patient is ambulatory within his/her home.     Carlo Sanchez MA  [facility]

## 2024-09-26 NOTE — TELEPHONE ENCOUNTER
[facility] Call Center    Phone Message    May a detailed message be left on voicemail: yes     Reason for Call: Other: Katt called requesting to speak with her care team about her hospital visit and how she should follow up with her team. Katt is scheduled to see Dr. Salinas at the end of October but would like to speak with her team in the meantime. Please reach out to Katt to discuss. Thank you!     Action Taken: Other: Cardiology    Travel Screening: Not Applicable    Thank you!  [facility]       Date of Service:

## 2024-09-26 NOTE — PROGRESS NOTES
Pulmonary Clinic Follow-Up        Assessment/Plan:     Katt Dsouza is a 84 year old former smoker with history of asthma, diastolic heart failure, obesity, presenting today for asthma follow-up.    Moderate persistent asthma  Seasonal allergies  Allergic rhinitis  Spirometry ratio is borderline reduced with reduced FEV1 which may suggest early obstruction, not consistent with reversibility.  Also with air-trapping and diffusion capacity is mildly reduced  Flow volume loops demonstrate mild scooping of expiratory limb suggestive of obstruction.  She presented to clinic last week with SOB, no improvement with steroid burst.  She had recent change to her diuretic regimen d/t increase in creatinine, and had weight gain and LE edema increased.  We advised to her to present to ED or cardiac rapid access clinic.  She was hospitalized with acute on chronic heart failure.  She is feeling improved after discharge, she will be seeing cardiology in a few days.  Walk test in clinic without oxygen desaturations today.    Plan:  - continue Trelegy until she returns from [place], then discuss decreasing regiment to Symbicort.  - continue Albuterol inhaler PRN  - follow-up with cardiology and have post-hospitalization labs done as ordered.  - she is UTD with pneumococcal vaccines and RSV vaccine.  Recommended for this fall:  annual influenza vaccine, COVID booster.  - Action plan: prednisone 40mg x7 days.      Follow-up  - in 9 months, when she returns from [place]    Batsheva Huerta CNP  Pulmonary Medicine  [facility]  [phone]           CC:     Asthma follow-up     HPI:     Katt Dsouza is a 84 year old former smoker with history of asthma, diastolic heart failure, obesity, presenting today for asthma follow-up.    Was in hospital, changed medications around.  Ankle edema is improving.  She is wondering about switching from Trelegy, due to cost.  She is working with cardiology clinic on getting a  sooner appt, was supposed to be seen within 5 days from hospital discharge.     ROS:     10-point ROS performed and is negative aside from those listed in HPI.         Medical history:     Past Medical History:   Diagnosis Date    Aortic valve disease     Diabetes (H)     Heart failure (H)     Hypertension     Uncomplicated asthma      Past Surgical History:   Procedure Laterality Date    BACK SURGERY      CV RIGHT HEART CATH MEASUREMENTS RECORDED N/A 2024    Procedure: Right Heart Catheterization;  Surgeon: Maxx Prather MD;  Location: [facility] CATH LAB CV    LAMINECTOMY LUMBAR ONE LEVEL Bilateral 2022    Procedure: LUMBAR 3 - LUMBAR 4 BILATERAL MEDIAL FACETECTOMY, DECOMPRESSION PARTIAL DISCECTOMY;  Surgeon: Leno Recinos MD;  Location: [facility] Main OR     Social History     Tobacco Use    Smoking status: Former     Current packs/day: 0.00     Types: Cigarettes     Quit date: 1995     Years since quittin.7    Smokeless tobacco: Never   Vaping Use    Vaping status: Never Used   Substance Use Topics    Alcohol use: Yes     Comment: rare    Drug use: Never     Current Outpatient Medications   Medication Sig Dispense Refill    albuterol (PROAIR HFA/PROVENTIL HFA/VENTOLIN HFA) 108 (90 Base) MCG/ACT inhaler Inhale 2 puffs into the lungs every 4 hours as needed for shortness of breath or wheezing 8.5 g 11    allopurinoL (ZYLOPRIM) 100 MG tablet [ALLOPURINOL (ZYLOPRIM) 100 MG TABLET] Take 100 mg by mouth daily.      amLODIPine (NORVASC) 10 MG tablet Take 1 tablet (10 mg) by mouth daily 90 tablet 3    atorvastatin (LIPITOR) 20 MG tablet [ATORVASTATIN (LIPITOR) 20 MG TABLET] Take 20 mg by mouth at bedtime.      fluticasone (FLONASE) 50 MCG/ACT nasal spray Spray 2 sprays into both nostrils 2 times daily as needed for rhinitis or allergies.      Fluticasone-Umeclidin-Vilant (TRELEGY ELLIPTA) 100-62.5-25 MCG/ACT oral inhaler Inhale 1 puff into the lungs daily 60 each 11    gabapentin  (NEURONTIN) 300 MG capsule Take 600 mg by mouth 2 times daily.      LASIX 40 MG tablet Take 1 tablet by mouth daily      levothyroxine (SYNTHROID/LEVOTHROID) 50 MCG tablet Take 50 mcg by mouth daily.      metFORMIN (GLUCOPHAGE XR) 500 MG 24 hr tablet Take 1,000 mg by mouth 2 times daily (with meals).      zolpidem (AMBIEN) 10 MG tablet Take 10 mg by mouth at bedtime.       No current facility-administered medications for this visit.      Physical Exam:     BP (!) 152/80 (BP Location: Left arm, Patient Position: Sitting, Cuff Size: Adult Regular)   Pulse 62   Wt 90 kg (198 lb 6.4 oz)   SpO2 98%   BMI 34.06 kg/m    Gen: elderly female, appears in NAD  HEENT: clear conjunctivae, moist mucous membranes  CV: RRR, + murmur  Resp: CTAB, no focal crackles or wheezes.  Respirations even and unlabored.  On RA.  No cough.  Skin: no apparent rashes on visible skin  Ext: no cyanosis, clubbing or edema  Neuro: alert and answering questions appropriately     Data:     EXAM: XR CHEST PORT 1 VIEW  LOCATION: [facility]  DATE: 9/24/2024     INDICATION: chest pain  COMPARISON: 9/11/2023 and older studies                                                                      IMPRESSION: Lungs are clear. Heart and pulmonary vascularity are normal given projection. No signs of failure.     Interval placement of mid thoracic epidural leads.      EXAM: XR CHEST 2 VW  LOCATION: [facility]  DATE/TIME: 1/28/2022   INDICATION: Shortness of breath  COMPARISON: 5/3/2021                                                 IMPRESSION: No change. Heart size upper limits of normal. Lungs hyperinflated suggesting COPD. No focal pneumonia.      PFTs 2019:  Testing met ATS standards with the exception of lung volumes/washout - did not meet repeatability standards.  FEV1/FVC is 68 and is normal per LLN of 64.  FEV1 is 69% predicted and is reduced.  FVC is 78% predicted and reduced.  There was no improvement  in spirometry after a single inhaled does of bronchodilator.  TLC is 108% predicted and is normal.  RV is 145% predicted and is increased.  DLCO is 62% predicted and is reduced when it   is corrected for hemoglobin.  Impression:  Full Pulmonary Function Test is abnormal.    Spirometry ratio is borderline reduced (less than 70 but normal per LLN for age) with reduced FEV1 which may suggest early obstruction. It is not consistent with reversibility.  There is no hyperinflation.  There is air-trapping.  Diffusion capacity is mildly reduced  Flow volume loops demonstrate mild scooping of expiratory limb suggestive of obstruction.

## 2024-09-26 NOTE — PATIENT INSTRUCTIONS
It was a pleasure to see you in clinic today.   Here is what we discussed:    Continue Trelegy Ellipta one inhalation daily, rinse/gargle after use.   Continue Albuterol every 4-6 hours as needed for shortness of breath or wheezing.  Start saline spray twice daily, blow nose afterwards, then do medicated nasal spray.   Recommended for this fall:  annual influenza vaccine, COVID booster.  Call my nurse, Brenden ([phone]) with any change or worsening of your breathing.  Follow-up in 9 months.    Batsheva Huerta, CNP  Pulmonary Medicine  [facility]  [phone]

## 2024-09-26 NOTE — TELEPHONE ENCOUNTER
Spoke with pt to let her know that I have sent Lottie Murillo her information to get guidance/recommendations for follow-up post hospitalization.     Pt currently at pulmonologist but very appreciative of call. Assured her we would be in touch once I heard back from Lottie. jose

## 2024-09-30 LAB
ATRIAL RATE - MUSE: 214 BPM
DIASTOLIC BLOOD PRESSURE - MUSE: NORMAL MMHG
INTERPRETATION ECG - MUSE: NORMAL
P AXIS - MUSE: NORMAL DEGREES
PR INTERVAL - MUSE: NORMAL MS
QRS DURATION - MUSE: 132 MS
QT - MUSE: 456 MS
QTC - MUSE: 432 MS
R AXIS - MUSE: -50 DEGREES
SYSTOLIC BLOOD PRESSURE - MUSE: NORMAL MMHG
T AXIS - MUSE: 15 DEGREES
VENTRICULAR RATE- MUSE: 54 BPM

## 2024-10-02 ENCOUNTER — APPOINTMENT (OUTPATIENT)
Dept: CARDIOLOGY | Facility: CLINIC | Age: 84
End: 2024-10-02
Payer: MEDICARE

## 2024-10-02 ENCOUNTER — OFFICE VISIT (OUTPATIENT)
Dept: CARDIOLOGY | Facility: CLINIC | Age: 84
End: 2024-10-02
Attending: INTERNAL MEDICINE
Payer: MEDICARE

## 2024-10-02 VITALS
BODY MASS INDEX: 32.49 KG/M2 | HEART RATE: 76 BPM | WEIGHT: 195 LBS | HEIGHT: 65 IN | DIASTOLIC BLOOD PRESSURE: 82 MMHG | RESPIRATION RATE: 16 BRPM | SYSTOLIC BLOOD PRESSURE: 150 MMHG

## 2024-10-02 DIAGNOSIS — I50.9 ACUTE DECOMPENSATED HEART FAILURE (H): ICD-10-CM

## 2024-10-02 DIAGNOSIS — M79.89 LEG SWELLING: ICD-10-CM

## 2024-10-02 DIAGNOSIS — I50.32 CHRONIC DIASTOLIC CONGESTIVE HEART FAILURE (H): Primary | ICD-10-CM

## 2024-10-02 DIAGNOSIS — R06.02 SHORTNESS OF BREATH: ICD-10-CM

## 2024-10-02 DIAGNOSIS — I10 BENIGN ESSENTIAL HYPERTENSION: ICD-10-CM

## 2024-10-02 LAB
ANION GAP SERPL CALCULATED.3IONS-SCNC: 12 MMOL/L (ref 7–15)
BUN SERPL-MCNC: 20.5 MG/DL (ref 8–23)
CALCIUM SERPL-MCNC: 8.9 MG/DL (ref 8.8–10.4)
CHLORIDE SERPL-SCNC: 105 MMOL/L (ref 98–107)
CREAT SERPL-MCNC: 1.48 MG/DL (ref 0.51–0.95)
EGFRCR SERPLBLD CKD-EPI 2021: 35 ML/MIN/1.73M2
GLUCOSE SERPL-MCNC: 87 MG/DL (ref 70–99)
HCO3 SERPL-SCNC: 26 MMOL/L (ref 22–29)
NT-PROBNP SERPL-MCNC: 1341 PG/ML (ref 0–1800)
POTASSIUM SERPL-SCNC: 4.2 MMOL/L (ref 3.4–5.3)
SODIUM SERPL-SCNC: 143 MMOL/L (ref 135–145)

## 2024-10-02 PROCEDURE — 83880 ASSAY OF NATRIURETIC PEPTIDE: CPT | Performed by: INTERNAL MEDICINE

## 2024-10-02 PROCEDURE — 99205 OFFICE O/P NEW HI 60 MIN: CPT | Performed by: INTERNAL MEDICINE

## 2024-10-02 PROCEDURE — G2211 COMPLEX E/M VISIT ADD ON: HCPCS | Performed by: INTERNAL MEDICINE

## 2024-10-02 PROCEDURE — 80048 BASIC METABOLIC PNL TOTAL CA: CPT | Performed by: INTERNAL MEDICINE

## 2024-10-02 PROCEDURE — 36415 COLL VENOUS BLD VENIPUNCTURE: CPT | Performed by: INTERNAL MEDICINE

## 2024-10-02 RX ORDER — LOSARTAN POTASSIUM 100 MG/1
25 TABLET ORAL DAILY
COMMUNITY

## 2024-10-02 NOTE — PROGRESS NOTES
HEART CARE NOTE        Thank you, Dr. Strickland, for asking the [facility] Heart Care team to see Katt HORTENCIA Dsouza to evaluate HFpEF.    Assessment/Recommendations     1. HFpEF c/b severe ADHF 2/2 valvular heart disease  Assessment / Plan  Near euvolemia; denies HF symptoms of orthopnea, PND, edema - no changes to regimen at this time  Patient is high risk for adverse cardiac events 2/2 advanced age, frailty, renal dysfunction, HTN, DM2, valvular disease   GDMT as detailed below; mainstay of treatment for HFpEF includes diuretics and adequate BP control (class I) and SGLT2-I (class 2a); additional medical therapy (ARNI, MRA, ARB) demonstrated less robust evidence for indication but may be considered per guideline recommendations (2b); no indication for BBlockers   Recommend titration of ARB if renal function allows followed by discussion about/addition of SGTL2-I at future [facility] appointments     Current Pharmacotherapy AHA Guideline-Directed Medical Therapy   Losartan  25 mg daily ARNI/ARB   Spironolactone not started  MRA   SGLT2 inhibitor: Dapagliflozin 10 mg daily  SGLT2-I    Furosemide 40 mg daily Loop diuretic      2. [place]FR[place] on CKD stage 4  Assessment / Plan  Resolving - repeat BMP ordered    3. DM2  Assessment / Plan  Non-insulin dependent; Management per PMD    4. HTN  Assessment / Plan  BMP results to determine further titrations of ARB     5. HLP  Assessment / Plan  Currently on atorvastatin    6. ?EFRAIN  Assessment / Plan  Patient reports excessive daytime fatigue, requiring naps - referral placed to sleep study    7. Valvular heart disease  Assessment / Plan  Moderate aortic stenosis - recommend routine interval imaging in 3 months    65 minutes spent reviewing prior records (including documentation, laboratory studies, cardiac testing/imaging), history and physical exam, planning, and subsequent documentation.    The longitudinal plan of care for HFpEF was addressed during this visit. Due to the added complexity  "in care, I will continue to supportMs. Katt Dsouza  in the subsequent management of this condition(s) and with the ongoing continuity of care of this condition(s).    History of Present Illness/Subjective    Ms. Katt Dsouza is a 84 year old female with a PMHx significant for (per Epic notation) acute on chronic heart failure in the context of aortic stenosis, CKD, HTN, DM2 who presents to [facility] to establish care.    Of note, patient was recently admitted for CHF exacerbation from 9/24-9/25/24    Today, Mrs. Dsouza denies acute cardiac events or complaints although she does report symptoms of feeling \" off, weird)     We discussed HF diet and lifestyle modifications including the 2 g Na and 2L fluid restrictions. She does not currently adhere, but will do so moving forward. Patient was provided with the HF educational binder as well as a book to log his daily weights as well as HF education by our [facility] RN     ECG: Personally reviewed. normal sinus rhythm, nonspecific ST and T waves changes, RBBB, left axis deviation.    ECHO (personnaly Reviewed on 10/2/24):   1.Left ventricular size, wall motion and function are normal. The ejection  fraction is 60-65%.  2.There is mild concentric left ventricular hypertrophy.  3.Normal right ventricle size and systolic function.  4.The left atrium is moderately dilated.  5.There is mild mitral annular calcification.There is mild mitral stenosis.The  mean mitral valve gradient is 4mmHg.  6.Moderate valvular aortic stenosis. At SVI 58 mL/M2 dimensionless index 0.51,  peak velocity 3.4 m/s with a mean gradient of 23 mmHg, there is also aortic  insufficiency mild to moderate with deceleration pressure half-time of 422 ms.  7.Hemodynamically significant valve disease is identified. Recommend referral  to valve clinic for further evaluation. Valve clinic phone number: [phone]. [facility]: [facility] Valve Clinic [facility].  There is no comparison study " "available.    Lab results: personally reviewed October 2, 2024; notable for resolving AFRICA    Medical history and pertinent documents reviewed in Care Everywhere please where applicable see details above          Physical Examination Review of Systems   BP (!) 150/82 (BP Location: Left arm, Patient Position: Sitting, Cuff Size: Adult Regular)   Pulse 76   Resp 16   Ht 1.651 m (5' 5\")   Wt 88.5 kg (195 lb)   BMI 32.45 kg/m    Body mass index is 32.45 kg/m .  Wt Readings from Last 3 Encounters:   10/02/24 88.5 kg (195 lb)   09/26/24 90 kg (198 lb 6.4 oz)   09/24/24 93.7 kg (206 lb 8 oz)     General Appearance:   no distress, normal body habitus   ENT/Mouth: membranes moist, no oral lesions or bleeding gums.      EYES:  no scleral icterus, normal conjunctivae   Neck: no carotid bruits or thyromegaly   Chest/Lungs:   lungs are clear to auscultation, no rales or wheezing, equal chest wall expansion    Cardiovascular:   Regular. Normal first and second heart sounds with +JAD; no rubs, or gallops; the carotid, radial and posterior tibial pulses are intact, no JVD or LE edema bilaterally    Abdomen:  no organomegaly, masses, bruits, or tenderness; bowel sounds are present   Extremities: no cyanosis or clubbing   Skin: no xanthelasma, warm.    Neurologic: NAD     Psychiatric: alert and oriented x3, calm     A complete 10 systems ROS was reviewed  And is negative except what is listed in the HPI.          Medical History  Surgical History Family History Social History   Past Medical History:   Diagnosis Date    Aortic valve disease     Diabetes (H)     Heart failure (H)     Hypertension     Uncomplicated asthma     Past Surgical History:   Procedure Laterality Date    BACK SURGERY      CV RIGHT HEART CATH MEASUREMENTS RECORDED N/A 9/25/2024    Procedure: Right Heart Catheterization;  Surgeon: Maxx Prather MD;  Location: [facility] CATH LAB CV    LAMINECTOMY LUMBAR ONE LEVEL Bilateral 11/8/2022    Procedure: LUMBAR 3 - " LUMBAR 4 BILATERAL MEDIAL FACETECTOMY, DECOMPRESSION PARTIAL DISCECTOMY;  Surgeon: Leno Recinos MD;  Location: [facility] Main OR    no family history of premature coronary artery disease Social History     Socioeconomic History    Marital status:      Spouse name: Not on file    Number of children: Not on file    Years of education: Not on file    Highest education level: Not on file   Occupational History    Not on file   Tobacco Use    Smoking status: Former     Current packs/day: 0.00     Types: Cigarettes     Quit date: 1995     Years since quittin.7    Smokeless tobacco: Never   Vaping Use    Vaping status: Never Used   Substance and Sexual Activity    Alcohol use: Yes     Comment: rare    Drug use: Never    Sexual activity: Not on file   Other Topics Concern    Not on file   Social History Narrative    Not on file     Social Determinants of Health     Financial Resource Strain: Not on file   Food Insecurity: Not on file   Transportation Needs: Not on file   Physical Activity: Not on file   Stress: Not on file   Social Connections: Not on file   Interpersonal Safety: Not on file   Housing Stability: Not on file           Lab Results    Chemistry/lipid CBC Cardiac Enzymes/BNP/TSH/INR   Lab Results   Component Value Date    CHOL 166 2024    HDL 59 2024    TRIG 165 (H) 2024    BUN 56.3 (H) 2024     2024    CO2 25 2024    Lab Results   Component Value Date    WBC 10.9 2024    HGB 11.6 (L) 2024    HCT 35.1 2024    MCV 96 2024     2024    Lab Results   Component Value Date    TROPONINI 0.03 2022    BNP 78 2022    TSH 1.11 2024    INR 1.01 2022     Lab Results   Component Value Date    TROPONINI 0.03 2022          Weight:    Wt Readings from Last 3 Encounters:   24 90 kg (198 lb 6.4 oz)   24 93.7 kg (206 lb 8 oz)   24 90.7 kg (200 lb)        Allergies  Allergies   Allergen Reactions    Animal Dander Unknown    Penicillins Hives    Sulfa Antibiotics Hives         Surgical History  Past Surgical History:   Procedure Laterality Date    BACK SURGERY      CV RIGHT HEART CATH MEASUREMENTS RECORDED N/A 9/25/2024    Procedure: Right Heart Catheterization;  Surgeon: Maxx Prather MD;  Location: [facility] CATH LAB CV    LAMINECTOMY LUMBAR ONE LEVEL Bilateral 11/8/2022    Procedure: LUMBAR 3 - LUMBAR 4 BILATERAL MEDIAL FACETECTOMY, DECOMPRESSION PARTIAL DISCECTOMY;  Surgeon: Leno Recinos MD;  Location: [facility] Main OR       Social History  Tobacco:   History   Smoking Status    Former    Types: Cigarettes   Smokeless Tobacco    Never    Alcohol:   Social History    Substance and Sexual Activity      Alcohol use: Yes        Comment: rare   Illicit Drugs:   History   Drug Use Unknown       Family History  Family History   Problem Relation Age of Onset    Diabetes Type 2  Mother     Heart Failure Father           Brandee Salinas MD on 10/2/2024      cc: Heidy Strickland,

## 2024-10-02 NOTE — LETTER
10/2/2024    TONY Lara  [address] [address]    RE: Katt Dsouza       Dear Colleague,     I had the pleasure of seeing Katt Dsouza in the [facility].  HEART CARE NOTE        Thank you, Dr. Strickland, for asking the [facility] Heart Care team to see Katt Dsouza to evaluate HFpEF.    Assessment/Recommendations     1. HFpEF c/b severe ADHF 2/2 valvular heart disease  Assessment / Plan  Near euvolemia; denies HF symptoms of orthopnea, PND, edema - no changes to regimen at this time  Patient is high risk for adverse cardiac events 2/2 advanced age, frailty, renal dysfunction, HTN, DM2, valvular disease   GDMT as detailed below; mainstay of treatment for HFpEF includes diuretics and adequate BP control (class I) and SGLT2-I (class 2a); additional medical therapy (ARNI, MRA, ARB) demonstrated less robust evidence for indication but may be considered per guideline recommendations (2b); no indication for BBlockers   Recommend titration of ARB if renal function allows followed by discussion about/addition of SGTL2-I at future [facility] appointments     Current Pharmacotherapy AHA Guideline-Directed Medical Therapy   Losartan  25 mg daily ARNI/ARB   Spironolactone not started  MRA   SGLT2 inhibitor: Dapagliflozin 10 mg daily  SGLT2-I    Furosemide 40 mg daily Loop diuretic      2. [place] on CKD stage 4  Assessment / Plan  Resolving - repeat BMP ordered    3. DM2  Assessment / Plan  Non-insulin dependent; Management per PMD    4. HTN  Assessment / Plan  BMP results to determine further titrations of ARB     5. HLP  Assessment / Plan  Currently on atorvastatin    6. ?EFRAIN  Assessment / Plan  Patient reports excessive daytime fatigue, requiring naps - referral placed to sleep study    7. Valvular heart disease  Assessment / Plan  Moderate aortic stenosis - recommend routine interval imaging in 3 months    65 minutes spent reviewing prior records (including documentation, laboratory studies,  "cardiac testing/imaging), history and physical exam, planning, and subsequent documentation.    The longitudinal plan of care for HFpEF was addressed during this visit. Due to the added complexity in care, I will continue to supportMs. Katt Dsouza  in the subsequent management of this condition(s) and with the ongoing continuity of care of this condition(s).    History of Present Illness/Subjective    Ms. Katt Dsouza is a 84 year old female with a PMHx significant for (per Epic notation) acute on chronic heart failure in the context of aortic stenosis, CKD, HTN, DM2 who presents to [facility] to establish care.    Of note, patient was recently admitted for CHF exacerbation from 9/24-9/25/24    Today, Mrs. Dsouza denies acute cardiac events or complaints although she does report symptoms of feeling \" off, weird)     We discussed HF diet and lifestyle modifications including the 2 g Na and 2L fluid restrictions. She does not currently adhere, but will do so moving forward. Patient was provided with the HF educational binder as well as a book to log his daily weights as well as HF education by our [facility] RN     ECG: Personally reviewed. normal sinus rhythm, nonspecific ST and T waves changes, RBBB, left axis deviation.    ECHO (personnaly Reviewed on 10/2/24):   1.Left ventricular size, wall motion and function are normal. The ejection  fraction is 60-65%.  2.There is mild concentric left ventricular hypertrophy.  3.Normal right ventricle size and systolic function.  4.The left atrium is moderately dilated.  5.There is mild mitral annular calcification.There is mild mitral stenosis.The  mean mitral valve gradient is 4mmHg.  6.Moderate valvular aortic stenosis. At SVI 58 mL/M2 dimensionless index 0.51,  peak velocity 3.4 m/s with a mean gradient of 23 mmHg, there is also aortic  insufficiency mild to moderate with deceleration pressure half-time of 422 ms.  7.Hemodynamically significant valve disease is identified. " "Recommend referral  to valve clinic for further evaluation. Valve clinic phone number: [phone]. [facility] Epic: [facility] Valve Clinic [facility].  There is no comparison study available.    Lab results: personally reviewed October 2, 2024; notable for resolving AFRICA    Medical history and pertinent documents reviewed in Care Everywhere please where applicable see details above          Physical Examination Review of Systems   BP (!) 150/82 (BP Location: Left arm, Patient Position: Sitting, Cuff Size: Adult Regular)   Pulse 76   Resp 16   Ht 1.651 m (5' 5\")   Wt 88.5 kg (195 lb)   BMI 32.45 kg/m    Body mass index is 32.45 kg/m .  Wt Readings from Last 3 Encounters:   10/02/24 88.5 kg (195 lb)   09/26/24 90 kg (198 lb 6.4 oz)   09/24/24 93.7 kg (206 lb 8 oz)     General Appearance:   no distress, normal body habitus   ENT/Mouth: membranes moist, no oral lesions or bleeding gums.      EYES:  no scleral icterus, normal conjunctivae   Neck: no carotid bruits or thyromegaly   Chest/Lungs:   lungs are clear to auscultation, no rales or wheezing, equal chest wall expansion    Cardiovascular:   Regular. Normal first and second heart sounds with +JAD; no rubs, or gallops; the carotid, radial and posterior tibial pulses are intact, no JVD or LE edema bilaterally    Abdomen:  no organomegaly, masses, bruits, or tenderness; bowel sounds are present   Extremities: no cyanosis or clubbing   Skin: no xanthelasma, warm.    Neurologic: NAD     Psychiatric: alert and oriented x3, calm     A complete 10 systems ROS was reviewed  And is negative except what is listed in the HPI.          Medical History  Surgical History Family History Social History   Past Medical History:   Diagnosis Date     Aortic valve disease      Diabetes (H)      Heart failure (H)      Hypertension      Uncomplicated asthma     Past Surgical History:   Procedure Laterality Date     BACK SURGERY       CV RIGHT HEART CATH MEASUREMENTS RECORDED N/A " 2024    Procedure: Right Heart Catheterization;  Surgeon: Maxx Prather MD;  Location: [facility] LAB CV     LAMINECTOMY LUMBAR ONE LEVEL Bilateral 2022    Procedure: LUMBAR 3 - LUMBAR 4 BILATERAL MEDIAL FACETECTOMY, DECOMPRESSION PARTIAL DISCECTOMY;  Surgeon: Leno Recinos MD;  Location: [facility] Main OR    no family history of premature coronary artery disease Social History     Socioeconomic History     Marital status:      Spouse name: Not on file     Number of children: Not on file     Years of education: Not on file     Highest education level: Not on file   Occupational History     Not on file   Tobacco Use     Smoking status: Former     Current packs/day: 0.00     Types: Cigarettes     Quit date: 1995     Years since quittin.7     Smokeless tobacco: Never   Vaping Use     Vaping status: Never Used   Substance and Sexual Activity     Alcohol use: Yes     Comment: rare     Drug use: Never     Sexual activity: Not on file   Other Topics Concern     Not on file   Social History Narrative     Not on file     Social Determinants of Health     Financial Resource Strain: Not on file   Food Insecurity: Not on file   Transportation Needs: Not on file   Physical Activity: Not on file   Stress: Not on file   Social Connections: Not on file   Interpersonal Safety: Not on file   Housing Stability: Not on file           Lab Results    Chemistry/lipid CBC Cardiac Enzymes/BNP/TSH/INR   Lab Results   Component Value Date    CHOL 166 2024    HDL 59 2024    TRIG 165 (H) 2024    BUN 56.3 (H) 2024     2024    CO2 25 2024    Lab Results   Component Value Date    WBC 10.9 2024    HGB 11.6 (L) 2024    HCT 35.1 2024    MCV 96 2024     2024    Lab Results   Component Value Date    TROPONINI 0.03 2022    BNP 78 2022    TSH 1.11 2024    INR 1.01 2022     Lab Results   Component Value  Date    TROPONINI 0.03 01/28/2022          Weight:    Wt Readings from Last 3 Encounters:   09/26/24 90 kg (198 lb 6.4 oz)   09/24/24 93.7 kg (206 lb 8 oz)   09/20/24 90.7 kg (200 lb)       Allergies  Allergies   Allergen Reactions     Animal Dander Unknown     Penicillins Hives     Sulfa Antibiotics Hives         Surgical History  Past Surgical History:   Procedure Laterality Date     BACK SURGERY       CV RIGHT HEART CATH MEASUREMENTS RECORDED N/A 9/25/2024    Procedure: Right Heart Catheterization;  Surgeon: Maxx Prather MD;  Location: [facility] CATH LAB CV     LAMINECTOMY LUMBAR ONE LEVEL Bilateral 11/8/2022    Procedure: LUMBAR 3 - LUMBAR 4 BILATERAL MEDIAL FACETECTOMY, DECOMPRESSION PARTIAL DISCECTOMY;  Surgeon: Leno Recinos MD;  Location: [facility] Main OR       Social History  Tobacco:   History   Smoking Status     Former     Types: Cigarettes   Smokeless Tobacco     Never    Alcohol:   Social History    Substance and Sexual Activity      Alcohol use: Yes        Comment: rare   Illicit Drugs:   History   Drug Use Unknown       Family History  Family History   Problem Relation Age of Onset     Diabetes Type 2  Mother      Heart Failure Father           Brandee Salinas MD on 10/2/2024      cc: Heidy Strickland,       Thank you for allowing me to participate in the care of your patient.      Sincerely,     Brandee Salinas MD     [facility]  cc:   Brandee Salinas MD  [address] [address]

## 2024-10-04 ENCOUNTER — TELEPHONE (OUTPATIENT)
Dept: CARDIOLOGY | Facility: CLINIC | Age: 84
End: 2024-10-04
Payer: MEDICARE

## 2024-10-04 NOTE — TELEPHONE ENCOUNTER
Dr. Salinas and Dr. Holman, please review Nephrology recommendations:  Review with cardiology  Resumption of Losartan at 25 mg once daily  Adding in Farxiga 10 mg once daily (farxiga is better covered by insurance vs jardiance)     Agreeable to start both? Of note patient is leaving for [place] 10/31-5/2025. Has cardiologist in [place]. Advised to have follow-up with cardiology in 2 months, in December as per TSB office note. Thank you Eddie ALDANA   ______________________________________________________  PC with patient and discussion/review of labs on 10/2/24. Pt reports that the kidney specialist while in the hospital and this past Monday. Discussion to start Farxiga 10 mg for both kidney protection and heart. Has samples, and Rx was sent by kidney specialist. Ms. Cabrera also resumed Losartan 25 mg daily, after office visit this week. However, Nephrology wants cardiology to review and make sure agreeable. Patient has not started either at time of call, and will await cardiology input.     Asked if labs could be routed to Nephrology. Routed to Ms. Cabrera Nephrology as requested.     Routed encounter to providers for review, and will return call once any further discussion. CMM,Rn

## 2024-10-04 NOTE — TELEPHONE ENCOUNTER
[facility] Call Center    Phone Message    May a detailed message be left on voicemail: yes     Reason for Call: Other: Pt would like a call back to go over her lab results     Action Taken: Other: Cardio    Travel Screening: Not Applicable     Date of Service:

## 2024-10-07 NOTE — TELEPHONE ENCOUNTER
===View-only below this line===  ----- Message -----  From: Brandee Salinas MD  Sent: 10/7/2024   3:55 PM CDT  To: Mikal Crandall RN; *    I'm in agreement with starting both.    Thanks;  ~ Celine CALLAHAN to patient, no answer and LVM to return call. CMM,Rn

## 2024-10-07 NOTE — TELEPHONE ENCOUNTER
===View-only below this line===  ----- Message -----  From: David Holman DO  Sent: 10/7/2024   9:44 AM CDT  To: Mikal Crandall RN; Brandee Salinas MD    Will defer to Celine as she just evaluated patient on 10/2.  But starting both seem like a good treatment plan.

## 2024-10-08 NOTE — TELEPHONE ENCOUNTER
PC with patient and review of provider response. Will begin both medications. Clarifying with nephrologist, as to Farxiga vs Jardiance. Will move forward with whichever is recommended. No further questions at this time. Encouraged to call, if any new symptoms or concerns. Leaving for [place] before the end of the month. KATYA,RN

## 2024-10-16 ENCOUNTER — LAB REQUISITION (OUTPATIENT)
Dept: LAB | Facility: CLINIC | Age: 84
End: 2024-10-16
Payer: MEDICARE

## 2024-10-16 DIAGNOSIS — I50.9 HEART FAILURE, UNSPECIFIED (H): ICD-10-CM

## 2024-10-16 LAB
ANION GAP SERPL CALCULATED.3IONS-SCNC: 15 MMOL/L (ref 7–15)
BUN SERPL-MCNC: 33.9 MG/DL (ref 8–23)
CALCIUM SERPL-MCNC: 9.5 MG/DL (ref 8.8–10.4)
CHLORIDE SERPL-SCNC: 106 MMOL/L (ref 98–107)
CREAT SERPL-MCNC: 1.44 MG/DL (ref 0.51–0.95)
EGFRCR SERPLBLD CKD-EPI 2021: 36 ML/MIN/1.73M2
ERYTHROCYTE [DISTWIDTH] IN BLOOD BY AUTOMATED COUNT: 13.6 % (ref 10–15)
GLUCOSE SERPL-MCNC: 98 MG/DL (ref 70–99)
HCO3 SERPL-SCNC: 24 MMOL/L (ref 22–29)
HCT VFR BLD AUTO: 38.1 % (ref 35–47)
HGB BLD-MCNC: 12.2 G/DL (ref 11.7–15.7)
MCH RBC QN AUTO: 31 PG (ref 26.5–33)
MCHC RBC AUTO-ENTMCNC: 32 G/DL (ref 31.5–36.5)
MCV RBC AUTO: 97 FL (ref 78–100)
PLATELET # BLD AUTO: 357 10E3/UL (ref 150–450)
POTASSIUM SERPL-SCNC: 3.7 MMOL/L (ref 3.4–5.3)
RBC # BLD AUTO: 3.93 10E6/UL (ref 3.8–5.2)
SODIUM SERPL-SCNC: 145 MMOL/L (ref 135–145)
WBC # BLD AUTO: 11.9 10E3/UL (ref 4–11)

## 2024-10-16 PROCEDURE — 80048 BASIC METABOLIC PNL TOTAL CA: CPT | Mod: ORL | Performed by: PHYSICIAN ASSISTANT

## 2024-10-16 PROCEDURE — 85027 COMPLETE CBC AUTOMATED: CPT | Mod: ORL | Performed by: PHYSICIAN ASSISTANT

## (undated) DEVICE — CUSTOM PACK CORONARY SAN5BCRHEA

## (undated) DEVICE — CUSTOM PACK LUMBAR FUSION SNE5BLFHEA

## (undated) DEVICE — INTRO MICRO MINI STICK 4FR STIFF NITINOL 45-753

## (undated) DEVICE — SOL NACL 0.9% IRRIG 1000ML BOTTLE 2F7124

## (undated) DEVICE — DRAPE C-ARM 60X42" 1013

## (undated) DEVICE — RX SURGIFLO HEMOSTATIC MATRIX 8ML 2991

## (undated) DEVICE — GLOVE SURG PI ULTRA TOUCH M SZ 7 LF 42670

## (undated) DEVICE — SYR ANGIOGRAPHY MULTIUSE KIT ACIST 014612

## (undated) DEVICE — SOL WATER IRRIG 1000ML BOTTLE 2F7114

## (undated) DEVICE — CATH PULM ART 7FR X 110CM, SWA

## (undated) DEVICE — GLOVE BIOGEL PI ORTHOPRO SZ 8.0 47680

## (undated) DEVICE — DRSG STERI STRIP 1/2X4" R1547

## (undated) DEVICE — CUSHION INSERT LG PRONE VIEW JACKSON TABLE

## (undated) DEVICE — POSITIONER ARM CRADLE LAMINECTOMY DISP

## (undated) DEVICE — DRSG GAUZE 4X4" 8044

## (undated) DEVICE — SUTURE VICRYL+ 3-0 18IN PS-2 UND VCP497H

## (undated) DEVICE — SHEATH GUIDE SLENDER 7FRX10CM SS 80-1070

## (undated) DEVICE — ELECTRODE DEFIB CADENCE 22550R

## (undated) DEVICE — DRSG ADAPTIC 3X8" 6113

## (undated) DEVICE — DRSG DRAIN 4X4" 7086

## (undated) DEVICE — TOOL DISSECT MIDAS MR8 14CM MATCH HEAD 3MM MR8-14MH30

## (undated) DEVICE — SUTURE VICRYL+ 0 27IN CT-1 UND VCP260H

## (undated) DEVICE — KIT HAND CONTROL ACIST 014644 AR-P54

## (undated) DEVICE — DECANTER VIAL 2006S

## (undated) DEVICE — ESU PENCIL SMOKE EVAC W/ROCKER SWITCH 0703-047-000

## (undated) DEVICE — MANIFOLD KIT ANGIO AUTOMATED 014613

## (undated) DEVICE — SYSTEM PANNUS RETENTION 4 PAD 2 STRAP CZ-PRS-04

## (undated) DEVICE — SUCTION MANIFOLD NEPTUNE 2 SYS 4 PORT 0702-020-000

## (undated) DEVICE — GUIDEWIRE VASC 0.014INX180CM RUNTHROUGH 25-1011

## (undated) DEVICE — SYR 03ML LL W/O NDL 309657

## (undated) DEVICE — PLATE GROUNDING ADULT W/CORD 9165L

## (undated) DEVICE — SUTURE VICRYL+ 2-0 CT-2 27" UND VCP269H

## (undated) DEVICE — CATH IV ANGIO INTRO 14GA X 1 3/4" 381467

## (undated) DEVICE — WRAP LUMBAR COMPRESS COLD THERAPY 4632P

## (undated) DEVICE — GLOVE BIOGEL PI INDICATOR 8.0 LF 41680

## (undated) DEVICE — GLOVE SURG PI ULTRA TOUCH M SZ 8 LF

## (undated) DEVICE — GOWN IMPERVIOUS BREATHABLE SMART XLG 89045

## (undated) DEVICE — KIT DRAIN CLOSED WOUND SUCTION MED 400ML RESVR

## (undated) DEVICE — Device

## (undated) DEVICE — GUIDEWIRE STRT .025 SCN M001491001

## (undated) DEVICE — PACK COLD 6 X 10 1-HOUR 0814-0610

## (undated) DEVICE — DRSG GAUZE 4X4" 3033

## (undated) DEVICE — GLOVE UNDER INDICATOR PI SZ 7.0 LF 41670

## (undated) RX ORDER — FENTANYL CITRATE 50 UG/ML
INJECTION, SOLUTION INTRAMUSCULAR; INTRAVENOUS
Status: DISPENSED
Start: 2022-11-08

## (undated) RX ORDER — ONDANSETRON 2 MG/ML
INJECTION INTRAMUSCULAR; INTRAVENOUS
Status: DISPENSED
Start: 2022-11-08

## (undated) RX ORDER — DEXMEDETOMIDINE HYDROCHLORIDE 4 UG/ML
INJECTION, SOLUTION INTRAVENOUS
Status: DISPENSED
Start: 2022-11-08

## (undated) RX ORDER — DEXAMETHASONE SODIUM PHOSPHATE 4 MG/ML
INJECTION, SOLUTION INTRA-ARTICULAR; INTRALESIONAL; INTRAMUSCULAR; INTRAVENOUS; SOFT TISSUE
Status: DISPENSED
Start: 2022-11-08

## (undated) RX ORDER — LIDOCAINE HYDROCHLORIDE 10 MG/ML
INJECTION, SOLUTION EPIDURAL; INFILTRATION; INTRACAUDAL; PERINEURAL
Status: DISPENSED
Start: 2022-11-08

## (undated) RX ORDER — FENTANYL CITRATE 50 UG/ML
INJECTION, SOLUTION INTRAMUSCULAR; INTRAVENOUS
Status: DISPENSED
Start: 2024-09-25

## (undated) RX ORDER — FENTANYL CITRATE-0.9 % NACL/PF 10 MCG/ML
PLASTIC BAG, INJECTION (ML) INTRAVENOUS
Status: DISPENSED
Start: 2022-11-08

## (undated) RX ORDER — ASPIRIN 325 MG
TABLET ORAL
Status: DISPENSED
Start: 2024-09-25

## (undated) RX ORDER — PROPOFOL 10 MG/ML
INJECTION, EMULSION INTRAVENOUS
Status: DISPENSED
Start: 2022-11-08